# Patient Record
Sex: FEMALE | Race: WHITE | Employment: OTHER | URBAN - NONMETROPOLITAN AREA
[De-identification: names, ages, dates, MRNs, and addresses within clinical notes are randomized per-mention and may not be internally consistent; named-entity substitution may affect disease eponyms.]

---

## 2017-07-19 ENCOUNTER — OFFICE VISIT (OUTPATIENT)
Dept: SURGERY | Age: 67
End: 2017-07-19
Payer: MEDICARE

## 2017-07-19 VITALS
WEIGHT: 188.8 LBS | SYSTOLIC BLOOD PRESSURE: 118 MMHG | HEIGHT: 69 IN | RESPIRATION RATE: 16 BRPM | OXYGEN SATURATION: 97 % | HEART RATE: 68 BPM | TEMPERATURE: 99.1 F | BODY MASS INDEX: 27.96 KG/M2 | DIASTOLIC BLOOD PRESSURE: 64 MMHG

## 2017-07-19 DIAGNOSIS — K80.50 BILIARY COLIC: ICD-10-CM

## 2017-07-19 DIAGNOSIS — R10.9 ABDOMINAL PAIN, UNSPECIFIED LOCATION: Primary | ICD-10-CM

## 2017-07-19 PROCEDURE — 3014F SCREEN MAMMO DOC REV: CPT | Performed by: SURGERY

## 2017-07-19 PROCEDURE — 99203 OFFICE O/P NEW LOW 30 MIN: CPT | Performed by: SURGERY

## 2017-07-19 PROCEDURE — 1090F PRES/ABSN URINE INCON ASSESS: CPT | Performed by: SURGERY

## 2017-07-19 PROCEDURE — 3017F COLORECTAL CA SCREEN DOC REV: CPT | Performed by: SURGERY

## 2017-07-19 PROCEDURE — 1036F TOBACCO NON-USER: CPT | Performed by: SURGERY

## 2017-07-19 PROCEDURE — 1123F ACP DISCUSS/DSCN MKR DOCD: CPT | Performed by: SURGERY

## 2017-07-19 PROCEDURE — G8419 CALC BMI OUT NRM PARAM NOF/U: HCPCS | Performed by: SURGERY

## 2017-07-19 PROCEDURE — G8400 PT W/DXA NO RESULTS DOC: HCPCS | Performed by: SURGERY

## 2017-07-19 PROCEDURE — G8427 DOCREV CUR MEDS BY ELIG CLIN: HCPCS | Performed by: SURGERY

## 2017-07-19 PROCEDURE — 4040F PNEUMOC VAC/ADMIN/RCVD: CPT | Performed by: SURGERY

## 2017-07-19 RX ORDER — IBUPROFEN 800 MG/1
800 TABLET ORAL EVERY 8 HOURS PRN
COMMUNITY
End: 2017-08-18 | Stop reason: ALTCHOICE

## 2017-07-19 RX ORDER — ZOLEDRONIC ACID 5 MG/100ML
5 INJECTION, SOLUTION INTRAVENOUS ONCE
COMMUNITY
End: 2021-04-28 | Stop reason: ALTCHOICE

## 2017-07-19 ASSESSMENT — ENCOUNTER SYMPTOMS
SORE THROAT: 0
SINUS PRESSURE: 0
ABDOMINAL DISTENTION: 0
ABDOMINAL PAIN: 1
COUGH: 0
EYE DISCHARGE: 0
COLOR CHANGE: 0
TROUBLE SWALLOWING: 0
DIARRHEA: 0
EYE PAIN: 0
APNEA: 0
CHOKING: 0
EYE REDNESS: 0
VOMITING: 0
BACK PAIN: 1
CHEST TIGHTNESS: 0
BLOOD IN STOOL: 0
VOICE CHANGE: 0
PHOTOPHOBIA: 0
FACIAL SWELLING: 0
SHORTNESS OF BREATH: 0
EYE ITCHING: 0
ANAL BLEEDING: 0
NAUSEA: 1
WHEEZING: 0
RECTAL PAIN: 0
STRIDOR: 0
RHINORRHEA: 0
CONSTIPATION: 0

## 2017-07-20 ENCOUNTER — TELEPHONE (OUTPATIENT)
Dept: SURGERY | Age: 67
End: 2017-07-20

## 2017-07-24 ENCOUNTER — ANESTHESIA (OUTPATIENT)
Dept: OPERATING ROOM | Age: 67
DRG: 418 | End: 2017-07-24
Payer: MEDICARE

## 2017-07-24 ENCOUNTER — ANESTHESIA EVENT (OUTPATIENT)
Dept: OPERATING ROOM | Age: 67
DRG: 418 | End: 2017-07-24
Payer: MEDICARE

## 2017-07-24 ENCOUNTER — HOSPITAL ENCOUNTER (INPATIENT)
Age: 67
LOS: 6 days | Discharge: ANOTHER HOSPITAL | DRG: 418 | End: 2017-08-01
Attending: SURGERY | Admitting: SURGERY
Payer: MEDICARE

## 2017-07-24 VITALS
SYSTOLIC BLOOD PRESSURE: 132 MMHG | TEMPERATURE: 96.8 F | OXYGEN SATURATION: 81 % | DIASTOLIC BLOOD PRESSURE: 61 MMHG | RESPIRATION RATE: 2 BRPM

## 2017-07-24 DIAGNOSIS — I47.1 NARROW COMPLEX TACHYCARDIA (HCC): Primary | ICD-10-CM

## 2017-07-24 PROCEDURE — 6360000002 HC RX W HCPCS: Performed by: SURGERY

## 2017-07-24 PROCEDURE — 7100000000 HC PACU RECOVERY - FIRST 15 MIN: Performed by: SURGERY

## 2017-07-24 PROCEDURE — G0378 HOSPITAL OBSERVATION PER HR: HCPCS

## 2017-07-24 PROCEDURE — 6370000000 HC RX 637 (ALT 250 FOR IP): Performed by: SURGERY

## 2017-07-24 PROCEDURE — 88304 TISSUE EXAM BY PATHOLOGIST: CPT

## 2017-07-24 PROCEDURE — 3700000000 HC ANESTHESIA ATTENDED CARE: Performed by: SURGERY

## 2017-07-24 PROCEDURE — 0FT44ZZ RESECTION OF GALLBLADDER, PERCUTANEOUS ENDOSCOPIC APPROACH: ICD-10-PCS | Performed by: SURGERY

## 2017-07-24 PROCEDURE — 2500000003 HC RX 250 WO HCPCS: Performed by: NURSE ANESTHETIST, CERTIFIED REGISTERED

## 2017-07-24 PROCEDURE — 2580000003 HC RX 258: Performed by: SURGERY

## 2017-07-24 PROCEDURE — 3600000013 HC SURGERY LEVEL 3 ADDTL 15MIN: Performed by: SURGERY

## 2017-07-24 PROCEDURE — 6360000002 HC RX W HCPCS: Performed by: NURSE ANESTHETIST, CERTIFIED REGISTERED

## 2017-07-24 PROCEDURE — 6360000002 HC RX W HCPCS: Performed by: ANESTHESIOLOGY

## 2017-07-24 PROCEDURE — 2580000003 HC RX 258: Performed by: NURSE ANESTHETIST, CERTIFIED REGISTERED

## 2017-07-24 PROCEDURE — 0WQF4ZZ REPAIR ABDOMINAL WALL, PERCUTANEOUS ENDOSCOPIC APPROACH: ICD-10-PCS | Performed by: SURGERY

## 2017-07-24 PROCEDURE — 3700000001 HC ADD 15 MINUTES (ANESTHESIA): Performed by: SURGERY

## 2017-07-24 PROCEDURE — 2500000003 HC RX 250 WO HCPCS: Performed by: SURGERY

## 2017-07-24 PROCEDURE — 7100000001 HC PACU RECOVERY - ADDTL 15 MIN: Performed by: SURGERY

## 2017-07-24 PROCEDURE — 3600000003 HC SURGERY LEVEL 3 BASE: Performed by: SURGERY

## 2017-07-24 PROCEDURE — 49585 REPAIR UMBILICAL HERN,5+Y/O,REDUC: CPT | Performed by: SURGERY

## 2017-07-24 PROCEDURE — 47562 LAPAROSCOPIC CHOLECYSTECTOMY: CPT | Performed by: SURGERY

## 2017-07-24 RX ORDER — PROPOFOL 10 MG/ML
INJECTION, EMULSION INTRAVENOUS PRN
Status: DISCONTINUED | OUTPATIENT
Start: 2017-07-24 | End: 2017-07-24 | Stop reason: SDUPTHER

## 2017-07-24 RX ORDER — LABETALOL HYDROCHLORIDE 5 MG/ML
5 INJECTION, SOLUTION INTRAVENOUS EVERY 10 MIN PRN
Status: DISCONTINUED | OUTPATIENT
Start: 2017-07-24 | End: 2017-07-24 | Stop reason: HOSPADM

## 2017-07-24 RX ORDER — MEPERIDINE HYDROCHLORIDE 25 MG/ML
12.5 INJECTION INTRAMUSCULAR; INTRAVENOUS; SUBCUTANEOUS EVERY 5 MIN PRN
Status: DISCONTINUED | OUTPATIENT
Start: 2017-07-24 | End: 2017-07-24 | Stop reason: HOSPADM

## 2017-07-24 RX ORDER — ONDANSETRON 2 MG/ML
8 INJECTION INTRAMUSCULAR; INTRAVENOUS EVERY 4 HOURS PRN
Status: DISCONTINUED | OUTPATIENT
Start: 2017-07-24 | End: 2017-07-25

## 2017-07-24 RX ORDER — LIDOCAINE HYDROCHLORIDE 20 MG/ML
INJECTION, SOLUTION INFILTRATION; PERINEURAL PRN
Status: DISCONTINUED | OUTPATIENT
Start: 2017-07-24 | End: 2017-07-24 | Stop reason: SDUPTHER

## 2017-07-24 RX ORDER — OXYCODONE HYDROCHLORIDE AND ACETAMINOPHEN 5; 325 MG/1; MG/1
TABLET ORAL
Status: DISPENSED
Start: 2017-07-24 | End: 2017-07-25

## 2017-07-24 RX ORDER — ROCURONIUM BROMIDE 10 MG/ML
INJECTION, SOLUTION INTRAVENOUS PRN
Status: DISCONTINUED | OUTPATIENT
Start: 2017-07-24 | End: 2017-07-24 | Stop reason: SDUPTHER

## 2017-07-24 RX ORDER — SODIUM CHLORIDE, SODIUM LACTATE, POTASSIUM CHLORIDE, CALCIUM CHLORIDE 600; 310; 30; 20 MG/100ML; MG/100ML; MG/100ML; MG/100ML
INJECTION, SOLUTION INTRAVENOUS CONTINUOUS PRN
Status: DISCONTINUED | OUTPATIENT
Start: 2017-07-24 | End: 2017-07-24 | Stop reason: SDUPTHER

## 2017-07-24 RX ORDER — SODIUM CHLORIDE 9 MG/ML
INJECTION, SOLUTION INTRAVENOUS CONTINUOUS
Status: DISCONTINUED | OUTPATIENT
Start: 2017-07-24 | End: 2017-07-30

## 2017-07-24 RX ORDER — ONDANSETRON 2 MG/ML
4 INJECTION INTRAMUSCULAR; INTRAVENOUS EVERY 6 HOURS PRN
Status: DISCONTINUED | OUTPATIENT
Start: 2017-07-24 | End: 2017-07-25

## 2017-07-24 RX ORDER — FENTANYL CITRATE 50 UG/ML
50 INJECTION, SOLUTION INTRAMUSCULAR; INTRAVENOUS EVERY 5 MIN PRN
Status: DISCONTINUED | OUTPATIENT
Start: 2017-07-24 | End: 2017-07-24 | Stop reason: HOSPADM

## 2017-07-24 RX ORDER — MIDAZOLAM HYDROCHLORIDE 1 MG/ML
INJECTION INTRAMUSCULAR; INTRAVENOUS PRN
Status: DISCONTINUED | OUTPATIENT
Start: 2017-07-24 | End: 2017-07-24 | Stop reason: SDUPTHER

## 2017-07-24 RX ORDER — KETOROLAC TROMETHAMINE 30 MG/ML
INJECTION, SOLUTION INTRAMUSCULAR; INTRAVENOUS PRN
Status: DISCONTINUED | OUTPATIENT
Start: 2017-07-24 | End: 2017-07-24 | Stop reason: SDUPTHER

## 2017-07-24 RX ORDER — FENTANYL CITRATE 50 UG/ML
INJECTION, SOLUTION INTRAMUSCULAR; INTRAVENOUS PRN
Status: DISCONTINUED | OUTPATIENT
Start: 2017-07-24 | End: 2017-07-24 | Stop reason: SDUPTHER

## 2017-07-24 RX ORDER — ONDANSETRON 2 MG/ML
4 INJECTION INTRAMUSCULAR; INTRAVENOUS
Status: COMPLETED | OUTPATIENT
Start: 2017-07-24 | End: 2017-07-24

## 2017-07-24 RX ORDER — OXYCODONE HYDROCHLORIDE AND ACETAMINOPHEN 5; 325 MG/1; MG/1
2 TABLET ORAL EVERY 4 HOURS PRN
Status: DISCONTINUED | OUTPATIENT
Start: 2017-07-24 | End: 2017-08-01 | Stop reason: HOSPADM

## 2017-07-24 RX ORDER — ONDANSETRON 2 MG/ML
INJECTION INTRAMUSCULAR; INTRAVENOUS PRN
Status: DISCONTINUED | OUTPATIENT
Start: 2017-07-24 | End: 2017-07-24 | Stop reason: SDUPTHER

## 2017-07-24 RX ORDER — NEOSTIGMINE METHYLSULFATE 1 MG/ML
INJECTION, SOLUTION INTRAVENOUS PRN
Status: DISCONTINUED | OUTPATIENT
Start: 2017-07-24 | End: 2017-07-24 | Stop reason: SDUPTHER

## 2017-07-24 RX ORDER — OXYCODONE HYDROCHLORIDE AND ACETAMINOPHEN 5; 325 MG/1; MG/1
1 TABLET ORAL EVERY 4 HOURS PRN
Status: DISCONTINUED | OUTPATIENT
Start: 2017-07-24 | End: 2017-08-01 | Stop reason: HOSPADM

## 2017-07-24 RX ORDER — BUPIVACAINE HYDROCHLORIDE AND EPINEPHRINE 5; 5 MG/ML; UG/ML
INJECTION, SOLUTION EPIDURAL; INTRACAUDAL; PERINEURAL PRN
Status: DISCONTINUED | OUTPATIENT
Start: 2017-07-24 | End: 2017-07-24 | Stop reason: HOSPADM

## 2017-07-24 RX ORDER — GLYCOPYRROLATE 0.2 MG/ML
INJECTION INTRAMUSCULAR; INTRAVENOUS PRN
Status: DISCONTINUED | OUTPATIENT
Start: 2017-07-24 | End: 2017-07-24 | Stop reason: SDUPTHER

## 2017-07-24 RX ORDER — OXYCODONE HYDROCHLORIDE AND ACETAMINOPHEN 5; 325 MG/1; MG/1
1 TABLET ORAL EVERY 6 HOURS PRN
Qty: 24 TABLET | Refills: 0 | Status: ON HOLD | OUTPATIENT
Start: 2017-07-24 | End: 2017-09-26 | Stop reason: ALTCHOICE

## 2017-07-24 RX ADMIN — ONDANSETRON 4 MG: 2 INJECTION INTRAMUSCULAR; INTRAVENOUS at 17:24

## 2017-07-24 RX ADMIN — GLYCOPYRROLATE 0.6 MG: 0.2 INJECTION, SOLUTION INTRAMUSCULAR; INTRAVENOUS at 13:20

## 2017-07-24 RX ADMIN — LIDOCAINE HYDROCHLORIDE 80 MG: 20 INJECTION, SOLUTION INFILTRATION; PERINEURAL at 12:20

## 2017-07-24 RX ADMIN — FENTANYL CITRATE 50 MCG: 50 INJECTION INTRAMUSCULAR; INTRAVENOUS at 12:50

## 2017-07-24 RX ADMIN — SODIUM CHLORIDE: 9 INJECTION, SOLUTION INTRAVENOUS at 17:32

## 2017-07-24 RX ADMIN — SODIUM CHLORIDE: 9 INJECTION, SOLUTION INTRAVENOUS at 10:38

## 2017-07-24 RX ADMIN — OXYCODONE HYDROCHLORIDE AND ACETAMINOPHEN 1 TABLET: 5; 325 TABLET ORAL at 15:08

## 2017-07-24 RX ADMIN — KETOROLAC TROMETHAMINE 30 MG: 30 INJECTION, SOLUTION INTRAMUSCULAR at 13:12

## 2017-07-24 RX ADMIN — ONDANSETRON 4 MG: 2 INJECTION INTRAMUSCULAR; INTRAVENOUS at 13:12

## 2017-07-24 RX ADMIN — Medication 35 MG: at 12:21

## 2017-07-24 RX ADMIN — MIDAZOLAM HYDROCHLORIDE 2 MG: 1 INJECTION INTRAMUSCULAR; INTRAVENOUS at 12:12

## 2017-07-24 RX ADMIN — NEOSTIGMINE METHYLSULFATE 3 MG: 1 INJECTION, SOLUTION INTRAVENOUS at 13:20

## 2017-07-24 RX ADMIN — PROPOFOL 200 MG: 10 INJECTION, EMULSION INTRAVENOUS at 12:20

## 2017-07-24 RX ADMIN — FENTANYL CITRATE 100 MCG: 50 INJECTION INTRAMUSCULAR; INTRAVENOUS at 12:20

## 2017-07-24 RX ADMIN — OXYCODONE HYDROCHLORIDE AND ACETAMINOPHEN 1 TABLET: 5; 325 TABLET ORAL at 23:58

## 2017-07-24 RX ADMIN — HYDROMORPHONE HYDROCHLORIDE 0.5 MG: 1 INJECTION, SOLUTION INTRAMUSCULAR; INTRAVENOUS; SUBCUTANEOUS at 16:12

## 2017-07-24 RX ADMIN — SODIUM CHLORIDE, POTASSIUM CHLORIDE, SODIUM LACTATE AND CALCIUM CHLORIDE: 600; 310; 30; 20 INJECTION, SOLUTION INTRAVENOUS at 13:23

## 2017-07-24 RX ADMIN — FENTANYL CITRATE 100 MCG: 50 INJECTION INTRAMUSCULAR; INTRAVENOUS at 13:31

## 2017-07-24 ASSESSMENT — PULMONARY FUNCTION TESTS
PIF_VALUE: 27
PIF_VALUE: 15
PIF_VALUE: 27
PIF_VALUE: 15
PIF_VALUE: 2
PIF_VALUE: 27
PIF_VALUE: 0
PIF_VALUE: 27
PIF_VALUE: 27
PIF_VALUE: 8
PIF_VALUE: 15
PIF_VALUE: 27
PIF_VALUE: 1
PIF_VALUE: 15
PIF_VALUE: 27
PIF_VALUE: 15
PIF_VALUE: 1
PIF_VALUE: 20
PIF_VALUE: 15
PIF_VALUE: 27
PIF_VALUE: 16
PIF_VALUE: 16
PIF_VALUE: 14
PIF_VALUE: 27
PIF_VALUE: 15
PIF_VALUE: 0
PIF_VALUE: 15
PIF_VALUE: 15
PIF_VALUE: 0
PIF_VALUE: 0
PIF_VALUE: 15
PIF_VALUE: 15
PIF_VALUE: 13
PIF_VALUE: 2
PIF_VALUE: 27
PIF_VALUE: 27
PIF_VALUE: 0
PIF_VALUE: 27
PIF_VALUE: 15
PIF_VALUE: 27
PIF_VALUE: 19
PIF_VALUE: 15
PIF_VALUE: 15
PIF_VALUE: 0
PIF_VALUE: 15
PIF_VALUE: 0
PIF_VALUE: 1
PIF_VALUE: 27
PIF_VALUE: 14
PIF_VALUE: 1
PIF_VALUE: 14
PIF_VALUE: 14
PIF_VALUE: 15
PIF_VALUE: 15
PIF_VALUE: 0
PIF_VALUE: 15
PIF_VALUE: 27
PIF_VALUE: 2
PIF_VALUE: 27
PIF_VALUE: 0
PIF_VALUE: 15
PIF_VALUE: 8
PIF_VALUE: 15
PIF_VALUE: 15
PIF_VALUE: 14
PIF_VALUE: 0
PIF_VALUE: 15
PIF_VALUE: 27
PIF_VALUE: 0
PIF_VALUE: 27
PIF_VALUE: 27
PIF_VALUE: 16
PIF_VALUE: 15
PIF_VALUE: 15
PIF_VALUE: 16
PIF_VALUE: 27
PIF_VALUE: 13
PIF_VALUE: 15
PIF_VALUE: 15
PIF_VALUE: 14

## 2017-07-24 ASSESSMENT — PAIN DESCRIPTION - PAIN TYPE
TYPE: SURGICAL PAIN
TYPE: SURGICAL PAIN

## 2017-07-24 ASSESSMENT — PAIN SCALES - GENERAL
PAINLEVEL_OUTOF10: 7
PAINLEVEL_OUTOF10: 3
PAINLEVEL_OUTOF10: 8
PAINLEVEL_OUTOF10: 0
PAINLEVEL_OUTOF10: 3
PAINLEVEL_OUTOF10: 6
PAINLEVEL_OUTOF10: 8
PAINLEVEL_OUTOF10: 7
PAINLEVEL_OUTOF10: 0

## 2017-07-24 ASSESSMENT — PAIN DESCRIPTION - LOCATION: LOCATION: ABDOMEN

## 2017-07-24 ASSESSMENT — PAIN - FUNCTIONAL ASSESSMENT: PAIN_FUNCTIONAL_ASSESSMENT: 0-10

## 2017-07-25 ENCOUNTER — APPOINTMENT (OUTPATIENT)
Dept: GENERAL RADIOLOGY | Age: 67
DRG: 418 | End: 2017-07-25
Attending: SURGERY
Payer: MEDICARE

## 2017-07-25 ENCOUNTER — APPOINTMENT (OUTPATIENT)
Dept: CT IMAGING | Age: 67
DRG: 418 | End: 2017-07-25
Attending: SURGERY
Payer: MEDICARE

## 2017-07-25 PROBLEM — I47.19 NARROW COMPLEX TACHYCARDIA: Status: ACTIVE | Noted: 2017-07-25

## 2017-07-25 PROBLEM — I71.21 ASCENDING AORTIC ANEURYSM (HCC): Status: ACTIVE | Noted: 2017-07-25

## 2017-07-25 PROBLEM — Z90.49 S/P LAPAROSCOPIC CHOLECYSTECTOMY: Status: ACTIVE | Noted: 2017-07-25

## 2017-07-25 PROBLEM — I47.1 NARROW COMPLEX TACHYCARDIA: Status: ACTIVE | Noted: 2017-07-25

## 2017-07-25 LAB
ALBUMIN SERPL-MCNC: 3.4 G/DL (ref 3.5–5.1)
ALBUMIN SERPL-MCNC: 3.4 G/DL (ref 3.5–5.1)
ALLEN TEST: ABNORMAL
ALP BLD-CCNC: 55 U/L (ref 38–126)
ALP BLD-CCNC: 57 U/L (ref 38–126)
ALT SERPL-CCNC: 30 U/L (ref 11–66)
ALT SERPL-CCNC: 33 U/L (ref 11–66)
ANION GAP SERPL CALCULATED.3IONS-SCNC: 11 MEQ/L (ref 8–16)
ANION GAP SERPL CALCULATED.3IONS-SCNC: 16 MEQ/L (ref 8–16)
AST SERPL-CCNC: 44 U/L (ref 5–40)
AST SERPL-CCNC: 49 U/L (ref 5–40)
BASE EXCESS (CALCULATED): -2.2 MMOL/L (ref -2.5–2.5)
BILIRUB SERPL-MCNC: 1.3 MG/DL (ref 0.3–1.2)
BILIRUB SERPL-MCNC: 2.3 MG/DL (ref 0.3–1.2)
BILIRUBIN DIRECT: 0.5 MG/DL (ref 0–0.3)
BUN BLDV-MCNC: 13 MG/DL (ref 7–22)
BUN BLDV-MCNC: 17 MG/DL (ref 7–22)
CALCIUM IONIZED: 0.99 MMOL/L (ref 1.12–1.32)
CALCIUM SERPL-MCNC: 7.8 MG/DL (ref 8.5–10.5)
CALCIUM SERPL-MCNC: 7.9 MG/DL (ref 8.5–10.5)
CHLORIDE BLD-SCNC: 104 MEQ/L (ref 98–111)
CHLORIDE BLD-SCNC: 104 MEQ/L (ref 98–111)
CO2: 18 MEQ/L (ref 23–33)
CO2: 24 MEQ/L (ref 23–33)
COLLECTED BY:: ABNORMAL
CREAT SERPL-MCNC: 0.7 MG/DL (ref 0.4–1.2)
CREAT SERPL-MCNC: 0.7 MG/DL (ref 0.4–1.2)
D-DIMER QUANTITATIVE: 4888 NG/ML FEU (ref 0–500)
DEVICE: ABNORMAL
GFR SERPL CREATININE-BSD FRML MDRD: 83 ML/MIN/1.73M2
GFR SERPL CREATININE-BSD FRML MDRD: 83 ML/MIN/1.73M2
GLUCOSE BLD-MCNC: 105 MG/DL (ref 70–108)
GLUCOSE BLD-MCNC: 130 MG/DL (ref 70–108)
HCO3: 22 MMOL/L (ref 23–28)
HCT VFR BLD CALC: 38 % (ref 37–47)
HCT VFR BLD CALC: 38.6 % (ref 37–47)
HEMOGLOBIN: 12.9 GM/DL (ref 12–16)
HEMOGLOBIN: 13 GM/DL (ref 12–16)
IFIO2: 2
LV EF: 63 %
LVEF MODALITY: NORMAL
MAGNESIUM: 1.7 MG/DL (ref 1.6–2.4)
MAGNESIUM: 1.7 MG/DL (ref 1.6–2.4)
MCH RBC QN AUTO: 33 PG (ref 27–31)
MCH RBC QN AUTO: 33.3 PG (ref 27–31)
MCHC RBC AUTO-ENTMCNC: 33.7 GM/DL (ref 33–37)
MCHC RBC AUTO-ENTMCNC: 33.8 GM/DL (ref 33–37)
MCV RBC AUTO: 97.9 FL (ref 81–99)
MCV RBC AUTO: 98.4 FL (ref 81–99)
O2 SATURATION: 95 %
PCO2: 35 MMHG (ref 35–45)
PDW BLD-RTO: 13.2 % (ref 11.5–14.5)
PDW BLD-RTO: 13.4 % (ref 11.5–14.5)
PH BLOOD GAS: 7.41 (ref 7.35–7.45)
PHOSPHORUS: 2.3 MG/DL (ref 2.4–4.7)
PHOSPHORUS: 2.9 MG/DL (ref 2.4–4.7)
PLATELET # BLD: 125 THOU/MM3 (ref 130–400)
PLATELET # BLD: 135 THOU/MM3 (ref 130–400)
PMV BLD AUTO: 8.3 MCM (ref 7.4–10.4)
PMV BLD AUTO: 8.5 MCM (ref 7.4–10.4)
PO2: 72 MMHG (ref 71–104)
POTASSIUM SERPL-SCNC: 3.5 MEQ/L (ref 3.5–5.2)
POTASSIUM SERPL-SCNC: 4.2 MEQ/L (ref 3.5–5.2)
RBC # BLD: 3.86 MILL/MM3 (ref 4.2–5.4)
RBC # BLD: 3.95 MILL/MM3 (ref 4.2–5.4)
SODIUM BLD-SCNC: 138 MEQ/L (ref 135–145)
SODIUM BLD-SCNC: 139 MEQ/L (ref 135–145)
SOURCE, BLOOD GAS: ABNORMAL
TOTAL PROTEIN: 6 G/DL (ref 6.1–8)
TOTAL PROTEIN: 6.1 G/DL (ref 6.1–8)
TROPONIN T: < 0.01 NG/ML
TSH SERPL DL<=0.05 MIU/L-ACNC: 3.9 UIU/ML (ref 0.4–4.2)
WBC # BLD: 8.1 THOU/MM3 (ref 4.8–10.8)
WBC # BLD: 8.8 THOU/MM3 (ref 4.8–10.8)

## 2017-07-25 PROCEDURE — 99024 POSTOP FOLLOW-UP VISIT: CPT | Performed by: NURSE PRACTITIONER

## 2017-07-25 PROCEDURE — 2500000003 HC RX 250 WO HCPCS

## 2017-07-25 PROCEDURE — 83735 ASSAY OF MAGNESIUM: CPT

## 2017-07-25 PROCEDURE — 82803 BLOOD GASES ANY COMBINATION: CPT

## 2017-07-25 PROCEDURE — G0378 HOSPITAL OBSERVATION PER HR: HCPCS

## 2017-07-25 PROCEDURE — 93306 TTE W/DOPPLER COMPLETE: CPT

## 2017-07-25 PROCEDURE — 84484 ASSAY OF TROPONIN QUANT: CPT

## 2017-07-25 PROCEDURE — 6370000000 HC RX 637 (ALT 250 FOR IP): Performed by: SURGERY

## 2017-07-25 PROCEDURE — 2580000003 HC RX 258: Performed by: SURGERY

## 2017-07-25 PROCEDURE — 93005 ELECTROCARDIOGRAM TRACING: CPT

## 2017-07-25 PROCEDURE — 84100 ASSAY OF PHOSPHORUS: CPT

## 2017-07-25 PROCEDURE — 6360000004 HC RX CONTRAST MEDICATION: Performed by: INTERNAL MEDICINE

## 2017-07-25 PROCEDURE — 36600 WITHDRAWAL OF ARTERIAL BLOOD: CPT

## 2017-07-25 PROCEDURE — 71010 XR CHEST PORTABLE: CPT

## 2017-07-25 PROCEDURE — 6360000002 HC RX W HCPCS: Performed by: INTERNAL MEDICINE

## 2017-07-25 PROCEDURE — 71260 CT THORAX DX C+: CPT

## 2017-07-25 PROCEDURE — 36415 COLL VENOUS BLD VENIPUNCTURE: CPT

## 2017-07-25 PROCEDURE — 85379 FIBRIN DEGRADATION QUANT: CPT

## 2017-07-25 PROCEDURE — 99222 1ST HOSP IP/OBS MODERATE 55: CPT | Performed by: INTERNAL MEDICINE

## 2017-07-25 PROCEDURE — 6370000000 HC RX 637 (ALT 250 FOR IP): Performed by: INTERNAL MEDICINE

## 2017-07-25 PROCEDURE — 6360000002 HC RX W HCPCS: Performed by: SURGERY

## 2017-07-25 PROCEDURE — 6370000000 HC RX 637 (ALT 250 FOR IP): Performed by: NURSE PRACTITIONER

## 2017-07-25 PROCEDURE — 80048 BASIC METABOLIC PNL TOTAL CA: CPT

## 2017-07-25 PROCEDURE — 2580000003 HC RX 258: Performed by: INTERNAL MEDICINE

## 2017-07-25 PROCEDURE — 80076 HEPATIC FUNCTION PANEL: CPT

## 2017-07-25 PROCEDURE — 80053 COMPREHEN METABOLIC PANEL: CPT

## 2017-07-25 PROCEDURE — 84443 ASSAY THYROID STIM HORMONE: CPT

## 2017-07-25 PROCEDURE — 99223 1ST HOSP IP/OBS HIGH 75: CPT | Performed by: INTERNAL MEDICINE

## 2017-07-25 PROCEDURE — 85027 COMPLETE CBC AUTOMATED: CPT

## 2017-07-25 PROCEDURE — 82330 ASSAY OF CALCIUM: CPT

## 2017-07-25 RX ORDER — POTASSIUM CHLORIDE 20 MEQ/1
40 TABLET, EXTENDED RELEASE ORAL PRN
Status: DISCONTINUED | OUTPATIENT
Start: 2017-07-25 | End: 2017-08-01 | Stop reason: HOSPADM

## 2017-07-25 RX ORDER — POTASSIUM CHLORIDE 20 MEQ/1
40 TABLET, EXTENDED RELEASE ORAL ONCE
Status: COMPLETED | OUTPATIENT
Start: 2017-07-25 | End: 2017-07-25

## 2017-07-25 RX ORDER — DIAZEPAM 5 MG/1
5 TABLET ORAL EVERY 6 HOURS PRN
Status: DISCONTINUED | OUTPATIENT
Start: 2017-07-25 | End: 2017-08-01 | Stop reason: HOSPADM

## 2017-07-25 RX ORDER — MORPHINE SULFATE 2 MG/ML
INJECTION, SOLUTION INTRAMUSCULAR; INTRAVENOUS
Status: DISPENSED
Start: 2017-07-25 | End: 2017-07-25

## 2017-07-25 RX ORDER — DOCUSATE SODIUM 100 MG/1
100 CAPSULE, LIQUID FILLED ORAL DAILY
Status: DISCONTINUED | OUTPATIENT
Start: 2017-07-25 | End: 2017-08-01 | Stop reason: HOSPADM

## 2017-07-25 RX ORDER — POTASSIUM CHLORIDE 7.45 MG/ML
10 INJECTION INTRAVENOUS PRN
Status: DISCONTINUED | OUTPATIENT
Start: 2017-07-25 | End: 2017-08-01 | Stop reason: HOSPADM

## 2017-07-25 RX ORDER — POTASSIUM CHLORIDE 20MEQ/15ML
40 LIQUID (ML) ORAL PRN
Status: DISCONTINUED | OUTPATIENT
Start: 2017-07-25 | End: 2017-08-01 | Stop reason: HOSPADM

## 2017-07-25 RX ORDER — SIMETHICONE 80 MG
80 TABLET,CHEWABLE ORAL EVERY 6 HOURS PRN
Status: DISCONTINUED | OUTPATIENT
Start: 2017-07-25 | End: 2017-08-01 | Stop reason: HOSPADM

## 2017-07-25 RX ORDER — ONDANSETRON 2 MG/ML
4 INJECTION INTRAMUSCULAR; INTRAVENOUS EVERY 6 HOURS PRN
Status: DISCONTINUED | OUTPATIENT
Start: 2017-07-25 | End: 2017-08-01 | Stop reason: HOSPADM

## 2017-07-25 RX ORDER — ONDANSETRON 2 MG/ML
8 INJECTION INTRAMUSCULAR; INTRAVENOUS EVERY 6 HOURS PRN
Status: DISCONTINUED | OUTPATIENT
Start: 2017-07-25 | End: 2017-08-01 | Stop reason: HOSPADM

## 2017-07-25 RX ADMIN — OXYCODONE HYDROCHLORIDE AND ACETAMINOPHEN 2 TABLET: 5; 325 TABLET ORAL at 19:51

## 2017-07-25 RX ADMIN — SODIUM CHLORIDE: 9 INJECTION, SOLUTION INTRAVENOUS at 08:37

## 2017-07-25 RX ADMIN — DOCUSATE SODIUM 100 MG: 100 CAPSULE ORAL at 17:26

## 2017-07-25 RX ADMIN — POTASSIUM CHLORIDE 40 MEQ: 1500 TABLET, EXTENDED RELEASE ORAL at 08:33

## 2017-07-25 RX ADMIN — SODIUM CHLORIDE: 9 INJECTION, SOLUTION INTRAVENOUS at 18:39

## 2017-07-25 RX ADMIN — OXYCODONE HYDROCHLORIDE AND ACETAMINOPHEN 2 TABLET: 5; 325 TABLET ORAL at 23:53

## 2017-07-25 RX ADMIN — CALCIUM GLUCONATE 1.5 G: 94 INJECTION, SOLUTION INTRAVENOUS at 08:33

## 2017-07-25 RX ADMIN — MAGNESIUM SULFATE HEPTAHYDRATE 2 G: 500 INJECTION, SOLUTION INTRAMUSCULAR; INTRAVENOUS at 19:16

## 2017-07-25 RX ADMIN — SIMETHICONE 80 MG: 80 TABLET, CHEWABLE ORAL at 14:23

## 2017-07-25 RX ADMIN — METOPROLOL TARTRATE 25 MG: 25 TABLET ORAL at 17:00

## 2017-07-25 RX ADMIN — OXYCODONE HYDROCHLORIDE AND ACETAMINOPHEN 1 TABLET: 5; 325 TABLET ORAL at 13:03

## 2017-07-25 RX ADMIN — OXYCODONE HYDROCHLORIDE AND ACETAMINOPHEN 2 TABLET: 5; 325 TABLET ORAL at 04:55

## 2017-07-25 RX ADMIN — IOPAMIDOL 80 ML: 755 INJECTION, SOLUTION INTRAVENOUS at 20:58

## 2017-07-25 RX ADMIN — SODIUM CHLORIDE: 9 INJECTION, SOLUTION INTRAVENOUS at 00:02

## 2017-07-25 ASSESSMENT — PAIN DESCRIPTION - DESCRIPTORS
DESCRIPTORS: SHARP

## 2017-07-25 ASSESSMENT — PAIN DESCRIPTION - LOCATION
LOCATION: ABDOMEN

## 2017-07-25 ASSESSMENT — PAIN DESCRIPTION - PAIN TYPE
TYPE: SURGICAL PAIN

## 2017-07-25 ASSESSMENT — PAIN SCALES - GENERAL
PAINLEVEL_OUTOF10: 4
PAINLEVEL_OUTOF10: 8
PAINLEVEL_OUTOF10: 6
PAINLEVEL_OUTOF10: 5
PAINLEVEL_OUTOF10: 3
PAINLEVEL_OUTOF10: 6
PAINLEVEL_OUTOF10: 5
PAINLEVEL_OUTOF10: 3

## 2017-07-25 ASSESSMENT — PAIN DESCRIPTION - ORIENTATION
ORIENTATION: UPPER

## 2017-07-26 ENCOUNTER — APPOINTMENT (OUTPATIENT)
Dept: GENERAL RADIOLOGY | Age: 67
DRG: 418 | End: 2017-07-26
Attending: SURGERY
Payer: MEDICARE

## 2017-07-26 PROBLEM — R94.31 ABNORMAL EKG: Status: ACTIVE | Noted: 2017-07-26

## 2017-07-26 LAB
ALBUMIN SERPL-MCNC: 2.9 G/DL (ref 3.5–5.1)
ALP BLD-CCNC: 52 U/L (ref 38–126)
ALT SERPL-CCNC: 31 U/L (ref 11–66)
ANION GAP SERPL CALCULATED.3IONS-SCNC: 12 MEQ/L (ref 8–16)
AST SERPL-CCNC: 48 U/L (ref 5–40)
BASOPHILS # BLD: 0.2 %
BASOPHILS ABSOLUTE: 0 THOU/MM3 (ref 0–0.1)
BILIRUB SERPL-MCNC: 2.9 MG/DL (ref 0.3–1.2)
BUN BLDV-MCNC: 11 MG/DL (ref 7–22)
CALCIUM SERPL-MCNC: 7.7 MG/DL (ref 8.5–10.5)
CHLORIDE BLD-SCNC: 105 MEQ/L (ref 98–111)
CO2: 22 MEQ/L (ref 23–33)
CREAT SERPL-MCNC: 0.6 MG/DL (ref 0.4–1.2)
EKG ATRIAL RATE: 69 BPM
EKG ATRIAL RATE: 71 BPM
EKG ATRIAL RATE: 86 BPM
EKG P AXIS: 47 DEGREES
EKG P AXIS: 56 DEGREES
EKG P-R INTERVAL: 124 MS
EKG P-R INTERVAL: 124 MS
EKG Q-T INTERVAL: 242 MS
EKG Q-T INTERVAL: 390 MS
EKG Q-T INTERVAL: 392 MS
EKG QRS DURATION: 80 MS
EKG QRS DURATION: 82 MS
EKG QRS DURATION: 86 MS
EKG QTC CALCULATION (BAZETT): 410 MS
EKG QTC CALCULATION (BAZETT): 417 MS
EKG QTC CALCULATION (BAZETT): 425 MS
EKG R AXIS: 13 DEGREES
EKG R AXIS: 43 DEGREES
EKG R AXIS: 8 DEGREES
EKG T AXIS: -132 DEGREES
EKG T AXIS: 14 DEGREES
EKG T AXIS: 3 DEGREES
EKG VENTRICULAR RATE: 173 BPM
EKG VENTRICULAR RATE: 69 BPM
EKG VENTRICULAR RATE: 71 BPM
EOSINOPHIL # BLD: 0.6 %
EOSINOPHILS ABSOLUTE: 0.1 THOU/MM3 (ref 0–0.4)
GFR SERPL CREATININE-BSD FRML MDRD: > 90 ML/MIN/1.73M2
GLUCOSE BLD-MCNC: 125 MG/DL (ref 70–108)
HCT VFR BLD CALC: 37.6 % (ref 37–47)
HEMOGLOBIN: 12.6 GM/DL (ref 12–16)
LYMPHOCYTES # BLD: 8.3 %
LYMPHOCYTES ABSOLUTE: 0.7 THOU/MM3 (ref 1–4.8)
MACROCYTES: ABNORMAL
MAGNESIUM: 2.1 MG/DL (ref 1.6–2.4)
MCH RBC QN AUTO: 33.3 PG (ref 27–31)
MCHC RBC AUTO-ENTMCNC: 33.5 GM/DL (ref 33–37)
MCV RBC AUTO: 99.4 FL (ref 81–99)
MONOCYTES # BLD: 7.6 %
MONOCYTES ABSOLUTE: 0.7 THOU/MM3 (ref 0.4–1.3)
NUCLEATED RED BLOOD CELLS: 0 /100 WBC
PDW BLD-RTO: 13.4 % (ref 11.5–14.5)
PHOSPHORUS: 2.5 MG/DL (ref 2.4–4.7)
PLATELET # BLD: 110 THOU/MM3 (ref 130–400)
PMV BLD AUTO: 8.3 MCM (ref 7.4–10.4)
POTASSIUM SERPL-SCNC: 4.5 MEQ/L (ref 3.5–5.2)
RBC # BLD: 3.79 MILL/MM3 (ref 4.2–5.4)
RBC # BLD: NORMAL 10*6/UL
SEG NEUTROPHILS: 83.3 %
SEGMENTED NEUTROPHILS ABSOLUTE COUNT: 7.5 THOU/MM3 (ref 1.8–7.7)
SODIUM BLD-SCNC: 139 MEQ/L (ref 135–145)
TOTAL PROTEIN: 5.4 G/DL (ref 6.1–8)
WBC # BLD: 9 THOU/MM3 (ref 4.8–10.8)

## 2017-07-26 PROCEDURE — 85025 COMPLETE CBC W/AUTO DIFF WBC: CPT

## 2017-07-26 PROCEDURE — 99232 SBSQ HOSP IP/OBS MODERATE 35: CPT | Performed by: NURSE PRACTITIONER

## 2017-07-26 PROCEDURE — 6370000000 HC RX 637 (ALT 250 FOR IP): Performed by: NURSE PRACTITIONER

## 2017-07-26 PROCEDURE — 6360000002 HC RX W HCPCS: Performed by: SURGERY

## 2017-07-26 PROCEDURE — 6370000000 HC RX 637 (ALT 250 FOR IP): Performed by: INTERNAL MEDICINE

## 2017-07-26 PROCEDURE — 6370000000 HC RX 637 (ALT 250 FOR IP): Performed by: SURGERY

## 2017-07-26 PROCEDURE — 99024 POSTOP FOLLOW-UP VISIT: CPT | Performed by: SURGERY

## 2017-07-26 PROCEDURE — 2140000000 HC CCU INTERMEDIATE R&B

## 2017-07-26 PROCEDURE — 74000 XR ABDOMEN LIMITED (KUB): CPT

## 2017-07-26 PROCEDURE — 83735 ASSAY OF MAGNESIUM: CPT

## 2017-07-26 PROCEDURE — 99222 1ST HOSP IP/OBS MODERATE 55: CPT | Performed by: INTERNAL MEDICINE

## 2017-07-26 PROCEDURE — 84100 ASSAY OF PHOSPHORUS: CPT

## 2017-07-26 PROCEDURE — 80053 COMPREHEN METABOLIC PANEL: CPT

## 2017-07-26 PROCEDURE — 36415 COLL VENOUS BLD VENIPUNCTURE: CPT

## 2017-07-26 PROCEDURE — 2580000003 HC RX 258: Performed by: SURGERY

## 2017-07-26 PROCEDURE — 99024 POSTOP FOLLOW-UP VISIT: CPT | Performed by: NURSE PRACTITIONER

## 2017-07-26 RX ORDER — ASPIRIN 81 MG/1
81 TABLET ORAL DAILY
Status: DISCONTINUED | OUTPATIENT
Start: 2017-07-26 | End: 2017-07-27

## 2017-07-26 RX ORDER — CHLORDIAZEPOXIDE HYDROCHLORIDE AND CLIDINIUM BROMIDE 5; 2.5 MG/1; MG/1
1 CAPSULE ORAL 2 TIMES DAILY PRN
Status: DISCONTINUED | OUTPATIENT
Start: 2017-07-26 | End: 2017-08-01 | Stop reason: HOSPADM

## 2017-07-26 RX ADMIN — SODIUM CHLORIDE: 9 INJECTION, SOLUTION INTRAVENOUS at 12:54

## 2017-07-26 RX ADMIN — SODIUM CHLORIDE: 9 INJECTION, SOLUTION INTRAVENOUS at 21:08

## 2017-07-26 RX ADMIN — METOPROLOL TARTRATE 25 MG: 25 TABLET ORAL at 21:08

## 2017-07-26 RX ADMIN — SIMETHICONE 80 MG: 80 TABLET, CHEWABLE ORAL at 15:49

## 2017-07-26 RX ADMIN — OXYCODONE HYDROCHLORIDE AND ACETAMINOPHEN 1 TABLET: 5; 325 TABLET ORAL at 22:11

## 2017-07-26 RX ADMIN — CHLORDIAZEPOXIDE HYDROCHLORIDE AND CLIDINIUM BROMIDE 1 CAPSULE: 5; 2.5 CAPSULE ORAL at 17:02

## 2017-07-26 RX ADMIN — OXYCODONE HYDROCHLORIDE AND ACETAMINOPHEN 1 TABLET: 5; 325 TABLET ORAL at 08:38

## 2017-07-26 RX ADMIN — POTASSIUM & SODIUM PHOSPHATES POWDER PACK 280-160-250 MG 500 MG: 280-160-250 PACK at 01:38

## 2017-07-26 RX ADMIN — ASPIRIN 81 MG: 81 TABLET, COATED ORAL at 11:48

## 2017-07-26 RX ADMIN — OXYCODONE HYDROCHLORIDE AND ACETAMINOPHEN 1 TABLET: 5; 325 TABLET ORAL at 18:03

## 2017-07-26 RX ADMIN — METOPROLOL TARTRATE 25 MG: 25 TABLET ORAL at 08:38

## 2017-07-26 RX ADMIN — OXYCODONE HYDROCHLORIDE AND ACETAMINOPHEN 1 TABLET: 5; 325 TABLET ORAL at 12:57

## 2017-07-26 RX ADMIN — OXYCODONE HYDROCHLORIDE AND ACETAMINOPHEN 1 TABLET: 5; 325 TABLET ORAL at 04:27

## 2017-07-26 RX ADMIN — DOCUSATE SODIUM 100 MG: 100 CAPSULE ORAL at 08:38

## 2017-07-26 RX ADMIN — ONDANSETRON 4 MG: 2 INJECTION INTRAMUSCULAR; INTRAVENOUS at 11:06

## 2017-07-26 RX ADMIN — SODIUM CHLORIDE: 9 INJECTION, SOLUTION INTRAVENOUS at 04:35

## 2017-07-26 ASSESSMENT — PAIN DESCRIPTION - LOCATION
LOCATION: ABDOMEN
LOCATION: ABDOMEN

## 2017-07-26 ASSESSMENT — PAIN SCALES - GENERAL
PAINLEVEL_OUTOF10: 4
PAINLEVEL_OUTOF10: 4
PAINLEVEL_OUTOF10: 3
PAINLEVEL_OUTOF10: 4
PAINLEVEL_OUTOF10: 4
PAINLEVEL_OUTOF10: 2

## 2017-07-26 ASSESSMENT — PAIN DESCRIPTION - ORIENTATION: ORIENTATION: UPPER

## 2017-07-26 ASSESSMENT — PAIN DESCRIPTION - PAIN TYPE
TYPE: SURGICAL PAIN
TYPE: SURGICAL PAIN

## 2017-07-26 ASSESSMENT — PAIN DESCRIPTION - DESCRIPTORS: DESCRIPTORS: SHARP

## 2017-07-27 ENCOUNTER — APPOINTMENT (OUTPATIENT)
Dept: GENERAL RADIOLOGY | Age: 67
DRG: 418 | End: 2017-07-27
Attending: SURGERY
Payer: MEDICARE

## 2017-07-27 ENCOUNTER — APPOINTMENT (OUTPATIENT)
Dept: NUCLEAR MEDICINE | Age: 67
DRG: 418 | End: 2017-07-27
Attending: SURGERY
Payer: MEDICARE

## 2017-07-27 PROBLEM — K56.7 POSTOPERATIVE ILEUS (HCC): Status: ACTIVE | Noted: 2017-07-27

## 2017-07-27 PROBLEM — K91.89 POSTOPERATIVE ILEUS (HCC): Status: ACTIVE | Noted: 2017-07-27

## 2017-07-27 LAB
ALBUMIN SERPL-MCNC: 2.8 G/DL (ref 3.5–5.1)
ALP BLD-CCNC: 58 U/L (ref 38–126)
ALT SERPL-CCNC: 28 U/L (ref 11–66)
ANION GAP SERPL CALCULATED.3IONS-SCNC: 12 MEQ/L (ref 8–16)
AST SERPL-CCNC: 43 U/L (ref 5–40)
BASOPHILS # BLD: 0.4 %
BASOPHILS ABSOLUTE: 0 THOU/MM3 (ref 0–0.1)
BILIRUB SERPL-MCNC: 3.6 MG/DL (ref 0.3–1.2)
BUN BLDV-MCNC: 9 MG/DL (ref 7–22)
CALCIUM IONIZED: 1.03 MMOL/L (ref 1.12–1.32)
CALCIUM IONIZED: 1.05 MMOL/L (ref 1.12–1.32)
CALCIUM SERPL-MCNC: 7.5 MG/DL (ref 8.5–10.5)
CHLORIDE BLD-SCNC: 105 MEQ/L (ref 98–111)
CO2: 21 MEQ/L (ref 23–33)
CREAT SERPL-MCNC: 0.5 MG/DL (ref 0.4–1.2)
EOSINOPHIL # BLD: 1 %
EOSINOPHILS ABSOLUTE: 0.1 THOU/MM3 (ref 0–0.4)
GFR SERPL CREATININE-BSD FRML MDRD: > 90 ML/MIN/1.73M2
GLUCOSE BLD-MCNC: 121 MG/DL (ref 70–108)
HCT VFR BLD CALC: 40.2 % (ref 37–47)
HEMOGLOBIN: 13.6 GM/DL (ref 12–16)
LYMPHOCYTES # BLD: 9.9 %
LYMPHOCYTES ABSOLUTE: 0.8 THOU/MM3 (ref 1–4.8)
MAGNESIUM: 2.1 MG/DL (ref 1.6–2.4)
MCH RBC QN AUTO: 33.4 PG (ref 27–31)
MCHC RBC AUTO-ENTMCNC: 33.9 GM/DL (ref 33–37)
MCV RBC AUTO: 98.7 FL (ref 81–99)
MONOCYTES # BLD: 6.5 %
MONOCYTES ABSOLUTE: 0.6 THOU/MM3 (ref 0.4–1.3)
NUCLEATED RED BLOOD CELLS: 0 /100 WBC
PDW BLD-RTO: 13.6 % (ref 11.5–14.5)
PHOSPHORUS: 1.5 MG/DL (ref 2.4–4.7)
PHOSPHORUS: 1.9 MG/DL (ref 2.4–4.7)
PLATELET # BLD: 136 THOU/MM3 (ref 130–400)
PMV BLD AUTO: 8.6 MCM (ref 7.4–10.4)
POTASSIUM SERPL-SCNC: 4.1 MEQ/L (ref 3.5–5.2)
RBC # BLD: 4.08 MILL/MM3 (ref 4.2–5.4)
RBC # BLD: NORMAL 10*6/UL
SEG NEUTROPHILS: 82.2 %
SEGMENTED NEUTROPHILS ABSOLUTE COUNT: 7 THOU/MM3 (ref 1.8–7.7)
SODIUM BLD-SCNC: 138 MEQ/L (ref 135–145)
TOTAL PROTEIN: 5.9 G/DL (ref 6.1–8)
WBC # BLD: 8.5 THOU/MM3 (ref 4.8–10.8)

## 2017-07-27 PROCEDURE — 2500000003 HC RX 250 WO HCPCS: Performed by: INTERNAL MEDICINE

## 2017-07-27 PROCEDURE — A9537 TC99M MEBROFENIN: HCPCS | Performed by: NURSE PRACTITIONER

## 2017-07-27 PROCEDURE — 83735 ASSAY OF MAGNESIUM: CPT

## 2017-07-27 PROCEDURE — 99024 POSTOP FOLLOW-UP VISIT: CPT | Performed by: NURSE PRACTITIONER

## 2017-07-27 PROCEDURE — 2580000003 HC RX 258: Performed by: INTERNAL MEDICINE

## 2017-07-27 PROCEDURE — A6257 TRANSPARENT FILM <= 16 SQ IN: HCPCS

## 2017-07-27 PROCEDURE — 6360000004 HC RX CONTRAST MEDICATION: Performed by: SURGERY

## 2017-07-27 PROCEDURE — 84100 ASSAY OF PHOSPHORUS: CPT

## 2017-07-27 PROCEDURE — 99024 POSTOP FOLLOW-UP VISIT: CPT | Performed by: SURGERY

## 2017-07-27 PROCEDURE — 74000 XR ABDOMEN LIMITED (KUB): CPT

## 2017-07-27 PROCEDURE — 36415 COLL VENOUS BLD VENIPUNCTURE: CPT

## 2017-07-27 PROCEDURE — 6370000000 HC RX 637 (ALT 250 FOR IP): Performed by: NURSE PRACTITIONER

## 2017-07-27 PROCEDURE — 2580000003 HC RX 258: Performed by: SURGERY

## 2017-07-27 PROCEDURE — 85025 COMPLETE CBC W/AUTO DIFF WBC: CPT

## 2017-07-27 PROCEDURE — 99232 SBSQ HOSP IP/OBS MODERATE 35: CPT | Performed by: INTERNAL MEDICINE

## 2017-07-27 PROCEDURE — 82330 ASSAY OF CALCIUM: CPT

## 2017-07-27 PROCEDURE — 71010 XR CHEST SINGLE FOR NG TUBE PLACEMENT: CPT

## 2017-07-27 PROCEDURE — 3430000000 HC RX DIAGNOSTIC RADIOPHARMACEUTICAL: Performed by: NURSE PRACTITIONER

## 2017-07-27 PROCEDURE — 75984 XRAY CONTROL CATHETER CHANGE: CPT

## 2017-07-27 PROCEDURE — 99232 SBSQ HOSP IP/OBS MODERATE 35: CPT | Performed by: NURSE PRACTITIONER

## 2017-07-27 PROCEDURE — 80053 COMPREHEN METABOLIC PANEL: CPT

## 2017-07-27 PROCEDURE — 6360000002 HC RX W HCPCS: Performed by: INTERNAL MEDICINE

## 2017-07-27 PROCEDURE — 6370000000 HC RX 637 (ALT 250 FOR IP): Performed by: INTERNAL MEDICINE

## 2017-07-27 PROCEDURE — 6360000002 HC RX W HCPCS: Performed by: SURGERY

## 2017-07-27 PROCEDURE — C9113 INJ PANTOPRAZOLE SODIUM, VIA: HCPCS | Performed by: INTERNAL MEDICINE

## 2017-07-27 PROCEDURE — 6370000000 HC RX 637 (ALT 250 FOR IP): Performed by: SURGERY

## 2017-07-27 PROCEDURE — 2140000000 HC CCU INTERMEDIATE R&B

## 2017-07-27 PROCEDURE — 78226 HEPATOBILIARY SYSTEM IMAGING: CPT

## 2017-07-27 RX ORDER — METOPROLOL TARTRATE 50 MG/1
50 TABLET, FILM COATED ORAL 2 TIMES DAILY
Status: DISCONTINUED | OUTPATIENT
Start: 2017-07-27 | End: 2017-07-27

## 2017-07-27 RX ORDER — METOPROLOL TARTRATE 5 MG/5ML
5 INJECTION INTRAVENOUS EVERY 8 HOURS
Status: DISCONTINUED | OUTPATIENT
Start: 2017-07-27 | End: 2017-07-28

## 2017-07-27 RX ORDER — SIMVASTATIN 40 MG
40 TABLET ORAL NIGHTLY
Status: DISCONTINUED | OUTPATIENT
Start: 2017-07-27 | End: 2017-08-01 | Stop reason: HOSPADM

## 2017-07-27 RX ORDER — PANTOPRAZOLE SODIUM 40 MG/10ML
40 INJECTION, POWDER, LYOPHILIZED, FOR SOLUTION INTRAVENOUS DAILY
Status: DISCONTINUED | OUTPATIENT
Start: 2017-07-27 | End: 2017-08-01 | Stop reason: HOSPADM

## 2017-07-27 RX ORDER — ASPIRIN 81 MG/1
81 TABLET ORAL DAILY
Status: DISCONTINUED | OUTPATIENT
Start: 2017-07-29 | End: 2017-08-01 | Stop reason: HOSPADM

## 2017-07-27 RX ORDER — POLYETHYLENE GLYCOL 3350 17 G/17G
17 POWDER, FOR SOLUTION ORAL ONCE
Status: DISCONTINUED | OUTPATIENT
Start: 2017-07-27 | End: 2017-08-01 | Stop reason: HOSPADM

## 2017-07-27 RX ORDER — OYSTER SHELL CALCIUM WITH VITAMIN D 500; 200 MG/1; [IU]/1
1 TABLET, FILM COATED ORAL 2 TIMES DAILY
Status: DISCONTINUED | OUTPATIENT
Start: 2017-07-27 | End: 2017-08-01 | Stop reason: HOSPADM

## 2017-07-27 RX ADMIN — CALCIUM CARBONATE-VITAMIN D TAB 500 MG-200 UNIT 1 TABLET: 500-200 TAB at 13:36

## 2017-07-27 RX ADMIN — SIMETHICONE 80 MG: 80 TABLET, CHEWABLE ORAL at 09:53

## 2017-07-27 RX ADMIN — METOPROLOL TARTRATE 25 MG: 25 TABLET ORAL at 13:04

## 2017-07-27 RX ADMIN — SODIUM CHLORIDE: 9 INJECTION, SOLUTION INTRAVENOUS at 13:00

## 2017-07-27 RX ADMIN — PIPERACILLIN SODIUM,TAZOBACTAM SODIUM 3.38 G: 3; .375 INJECTION, POWDER, FOR SOLUTION INTRAVENOUS at 23:28

## 2017-07-27 RX ADMIN — SODIUM CHLORIDE: 9 INJECTION, SOLUTION INTRAVENOUS at 05:14

## 2017-07-27 RX ADMIN — PANTOPRAZOLE SODIUM 40 MG: 40 INJECTION, POWDER, FOR SOLUTION INTRAVENOUS at 22:03

## 2017-07-27 RX ADMIN — METOPROLOL TARTRATE 25 MG: 25 TABLET ORAL at 09:52

## 2017-07-27 RX ADMIN — SODIUM CHLORIDE: 9 INJECTION, SOLUTION INTRAVENOUS at 20:52

## 2017-07-27 RX ADMIN — CALCIUM GLUCONATE 1.5 G: 94 INJECTION, SOLUTION INTRAVENOUS at 09:53

## 2017-07-27 RX ADMIN — IOPAMIDOL 80 ML: 755 INJECTION, SOLUTION INTRAVENOUS at 08:30

## 2017-07-27 RX ADMIN — Medication 9.2 MILLICURIE: at 11:25

## 2017-07-27 RX ADMIN — ASPIRIN 81 MG: 81 TABLET, COATED ORAL at 09:52

## 2017-07-27 RX ADMIN — DOCUSATE SODIUM 100 MG: 100 CAPSULE ORAL at 13:03

## 2017-07-27 RX ADMIN — METHYLNALTREXONE BROMIDE 12 MG: 12 INJECTION, SOLUTION SUBCUTANEOUS at 23:28

## 2017-07-27 RX ADMIN — HYDROMORPHONE HYDROCHLORIDE 1 MG: 1 INJECTION, SOLUTION INTRAMUSCULAR; INTRAVENOUS; SUBCUTANEOUS at 22:03

## 2017-07-27 RX ADMIN — SODIUM PHOSPHATE, MONOBASIC, MONOHYDRATE AND SODIUM PHOSPHATE, DIBASIC, ANHYDROUS 27.84 MMOL: 276; 142 INJECTION, SOLUTION INTRAVENOUS at 13:37

## 2017-07-27 ASSESSMENT — PAIN DESCRIPTION - ORIENTATION
ORIENTATION: UPPER
ORIENTATION: UPPER

## 2017-07-27 ASSESSMENT — PAIN SCALES - GENERAL
PAINLEVEL_OUTOF10: 0
PAINLEVEL_OUTOF10: 4
PAINLEVEL_OUTOF10: 6

## 2017-07-27 ASSESSMENT — PAIN DESCRIPTION - LOCATION
LOCATION: BACK
LOCATION: BACK

## 2017-07-27 ASSESSMENT — PAIN DESCRIPTION - DESCRIPTORS
DESCRIPTORS: ACHING
DESCRIPTORS: ACHING

## 2017-07-27 ASSESSMENT — PAIN DESCRIPTION - PAIN TYPE
TYPE: CHRONIC PAIN
TYPE: CHRONIC PAIN

## 2017-07-28 ENCOUNTER — ANESTHESIA EVENT (OUTPATIENT)
Dept: ENDOSCOPY | Age: 67
DRG: 418 | End: 2017-07-28
Payer: MEDICARE

## 2017-07-28 ENCOUNTER — ANESTHESIA (OUTPATIENT)
Dept: ENDOSCOPY | Age: 67
DRG: 418 | End: 2017-07-28
Payer: MEDICARE

## 2017-07-28 ENCOUNTER — APPOINTMENT (OUTPATIENT)
Dept: GENERAL RADIOLOGY | Age: 67
DRG: 418 | End: 2017-07-28
Attending: SURGERY
Payer: MEDICARE

## 2017-07-28 VITALS — OXYGEN SATURATION: 95 % | DIASTOLIC BLOOD PRESSURE: 76 MMHG | SYSTOLIC BLOOD PRESSURE: 147 MMHG

## 2017-07-28 LAB
ALBUMIN SERPL-MCNC: 2.7 G/DL (ref 3.5–5.1)
ALP BLD-CCNC: 63 U/L (ref 38–126)
ALT SERPL-CCNC: 20 U/L (ref 11–66)
ANION GAP SERPL CALCULATED.3IONS-SCNC: 12 MEQ/L (ref 8–16)
AST SERPL-CCNC: 29 U/L (ref 5–40)
BASOPHILS # BLD: 0.3 %
BASOPHILS ABSOLUTE: 0 THOU/MM3 (ref 0–0.1)
BILIRUB SERPL-MCNC: 4.3 MG/DL (ref 0.3–1.2)
BUN BLDV-MCNC: 11 MG/DL (ref 7–22)
CALCIUM IONIZED: 1.16 MMOL/L (ref 1.12–1.32)
CALCIUM SERPL-MCNC: 8.5 MG/DL (ref 8.5–10.5)
CHLORIDE BLD-SCNC: 107 MEQ/L (ref 98–111)
CO2: 22 MEQ/L (ref 23–33)
CREAT SERPL-MCNC: 0.6 MG/DL (ref 0.4–1.2)
EOSINOPHIL # BLD: 3.7 %
EOSINOPHILS ABSOLUTE: 0.3 THOU/MM3 (ref 0–0.4)
GFR SERPL CREATININE-BSD FRML MDRD: > 90 ML/MIN/1.73M2
GLUCOSE BLD-MCNC: 126 MG/DL (ref 70–108)
HCT VFR BLD CALC: 36.8 % (ref 37–47)
HEMOGLOBIN: 12.8 GM/DL (ref 12–16)
LYMPHOCYTES # BLD: 10.5 %
LYMPHOCYTES ABSOLUTE: 0.8 THOU/MM3 (ref 1–4.8)
MAGNESIUM: 2.1 MG/DL (ref 1.6–2.4)
MCH RBC QN AUTO: 34.3 PG (ref 27–31)
MCHC RBC AUTO-ENTMCNC: 34.9 GM/DL (ref 33–37)
MCV RBC AUTO: 98.2 FL (ref 81–99)
MONOCYTES # BLD: 6 %
MONOCYTES ABSOLUTE: 0.5 THOU/MM3 (ref 0.4–1.3)
NUCLEATED RED BLOOD CELLS: 0 /100 WBC
PDW BLD-RTO: 14.3 % (ref 11.5–14.5)
PHOSPHORUS: 2 MG/DL (ref 2.4–4.7)
PLATELET # BLD: 156 THOU/MM3 (ref 130–400)
PMV BLD AUTO: 8.3 MCM (ref 7.4–10.4)
POTASSIUM SERPL-SCNC: 4.2 MEQ/L (ref 3.5–5.2)
RBC # BLD: 3.74 MILL/MM3 (ref 4.2–5.4)
RBC # BLD: NORMAL 10*6/UL
SEG NEUTROPHILS: 79.5 %
SEGMENTED NEUTROPHILS ABSOLUTE COUNT: 6 THOU/MM3 (ref 1.8–7.7)
SODIUM BLD-SCNC: 141 MEQ/L (ref 135–145)
TOTAL PROTEIN: 5.6 G/DL (ref 6.1–8)
WBC # BLD: 7.5 THOU/MM3 (ref 4.8–10.8)

## 2017-07-28 PROCEDURE — 82330 ASSAY OF CALCIUM: CPT

## 2017-07-28 PROCEDURE — 6360000002 HC RX W HCPCS: Performed by: SURGERY

## 2017-07-28 PROCEDURE — 6360000002 HC RX W HCPCS

## 2017-07-28 PROCEDURE — 84100 ASSAY OF PHOSPHORUS: CPT

## 2017-07-28 PROCEDURE — 2580000003 HC RX 258: Performed by: NURSE ANESTHETIST, CERTIFIED REGISTERED

## 2017-07-28 PROCEDURE — 99232 SBSQ HOSP IP/OBS MODERATE 35: CPT | Performed by: INTERNAL MEDICINE

## 2017-07-28 PROCEDURE — 2140000000 HC CCU INTERMEDIATE R&B

## 2017-07-28 PROCEDURE — 2500000003 HC RX 250 WO HCPCS: Performed by: INTERNAL MEDICINE

## 2017-07-28 PROCEDURE — 2500000003 HC RX 250 WO HCPCS

## 2017-07-28 PROCEDURE — 7100000001 HC PACU RECOVERY - ADDTL 15 MIN: Performed by: INTERNAL MEDICINE

## 2017-07-28 PROCEDURE — 3609018800 HC ERCP DX COLLECTION SPECIMEN BRUSHING/WASHING: Performed by: INTERNAL MEDICINE

## 2017-07-28 PROCEDURE — 2580000003 HC RX 258: Performed by: SURGERY

## 2017-07-28 PROCEDURE — 2720000010 HC SURG SUPPLY STERILE: Performed by: INTERNAL MEDICINE

## 2017-07-28 PROCEDURE — 6360000002 HC RX W HCPCS: Performed by: INTERNAL MEDICINE

## 2017-07-28 PROCEDURE — 6360000002 HC RX W HCPCS: Performed by: NURSE ANESTHETIST, CERTIFIED REGISTERED

## 2017-07-28 PROCEDURE — C9113 INJ PANTOPRAZOLE SODIUM, VIA: HCPCS | Performed by: INTERNAL MEDICINE

## 2017-07-28 PROCEDURE — 2580000003 HC RX 258: Performed by: INTERNAL MEDICINE

## 2017-07-28 PROCEDURE — 80053 COMPREHEN METABOLIC PANEL: CPT

## 2017-07-28 PROCEDURE — C2625 STENT, NON-COR, TEM W/DEL SY: HCPCS | Performed by: INTERNAL MEDICINE

## 2017-07-28 PROCEDURE — 99024 POSTOP FOLLOW-UP VISIT: CPT | Performed by: SURGERY

## 2017-07-28 PROCEDURE — 2500000003 HC RX 250 WO HCPCS: Performed by: NURSE ANESTHETIST, CERTIFIED REGISTERED

## 2017-07-28 PROCEDURE — 83735 ASSAY OF MAGNESIUM: CPT

## 2017-07-28 PROCEDURE — 2500000003 HC RX 250 WO HCPCS: Performed by: PHYSICIAN ASSISTANT

## 2017-07-28 PROCEDURE — 99024 POSTOP FOLLOW-UP VISIT: CPT | Performed by: NURSE PRACTITIONER

## 2017-07-28 PROCEDURE — 3700000000 HC ANESTHESIA ATTENDED CARE: Performed by: INTERNAL MEDICINE

## 2017-07-28 PROCEDURE — 3700000001 HC ADD 15 MINUTES (ANESTHESIA): Performed by: INTERNAL MEDICINE

## 2017-07-28 PROCEDURE — 85025 COMPLETE CBC W/AUTO DIFF WBC: CPT

## 2017-07-28 PROCEDURE — 36415 COLL VENOUS BLD VENIPUNCTURE: CPT

## 2017-07-28 PROCEDURE — 74328 X-RAY BILE DUCT ENDOSCOPY: CPT

## 2017-07-28 PROCEDURE — 3609015100 HC ERCP STENT PLACEMENT BILIARY/PANCREATIC DUCT: Performed by: INTERNAL MEDICINE

## 2017-07-28 PROCEDURE — 0F7D8DZ DILATION OF PANCREATIC DUCT WITH INTRALUMINAL DEVICE, VIA NATURAL OR ARTIFICIAL OPENING ENDOSCOPIC: ICD-10-PCS | Performed by: INTERNAL MEDICINE

## 2017-07-28 PROCEDURE — 7100000000 HC PACU RECOVERY - FIRST 15 MIN: Performed by: INTERNAL MEDICINE

## 2017-07-28 DEVICE — BILIARY STENT WITH RX DELIVERY SYSTEM
Type: IMPLANTABLE DEVICE | Status: FUNCTIONAL
Brand: RX BILIARY

## 2017-07-28 DEVICE — PANCREATIC STENT KIT
Type: IMPLANTABLE DEVICE | Status: FUNCTIONAL
Brand: ADVANIX™ PANCREATIC STENT KIT

## 2017-07-28 RX ORDER — GLYCOPYRROLATE 0.2 MG/ML
INJECTION INTRAMUSCULAR; INTRAVENOUS PRN
Status: DISCONTINUED | OUTPATIENT
Start: 2017-07-28 | End: 2017-07-28 | Stop reason: SDUPTHER

## 2017-07-28 RX ORDER — FENTANYL CITRATE 50 UG/ML
INJECTION, SOLUTION INTRAMUSCULAR; INTRAVENOUS PRN
Status: DISCONTINUED | OUTPATIENT
Start: 2017-07-28 | End: 2017-07-28 | Stop reason: SDUPTHER

## 2017-07-28 RX ORDER — PROPOFOL 10 MG/ML
INJECTION, EMULSION INTRAVENOUS CONTINUOUS PRN
Status: DISCONTINUED | OUTPATIENT
Start: 2017-07-28 | End: 2017-07-28 | Stop reason: SDUPTHER

## 2017-07-28 RX ORDER — SODIUM CHLORIDE 9 MG/ML
INJECTION, SOLUTION INTRAVENOUS CONTINUOUS PRN
Status: DISCONTINUED | OUTPATIENT
Start: 2017-07-28 | End: 2017-07-28 | Stop reason: SDUPTHER

## 2017-07-28 RX ORDER — PROPOFOL 10 MG/ML
INJECTION, EMULSION INTRAVENOUS PRN
Status: DISCONTINUED | OUTPATIENT
Start: 2017-07-28 | End: 2017-07-28 | Stop reason: SDUPTHER

## 2017-07-28 RX ORDER — LIDOCAINE HYDROCHLORIDE 20 MG/ML
INJECTION, SOLUTION INFILTRATION; PERINEURAL PRN
Status: DISCONTINUED | OUTPATIENT
Start: 2017-07-28 | End: 2017-07-28 | Stop reason: SDUPTHER

## 2017-07-28 RX ORDER — METOPROLOL TARTRATE 5 MG/5ML
5 INJECTION INTRAVENOUS EVERY 6 HOURS
Status: DISCONTINUED | OUTPATIENT
Start: 2017-07-28 | End: 2017-07-29

## 2017-07-28 RX ADMIN — PROPOFOL 50 MG: 10 INJECTION, EMULSION INTRAVENOUS at 07:48

## 2017-07-28 RX ADMIN — METOPROLOL TARTRATE 5 MG: 5 INJECTION INTRAVENOUS at 15:56

## 2017-07-28 RX ADMIN — METOPROLOL TARTRATE 5 MG: 5 INJECTION INTRAVENOUS at 21:47

## 2017-07-28 RX ADMIN — HYDROMORPHONE HYDROCHLORIDE 1 MG: 1 INJECTION, SOLUTION INTRAMUSCULAR; INTRAVENOUS; SUBCUTANEOUS at 18:50

## 2017-07-28 RX ADMIN — PIPERACILLIN SODIUM,TAZOBACTAM SODIUM 3.38 G: 3; .375 INJECTION, POWDER, FOR SOLUTION INTRAVENOUS at 06:20

## 2017-07-28 RX ADMIN — METOPROLOL TARTRATE 5 MG: 5 INJECTION INTRAVENOUS at 06:16

## 2017-07-28 RX ADMIN — HYDROMORPHONE HYDROCHLORIDE 1 MG: 1 INJECTION, SOLUTION INTRAMUSCULAR; INTRAVENOUS; SUBCUTANEOUS at 04:32

## 2017-07-28 RX ADMIN — SODIUM CHLORIDE: 9 INJECTION, SOLUTION INTRAVENOUS at 19:56

## 2017-07-28 RX ADMIN — CALCIUM GLUCONATE 1.5 G: 94 INJECTION, SOLUTION INTRAVENOUS at 02:26

## 2017-07-28 RX ADMIN — PIPERACILLIN SODIUM,TAZOBACTAM SODIUM 3.38 G: 3; .375 INJECTION, POWDER, FOR SOLUTION INTRAVENOUS at 15:59

## 2017-07-28 RX ADMIN — METOPROLOL TARTRATE 5 MG: 5 INJECTION INTRAVENOUS at 00:15

## 2017-07-28 RX ADMIN — LIDOCAINE HYDROCHLORIDE 80 MG: 20 INJECTION, SOLUTION INFILTRATION; PERINEURAL at 09:10

## 2017-07-28 RX ADMIN — SODIUM CHLORIDE: 9 INJECTION, SOLUTION INTRAVENOUS at 07:38

## 2017-07-28 RX ADMIN — GLYCOPYRROLATE 0.2 MG: 0.2 INJECTION, SOLUTION INTRAMUSCULAR; INTRAVENOUS at 08:28

## 2017-07-28 RX ADMIN — FENTANYL CITRATE 25 MCG: 50 INJECTION INTRAMUSCULAR; INTRAVENOUS at 07:50

## 2017-07-28 RX ADMIN — FENTANYL CITRATE 25 MCG: 50 INJECTION INTRAMUSCULAR; INTRAVENOUS at 07:40

## 2017-07-28 RX ADMIN — LIDOCAINE HYDROCHLORIDE 100 MG: 20 INJECTION, SOLUTION INFILTRATION; PERINEURAL at 07:48

## 2017-07-28 RX ADMIN — PROPOFOL 100 MCG/KG/MIN: 10 INJECTION, EMULSION INTRAVENOUS at 07:49

## 2017-07-28 RX ADMIN — PANTOPRAZOLE SODIUM 40 MG: 40 INJECTION, POWDER, FOR SOLUTION INTRAVENOUS at 06:07

## 2017-07-28 RX ADMIN — SODIUM CHLORIDE: 9 INJECTION, SOLUTION INTRAVENOUS at 11:35

## 2017-07-28 ASSESSMENT — PAIN SCALES - GENERAL
PAINLEVEL_OUTOF10: 6
PAINLEVEL_OUTOF10: 0
PAINLEVEL_OUTOF10: 1
PAINLEVEL_OUTOF10: 0
PAINLEVEL_OUTOF10: 4
PAINLEVEL_OUTOF10: 0
PAINLEVEL_OUTOF10: 4

## 2017-07-28 ASSESSMENT — PAIN DESCRIPTION - LOCATION
LOCATION: BACK
LOCATION: BACK;ABDOMEN

## 2017-07-28 ASSESSMENT — PAIN DESCRIPTION - ORIENTATION: ORIENTATION: UPPER

## 2017-07-28 ASSESSMENT — PAIN DESCRIPTION - PAIN TYPE
TYPE: CHRONIC PAIN;SURGICAL PAIN
TYPE: CHRONIC PAIN

## 2017-07-28 ASSESSMENT — PAIN DESCRIPTION - DESCRIPTORS
DESCRIPTORS: ACHING
DESCRIPTORS: ACHING

## 2017-07-29 LAB
ALBUMIN SERPL-MCNC: 2.9 G/DL (ref 3.5–5.1)
ALP BLD-CCNC: 85 U/L (ref 38–126)
ALT SERPL-CCNC: 19 U/L (ref 11–66)
ANION GAP SERPL CALCULATED.3IONS-SCNC: 13 MEQ/L (ref 8–16)
AST SERPL-CCNC: 28 U/L (ref 5–40)
BASOPHILS # BLD: 0.4 %
BASOPHILS ABSOLUTE: 0 THOU/MM3 (ref 0–0.1)
BILIRUB SERPL-MCNC: 5.2 MG/DL (ref 0.3–1.2)
BILIRUBIN DIRECT: 3.5 MG/DL (ref 0–0.3)
BUN BLDV-MCNC: 17 MG/DL (ref 7–22)
CALCIUM IONIZED: 1.06 MMOL/L (ref 1.12–1.32)
CALCIUM SERPL-MCNC: 8.1 MG/DL (ref 8.5–10.5)
CHLORIDE BLD-SCNC: 107 MEQ/L (ref 98–111)
CO2: 21 MEQ/L (ref 23–33)
CREAT SERPL-MCNC: 0.6 MG/DL (ref 0.4–1.2)
EOSINOPHIL # BLD: 4 %
EOSINOPHILS ABSOLUTE: 0.3 THOU/MM3 (ref 0–0.4)
GFR SERPL CREATININE-BSD FRML MDRD: > 90 ML/MIN/1.73M2
GLUCOSE BLD-MCNC: 124 MG/DL (ref 70–108)
HCT VFR BLD CALC: 40.9 % (ref 37–47)
HEMOGLOBIN: 14 GM/DL (ref 12–16)
LYMPHOCYTES # BLD: 9.6 %
LYMPHOCYTES ABSOLUTE: 0.7 THOU/MM3 (ref 1–4.8)
MAGNESIUM: 2.1 MG/DL (ref 1.6–2.4)
MCH RBC QN AUTO: 33.8 PG (ref 27–31)
MCHC RBC AUTO-ENTMCNC: 34.3 GM/DL (ref 33–37)
MCV RBC AUTO: 98.4 FL (ref 81–99)
MONOCYTES # BLD: 8.9 %
MONOCYTES ABSOLUTE: 0.6 THOU/MM3 (ref 0.4–1.3)
NUCLEATED RED BLOOD CELLS: 0 /100 WBC
PDW BLD-RTO: 14 % (ref 11.5–14.5)
PHOSPHORUS: 2.2 MG/DL (ref 2.4–4.7)
PLATELET # BLD: 209 THOU/MM3 (ref 130–400)
PMV BLD AUTO: 8.5 MCM (ref 7.4–10.4)
POTASSIUM SERPL-SCNC: 4.6 MEQ/L (ref 3.5–5.2)
RBC # BLD: 4.15 MILL/MM3 (ref 4.2–5.4)
RBC # BLD: NORMAL 10*6/UL
SEG NEUTROPHILS: 77.1 %
SEGMENTED NEUTROPHILS ABSOLUTE COUNT: 5.2 THOU/MM3 (ref 1.8–7.7)
SODIUM BLD-SCNC: 141 MEQ/L (ref 135–145)
TOTAL PROTEIN: 6.2 G/DL (ref 6.1–8)
WBC # BLD: 6.8 THOU/MM3 (ref 4.8–10.8)

## 2017-07-29 PROCEDURE — 6370000000 HC RX 637 (ALT 250 FOR IP): Performed by: INTERNAL MEDICINE

## 2017-07-29 PROCEDURE — 99024 POSTOP FOLLOW-UP VISIT: CPT | Performed by: SURGERY

## 2017-07-29 PROCEDURE — 36415 COLL VENOUS BLD VENIPUNCTURE: CPT

## 2017-07-29 PROCEDURE — 85025 COMPLETE CBC W/AUTO DIFF WBC: CPT

## 2017-07-29 PROCEDURE — 2500000003 HC RX 250 WO HCPCS: Performed by: PHYSICIAN ASSISTANT

## 2017-07-29 PROCEDURE — 6360000002 HC RX W HCPCS: Performed by: INTERNAL MEDICINE

## 2017-07-29 PROCEDURE — 80053 COMPREHEN METABOLIC PANEL: CPT

## 2017-07-29 PROCEDURE — 2580000003 HC RX 258: Performed by: INTERNAL MEDICINE

## 2017-07-29 PROCEDURE — 6370000000 HC RX 637 (ALT 250 FOR IP): Performed by: PHYSICIAN ASSISTANT

## 2017-07-29 PROCEDURE — 6370000000 HC RX 637 (ALT 250 FOR IP): Performed by: NURSE PRACTITIONER

## 2017-07-29 PROCEDURE — 2580000003 HC RX 258: Performed by: SURGERY

## 2017-07-29 PROCEDURE — 82330 ASSAY OF CALCIUM: CPT

## 2017-07-29 PROCEDURE — 2140000000 HC CCU INTERMEDIATE R&B

## 2017-07-29 PROCEDURE — C9113 INJ PANTOPRAZOLE SODIUM, VIA: HCPCS | Performed by: INTERNAL MEDICINE

## 2017-07-29 PROCEDURE — 6360000002 HC RX W HCPCS: Performed by: SURGERY

## 2017-07-29 PROCEDURE — 84100 ASSAY OF PHOSPHORUS: CPT

## 2017-07-29 PROCEDURE — 82248 BILIRUBIN DIRECT: CPT

## 2017-07-29 PROCEDURE — 83735 ASSAY OF MAGNESIUM: CPT

## 2017-07-29 RX ORDER — METOPROLOL TARTRATE 50 MG/1
50 TABLET, FILM COATED ORAL 2 TIMES DAILY
Status: DISCONTINUED | OUTPATIENT
Start: 2017-07-29 | End: 2017-08-01 | Stop reason: HOSPADM

## 2017-07-29 RX ORDER — ALVIMOPAN 12 MG/1
12 CAPSULE ORAL 2 TIMES DAILY
Status: COMPLETED | OUTPATIENT
Start: 2017-07-29 | End: 2017-07-30

## 2017-07-29 RX ADMIN — METOPROLOL TARTRATE 25 MG: 25 TABLET ORAL at 13:24

## 2017-07-29 RX ADMIN — PIPERACILLIN SODIUM,TAZOBACTAM SODIUM 3.38 G: 3; .375 INJECTION, POWDER, FOR SOLUTION INTRAVENOUS at 01:24

## 2017-07-29 RX ADMIN — HYDROMORPHONE HYDROCHLORIDE 1 MG: 1 INJECTION, SOLUTION INTRAMUSCULAR; INTRAVENOUS; SUBCUTANEOUS at 06:35

## 2017-07-29 RX ADMIN — SODIUM CHLORIDE: 9 INJECTION, SOLUTION INTRAVENOUS at 16:07

## 2017-07-29 RX ADMIN — CALCIUM CARBONATE-VITAMIN D TAB 500 MG-200 UNIT 1 TABLET: 500-200 TAB at 21:56

## 2017-07-29 RX ADMIN — SIMVASTATIN 40 MG: 40 TABLET, FILM COATED ORAL at 21:56

## 2017-07-29 RX ADMIN — HYDROMORPHONE HYDROCHLORIDE 1 MG: 1 INJECTION, SOLUTION INTRAMUSCULAR; INTRAVENOUS; SUBCUTANEOUS at 23:46

## 2017-07-29 RX ADMIN — CALCIUM GLUCONATE 1.5 G: 94 INJECTION, SOLUTION INTRAVENOUS at 08:57

## 2017-07-29 RX ADMIN — HYDROMORPHONE HYDROCHLORIDE 1 MG: 1 INJECTION, SOLUTION INTRAMUSCULAR; INTRAVENOUS; SUBCUTANEOUS at 18:37

## 2017-07-29 RX ADMIN — SODIUM CHLORIDE: 9 INJECTION, SOLUTION INTRAVENOUS at 03:56

## 2017-07-29 RX ADMIN — ASPIRIN 81 MG: 81 TABLET, COATED ORAL at 08:57

## 2017-07-29 RX ADMIN — ALVIMOPAN 12 MG: 12 CAPSULE ORAL at 21:56

## 2017-07-29 RX ADMIN — HYDROMORPHONE HYDROCHLORIDE 1 MG: 1 INJECTION, SOLUTION INTRAMUSCULAR; INTRAVENOUS; SUBCUTANEOUS at 13:15

## 2017-07-29 RX ADMIN — DOCUSATE SODIUM 100 MG: 100 CAPSULE ORAL at 08:57

## 2017-07-29 RX ADMIN — ALVIMOPAN 12 MG: 12 CAPSULE ORAL at 10:35

## 2017-07-29 RX ADMIN — CALCIUM CARBONATE-VITAMIN D TAB 500 MG-200 UNIT 1 TABLET: 500-200 TAB at 08:57

## 2017-07-29 RX ADMIN — PANTOPRAZOLE SODIUM 40 MG: 40 INJECTION, POWDER, FOR SOLUTION INTRAVENOUS at 06:47

## 2017-07-29 RX ADMIN — PIPERACILLIN SODIUM,TAZOBACTAM SODIUM 3.38 G: 3; .375 INJECTION, POWDER, FOR SOLUTION INTRAVENOUS at 16:47

## 2017-07-29 RX ADMIN — PIPERACILLIN SODIUM,TAZOBACTAM SODIUM 3.38 G: 3; .375 INJECTION, POWDER, FOR SOLUTION INTRAVENOUS at 10:29

## 2017-07-29 RX ADMIN — HYDROMORPHONE HYDROCHLORIDE 1 MG: 1 INJECTION, SOLUTION INTRAMUSCULAR; INTRAVENOUS; SUBCUTANEOUS at 01:25

## 2017-07-29 RX ADMIN — METOPROLOL TARTRATE 25 MG: 25 TABLET ORAL at 12:28

## 2017-07-29 RX ADMIN — METOPROLOL TARTRATE 50 MG: 50 TABLET, FILM COATED ORAL at 21:56

## 2017-07-29 RX ADMIN — METOPROLOL TARTRATE 5 MG: 5 INJECTION INTRAVENOUS at 06:34

## 2017-07-29 ASSESSMENT — PAIN DESCRIPTION - DESCRIPTORS
DESCRIPTORS: ACHING
DESCRIPTORS: ACHING

## 2017-07-29 ASSESSMENT — PAIN DESCRIPTION - LOCATION
LOCATION: BACK
LOCATION: BACK

## 2017-07-29 ASSESSMENT — PAIN SCALES - GENERAL
PAINLEVEL_OUTOF10: 6
PAINLEVEL_OUTOF10: 4
PAINLEVEL_OUTOF10: 6
PAINLEVEL_OUTOF10: 5
PAINLEVEL_OUTOF10: 4
PAINLEVEL_OUTOF10: 6
PAINLEVEL_OUTOF10: 7
PAINLEVEL_OUTOF10: 6

## 2017-07-29 ASSESSMENT — PAIN DESCRIPTION - ORIENTATION
ORIENTATION: UPPER
ORIENTATION: UPPER

## 2017-07-29 ASSESSMENT — PAIN DESCRIPTION - PAIN TYPE
TYPE: CHRONIC PAIN
TYPE: CHRONIC PAIN

## 2017-07-30 LAB
ALBUMIN SERPL-MCNC: 2.5 G/DL (ref 3.5–5.1)
ALP BLD-CCNC: 88 U/L (ref 38–126)
ALT SERPL-CCNC: 16 U/L (ref 11–66)
ANION GAP SERPL CALCULATED.3IONS-SCNC: 15 MEQ/L (ref 8–16)
AST SERPL-CCNC: 22 U/L (ref 5–40)
BASOPHILS # BLD: 0.3 %
BASOPHILS ABSOLUTE: 0 THOU/MM3 (ref 0–0.1)
BILIRUB SERPL-MCNC: 4.9 MG/DL (ref 0.3–1.2)
BUN BLDV-MCNC: 18 MG/DL (ref 7–22)
CALCIUM IONIZED: 1.02 MMOL/L (ref 1.12–1.32)
CALCIUM SERPL-MCNC: 7.8 MG/DL (ref 8.5–10.5)
CHLORIDE BLD-SCNC: 105 MEQ/L (ref 98–111)
CO2: 20 MEQ/L (ref 23–33)
CREAT SERPL-MCNC: 0.7 MG/DL (ref 0.4–1.2)
EOSINOPHIL # BLD: 1.6 %
EOSINOPHILS ABSOLUTE: 0.2 THOU/MM3 (ref 0–0.4)
GFR SERPL CREATININE-BSD FRML MDRD: 83 ML/MIN/1.73M2
GLUCOSE BLD-MCNC: 140 MG/DL (ref 70–108)
HCT VFR BLD CALC: 39.4 % (ref 37–47)
HEMOGLOBIN: 13.4 GM/DL (ref 12–16)
LYMPHOCYTES # BLD: 7.4 %
LYMPHOCYTES ABSOLUTE: 0.8 THOU/MM3 (ref 1–4.8)
MAGNESIUM: 1.8 MG/DL (ref 1.6–2.4)
MCH RBC QN AUTO: 33.4 PG (ref 27–31)
MCHC RBC AUTO-ENTMCNC: 34 GM/DL (ref 33–37)
MCV RBC AUTO: 98.1 FL (ref 81–99)
MONOCYTES # BLD: 5.6 %
MONOCYTES ABSOLUTE: 0.6 THOU/MM3 (ref 0.4–1.3)
NUCLEATED RED BLOOD CELLS: 0 /100 WBC
PDW BLD-RTO: 13.9 % (ref 11.5–14.5)
PHOSPHORUS: 2 MG/DL (ref 2.4–4.7)
PLATELET # BLD: 232 THOU/MM3 (ref 130–400)
PMV BLD AUTO: 8.4 MCM (ref 7.4–10.4)
POTASSIUM SERPL-SCNC: 3.9 MEQ/L (ref 3.5–5.2)
PRO-BNP: 722.2 PG/ML (ref 0–900)
RBC # BLD: 4.02 MILL/MM3 (ref 4.2–5.4)
RBC # BLD: NORMAL 10*6/UL
SEG NEUTROPHILS: 85.1 %
SEGMENTED NEUTROPHILS ABSOLUTE COUNT: 9 THOU/MM3 (ref 1.8–7.7)
SODIUM BLD-SCNC: 140 MEQ/L (ref 135–145)
TOTAL PROTEIN: 5.7 G/DL (ref 6.1–8)
WBC # BLD: 10.6 THOU/MM3 (ref 4.8–10.8)

## 2017-07-30 PROCEDURE — 2580000003 HC RX 258: Performed by: SURGERY

## 2017-07-30 PROCEDURE — 80053 COMPREHEN METABOLIC PANEL: CPT

## 2017-07-30 PROCEDURE — 6360000002 HC RX W HCPCS: Performed by: INTERNAL MEDICINE

## 2017-07-30 PROCEDURE — 83735 ASSAY OF MAGNESIUM: CPT

## 2017-07-30 PROCEDURE — 85025 COMPLETE CBC W/AUTO DIFF WBC: CPT

## 2017-07-30 PROCEDURE — 99232 SBSQ HOSP IP/OBS MODERATE 35: CPT | Performed by: PHYSICIAN ASSISTANT

## 2017-07-30 PROCEDURE — 84100 ASSAY OF PHOSPHORUS: CPT

## 2017-07-30 PROCEDURE — 36415 COLL VENOUS BLD VENIPUNCTURE: CPT

## 2017-07-30 PROCEDURE — 6360000002 HC RX W HCPCS: Performed by: SURGERY

## 2017-07-30 PROCEDURE — 99024 POSTOP FOLLOW-UP VISIT: CPT | Performed by: SURGERY

## 2017-07-30 PROCEDURE — 6370000000 HC RX 637 (ALT 250 FOR IP): Performed by: PHYSICIAN ASSISTANT

## 2017-07-30 PROCEDURE — 6370000000 HC RX 637 (ALT 250 FOR IP): Performed by: INTERNAL MEDICINE

## 2017-07-30 PROCEDURE — 83880 ASSAY OF NATRIURETIC PEPTIDE: CPT

## 2017-07-30 PROCEDURE — 2580000003 HC RX 258: Performed by: INTERNAL MEDICINE

## 2017-07-30 PROCEDURE — 82330 ASSAY OF CALCIUM: CPT

## 2017-07-30 PROCEDURE — C9113 INJ PANTOPRAZOLE SODIUM, VIA: HCPCS | Performed by: INTERNAL MEDICINE

## 2017-07-30 PROCEDURE — 6370000000 HC RX 637 (ALT 250 FOR IP): Performed by: NURSE PRACTITIONER

## 2017-07-30 PROCEDURE — 2140000000 HC CCU INTERMEDIATE R&B

## 2017-07-30 RX ORDER — FUROSEMIDE 10 MG/ML
20 INJECTION INTRAMUSCULAR; INTRAVENOUS ONCE
Status: COMPLETED | OUTPATIENT
Start: 2017-07-30 | End: 2017-07-30

## 2017-07-30 RX ADMIN — ALVIMOPAN 12 MG: 12 CAPSULE ORAL at 21:14

## 2017-07-30 RX ADMIN — ASPIRIN 81 MG: 81 TABLET, COATED ORAL at 08:07

## 2017-07-30 RX ADMIN — CALCIUM CARBONATE-VITAMIN D TAB 500 MG-200 UNIT 1 TABLET: 500-200 TAB at 08:07

## 2017-07-30 RX ADMIN — METOPROLOL TARTRATE 50 MG: 50 TABLET, FILM COATED ORAL at 21:14

## 2017-07-30 RX ADMIN — PIPERACILLIN SODIUM,TAZOBACTAM SODIUM 3.38 G: 3; .375 INJECTION, POWDER, FOR SOLUTION INTRAVENOUS at 17:47

## 2017-07-30 RX ADMIN — HYDROMORPHONE HYDROCHLORIDE 1 MG: 1 INJECTION, SOLUTION INTRAMUSCULAR; INTRAVENOUS; SUBCUTANEOUS at 23:28

## 2017-07-30 RX ADMIN — ALVIMOPAN 12 MG: 12 CAPSULE ORAL at 08:07

## 2017-07-30 RX ADMIN — HYDROMORPHONE HYDROCHLORIDE 1 MG: 1 INJECTION, SOLUTION INTRAMUSCULAR; INTRAVENOUS; SUBCUTANEOUS at 06:01

## 2017-07-30 RX ADMIN — PIPERACILLIN SODIUM,TAZOBACTAM SODIUM 3.38 G: 3; .375 INJECTION, POWDER, FOR SOLUTION INTRAVENOUS at 01:15

## 2017-07-30 RX ADMIN — CALCIUM CARBONATE-VITAMIN D TAB 500 MG-200 UNIT 1 TABLET: 500-200 TAB at 21:14

## 2017-07-30 RX ADMIN — SIMVASTATIN 40 MG: 40 TABLET, FILM COATED ORAL at 21:14

## 2017-07-30 RX ADMIN — PANTOPRAZOLE SODIUM 40 MG: 40 INJECTION, POWDER, FOR SOLUTION INTRAVENOUS at 06:11

## 2017-07-30 RX ADMIN — HYDROMORPHONE HYDROCHLORIDE 1 MG: 1 INJECTION, SOLUTION INTRAMUSCULAR; INTRAVENOUS; SUBCUTANEOUS at 17:52

## 2017-07-30 RX ADMIN — PIPERACILLIN SODIUM,TAZOBACTAM SODIUM 3.38 G: 3; .375 INJECTION, POWDER, FOR SOLUTION INTRAVENOUS at 12:25

## 2017-07-30 RX ADMIN — SODIUM CHLORIDE: 9 INJECTION, SOLUTION INTRAVENOUS at 08:10

## 2017-07-30 RX ADMIN — FUROSEMIDE 20 MG: 10 INJECTION, SOLUTION INTRAMUSCULAR; INTRAVENOUS at 14:11

## 2017-07-30 RX ADMIN — DOCUSATE SODIUM 100 MG: 100 CAPSULE ORAL at 08:07

## 2017-07-30 RX ADMIN — HYDROMORPHONE HYDROCHLORIDE 1 MG: 1 INJECTION, SOLUTION INTRAMUSCULAR; INTRAVENOUS; SUBCUTANEOUS at 12:43

## 2017-07-30 RX ADMIN — SODIUM CHLORIDE: 9 INJECTION, SOLUTION INTRAVENOUS at 00:34

## 2017-07-30 RX ADMIN — CALCIUM GLUCONATE 2 G: 94 INJECTION, SOLUTION INTRAVENOUS at 23:28

## 2017-07-30 RX ADMIN — METOPROLOL TARTRATE 50 MG: 50 TABLET, FILM COATED ORAL at 08:07

## 2017-07-30 ASSESSMENT — PAIN SCALES - GENERAL
PAINLEVEL_OUTOF10: 0
PAINLEVEL_OUTOF10: 7
PAINLEVEL_OUTOF10: 0
PAINLEVEL_OUTOF10: 6
PAINLEVEL_OUTOF10: 2
PAINLEVEL_OUTOF10: 6
PAINLEVEL_OUTOF10: 0
PAINLEVEL_OUTOF10: 7
PAINLEVEL_OUTOF10: 3
PAINLEVEL_OUTOF10: 6
PAINLEVEL_OUTOF10: 0
PAINLEVEL_OUTOF10: 6

## 2017-07-30 ASSESSMENT — PAIN DESCRIPTION - LOCATION
LOCATION: ABDOMEN
LOCATION: ABDOMEN

## 2017-07-30 ASSESSMENT — PAIN DESCRIPTION - PAIN TYPE
TYPE: SURGICAL PAIN
TYPE: ACUTE PAIN

## 2017-07-31 ENCOUNTER — APPOINTMENT (OUTPATIENT)
Dept: NUCLEAR MEDICINE | Age: 67
DRG: 418 | End: 2017-07-31
Attending: SURGERY
Payer: MEDICARE

## 2017-07-31 LAB
ALBUMIN SERPL-MCNC: 2.3 G/DL (ref 3.5–5.1)
ALP BLD-CCNC: 96 U/L (ref 38–126)
ALT SERPL-CCNC: 13 U/L (ref 11–66)
ANION GAP SERPL CALCULATED.3IONS-SCNC: 12 MEQ/L (ref 8–16)
AST SERPL-CCNC: 20 U/L (ref 5–40)
BASOPHILS # BLD: 0.3 %
BASOPHILS ABSOLUTE: 0 THOU/MM3 (ref 0–0.1)
BILIRUB SERPL-MCNC: 4.4 MG/DL (ref 0.3–1.2)
BUN BLDV-MCNC: 15 MG/DL (ref 7–22)
CALCIUM IONIZED: 1.08 MMOL/L (ref 1.12–1.32)
CALCIUM IONIZED: 1.1 MMOL/L (ref 1.12–1.32)
CALCIUM SERPL-MCNC: 8 MG/DL (ref 8.5–10.5)
CHLORIDE BLD-SCNC: 104 MEQ/L (ref 98–111)
CO2: 22 MEQ/L (ref 23–33)
CREAT SERPL-MCNC: 0.7 MG/DL (ref 0.4–1.2)
EOSINOPHIL # BLD: 5 %
EOSINOPHILS ABSOLUTE: 0.5 THOU/MM3 (ref 0–0.4)
GFR SERPL CREATININE-BSD FRML MDRD: 83 ML/MIN/1.73M2
GLUCOSE BLD-MCNC: 130 MG/DL (ref 70–108)
HCT VFR BLD CALC: 34.9 % (ref 37–47)
HEMOGLOBIN: 12 GM/DL (ref 12–16)
LYMPHOCYTES # BLD: 6.2 %
LYMPHOCYTES ABSOLUTE: 0.6 THOU/MM3 (ref 1–4.8)
MAGNESIUM: 1.7 MG/DL (ref 1.6–2.4)
MCH RBC QN AUTO: 33.2 PG (ref 27–31)
MCHC RBC AUTO-ENTMCNC: 34.2 GM/DL (ref 33–37)
MCV RBC AUTO: 97.1 FL (ref 81–99)
MONOCYTES # BLD: 8 %
MONOCYTES ABSOLUTE: 0.8 THOU/MM3 (ref 0.4–1.3)
NUCLEATED RED BLOOD CELLS: 0 /100 WBC
PDW BLD-RTO: 13.8 % (ref 11.5–14.5)
PHOSPHORUS: 1.4 MG/DL (ref 2.4–4.7)
PLATELET # BLD: 212 THOU/MM3 (ref 130–400)
PMV BLD AUTO: 8.8 MCM (ref 7.4–10.4)
POTASSIUM SERPL-SCNC: 3.4 MEQ/L (ref 3.5–5.2)
RBC # BLD: 3.6 MILL/MM3 (ref 4.2–5.4)
RBC # BLD: NORMAL 10*6/UL
SEG NEUTROPHILS: 80.5 %
SEGMENTED NEUTROPHILS ABSOLUTE COUNT: 7.7 THOU/MM3 (ref 1.8–7.7)
SODIUM BLD-SCNC: 138 MEQ/L (ref 135–145)
TOTAL PROTEIN: 5.2 G/DL (ref 6.1–8)
WBC # BLD: 9.6 THOU/MM3 (ref 4.8–10.8)

## 2017-07-31 PROCEDURE — 6360000002 HC RX W HCPCS: Performed by: SURGERY

## 2017-07-31 PROCEDURE — 6370000000 HC RX 637 (ALT 250 FOR IP): Performed by: INTERNAL MEDICINE

## 2017-07-31 PROCEDURE — 6370000000 HC RX 637 (ALT 250 FOR IP): Performed by: PHYSICIAN ASSISTANT

## 2017-07-31 PROCEDURE — 2580000003 HC RX 258: Performed by: SURGERY

## 2017-07-31 PROCEDURE — 1200000000 HC SEMI PRIVATE

## 2017-07-31 PROCEDURE — 2580000003 HC RX 258: Performed by: INTERNAL MEDICINE

## 2017-07-31 PROCEDURE — 83735 ASSAY OF MAGNESIUM: CPT

## 2017-07-31 PROCEDURE — 3430000000 HC RX DIAGNOSTIC RADIOPHARMACEUTICAL: Performed by: SURGERY

## 2017-07-31 PROCEDURE — 82330 ASSAY OF CALCIUM: CPT

## 2017-07-31 PROCEDURE — 2500000003 HC RX 250 WO HCPCS: Performed by: SURGERY

## 2017-07-31 PROCEDURE — 99024 POSTOP FOLLOW-UP VISIT: CPT | Performed by: SURGERY

## 2017-07-31 PROCEDURE — 6370000000 HC RX 637 (ALT 250 FOR IP): Performed by: NURSE PRACTITIONER

## 2017-07-31 PROCEDURE — 80053 COMPREHEN METABOLIC PANEL: CPT

## 2017-07-31 PROCEDURE — 84100 ASSAY OF PHOSPHORUS: CPT

## 2017-07-31 PROCEDURE — 36415 COLL VENOUS BLD VENIPUNCTURE: CPT

## 2017-07-31 PROCEDURE — 78226 HEPATOBILIARY SYSTEM IMAGING: CPT

## 2017-07-31 PROCEDURE — 6360000002 HC RX W HCPCS: Performed by: INTERNAL MEDICINE

## 2017-07-31 PROCEDURE — 85025 COMPLETE CBC W/AUTO DIFF WBC: CPT

## 2017-07-31 PROCEDURE — 6370000000 HC RX 637 (ALT 250 FOR IP): Performed by: SURGERY

## 2017-07-31 PROCEDURE — C9113 INJ PANTOPRAZOLE SODIUM, VIA: HCPCS | Performed by: INTERNAL MEDICINE

## 2017-07-31 PROCEDURE — 99024 POSTOP FOLLOW-UP VISIT: CPT | Performed by: NURSE PRACTITIONER

## 2017-07-31 PROCEDURE — A9537 TC99M MEBROFENIN: HCPCS | Performed by: SURGERY

## 2017-07-31 RX ORDER — POTASSIUM CHLORIDE 20 MEQ/1
40 TABLET, EXTENDED RELEASE ORAL ONCE
Status: COMPLETED | OUTPATIENT
Start: 2017-07-31 | End: 2017-07-31

## 2017-07-31 RX ADMIN — Medication 9.3 MILLICURIE: at 18:20

## 2017-07-31 RX ADMIN — CALCIUM CARBONATE-VITAMIN D TAB 500 MG-200 UNIT 1 TABLET: 500-200 TAB at 20:13

## 2017-07-31 RX ADMIN — PIPERACILLIN SODIUM,TAZOBACTAM SODIUM 3.38 G: 3; .375 INJECTION, POWDER, FOR SOLUTION INTRAVENOUS at 20:13

## 2017-07-31 RX ADMIN — METOPROLOL TARTRATE 50 MG: 50 TABLET, FILM COATED ORAL at 20:13

## 2017-07-31 RX ADMIN — CALCIUM CARBONATE-VITAMIN D TAB 500 MG-200 UNIT 1 TABLET: 500-200 TAB at 08:52

## 2017-07-31 RX ADMIN — HYDROMORPHONE HYDROCHLORIDE 1 MG: 1 INJECTION, SOLUTION INTRAMUSCULAR; INTRAVENOUS; SUBCUTANEOUS at 20:14

## 2017-07-31 RX ADMIN — ASPIRIN 81 MG: 81 TABLET, COATED ORAL at 08:52

## 2017-07-31 RX ADMIN — METOPROLOL TARTRATE 50 MG: 50 TABLET, FILM COATED ORAL at 08:52

## 2017-07-31 RX ADMIN — CALCIUM GLUCONATE 2 G: 94 INJECTION, SOLUTION INTRAVENOUS at 13:40

## 2017-07-31 RX ADMIN — PIPERACILLIN SODIUM,TAZOBACTAM SODIUM 3.38 G: 3; .375 INJECTION, POWDER, FOR SOLUTION INTRAVENOUS at 01:55

## 2017-07-31 RX ADMIN — HYDROMORPHONE HYDROCHLORIDE 1 MG: 1 INJECTION, SOLUTION INTRAMUSCULAR; INTRAVENOUS; SUBCUTANEOUS at 11:39

## 2017-07-31 RX ADMIN — PIPERACILLIN SODIUM,TAZOBACTAM SODIUM 3.38 G: 3; .375 INJECTION, POWDER, FOR SOLUTION INTRAVENOUS at 13:40

## 2017-07-31 RX ADMIN — SIMVASTATIN 40 MG: 40 TABLET, FILM COATED ORAL at 20:13

## 2017-07-31 RX ADMIN — SODIUM PHOSPHATE, MONOBASIC, MONOHYDRATE AND SODIUM PHOSPHATE, DIBASIC, ANHYDROUS 26 MMOL: 276; 142 INJECTION, SOLUTION INTRAVENOUS at 08:52

## 2017-07-31 RX ADMIN — HYDROMORPHONE HYDROCHLORIDE 1 MG: 1 INJECTION, SOLUTION INTRAMUSCULAR; INTRAVENOUS; SUBCUTANEOUS at 03:52

## 2017-07-31 RX ADMIN — DOCUSATE SODIUM 100 MG: 100 CAPSULE ORAL at 08:52

## 2017-07-31 RX ADMIN — PANTOPRAZOLE SODIUM 40 MG: 40 INJECTION, POWDER, FOR SOLUTION INTRAVENOUS at 05:53

## 2017-07-31 RX ADMIN — POTASSIUM CHLORIDE 40 MEQ: 1500 TABLET, EXTENDED RELEASE ORAL at 08:52

## 2017-07-31 ASSESSMENT — PAIN DESCRIPTION - LOCATION
LOCATION: ABDOMEN
LOCATION: ABDOMEN

## 2017-07-31 ASSESSMENT — PAIN SCALES - GENERAL
PAINLEVEL_OUTOF10: 3
PAINLEVEL_OUTOF10: 6
PAINLEVEL_OUTOF10: 6
PAINLEVEL_OUTOF10: 5

## 2017-07-31 ASSESSMENT — PAIN DESCRIPTION - PAIN TYPE
TYPE: ACUTE PAIN
TYPE: ACUTE PAIN

## 2017-07-31 ASSESSMENT — PAIN DESCRIPTION - DESCRIPTORS: DESCRIPTORS: ACHING

## 2017-08-01 VITALS
BODY MASS INDEX: 33.77 KG/M2 | OXYGEN SATURATION: 90 % | HEART RATE: 101 BPM | WEIGHT: 228 LBS | HEIGHT: 69 IN | TEMPERATURE: 99.1 F | SYSTOLIC BLOOD PRESSURE: 119 MMHG | DIASTOLIC BLOOD PRESSURE: 70 MMHG | RESPIRATION RATE: 20 BRPM

## 2017-08-01 PROCEDURE — 2580000003 HC RX 258: Performed by: SURGERY

## 2017-08-01 PROCEDURE — 6360000002 HC RX W HCPCS: Performed by: SURGERY

## 2017-08-01 RX ADMIN — CALCIUM GLUCONATE 2 G: 94 INJECTION, SOLUTION INTRAVENOUS at 00:13

## 2017-08-01 RX ADMIN — HYDROMORPHONE HYDROCHLORIDE 1 MG: 1 INJECTION, SOLUTION INTRAMUSCULAR; INTRAVENOUS; SUBCUTANEOUS at 00:14

## 2017-08-01 ASSESSMENT — PAIN SCALES - GENERAL: PAINLEVEL_OUTOF10: 9

## 2017-08-18 ENCOUNTER — OFFICE VISIT (OUTPATIENT)
Dept: CARDIOLOGY CLINIC | Age: 67
End: 2017-08-18
Payer: MEDICARE

## 2017-08-18 VITALS
BODY MASS INDEX: 29.24 KG/M2 | WEIGHT: 198 LBS | HEART RATE: 91 BPM | DIASTOLIC BLOOD PRESSURE: 64 MMHG | SYSTOLIC BLOOD PRESSURE: 114 MMHG

## 2017-08-18 DIAGNOSIS — Z98.890 POST-OPERATIVE STATE: ICD-10-CM

## 2017-08-18 DIAGNOSIS — I47.1 NARROW COMPLEX TACHYCARDIA (HCC): Primary | ICD-10-CM

## 2017-08-18 PROCEDURE — 1090F PRES/ABSN URINE INCON ASSESS: CPT | Performed by: INTERNAL MEDICINE

## 2017-08-18 PROCEDURE — 1036F TOBACCO NON-USER: CPT | Performed by: INTERNAL MEDICINE

## 2017-08-18 PROCEDURE — G8427 DOCREV CUR MEDS BY ELIG CLIN: HCPCS | Performed by: INTERNAL MEDICINE

## 2017-08-18 PROCEDURE — 93000 ELECTROCARDIOGRAM COMPLETE: CPT | Performed by: INTERNAL MEDICINE

## 2017-08-18 PROCEDURE — 3014F SCREEN MAMMO DOC REV: CPT | Performed by: INTERNAL MEDICINE

## 2017-08-18 PROCEDURE — 3017F COLORECTAL CA SCREEN DOC REV: CPT | Performed by: INTERNAL MEDICINE

## 2017-08-18 PROCEDURE — 1123F ACP DISCUSS/DSCN MKR DOCD: CPT | Performed by: INTERNAL MEDICINE

## 2017-08-18 PROCEDURE — G8419 CALC BMI OUT NRM PARAM NOF/U: HCPCS | Performed by: INTERNAL MEDICINE

## 2017-08-18 PROCEDURE — 4040F PNEUMOC VAC/ADMIN/RCVD: CPT | Performed by: INTERNAL MEDICINE

## 2017-08-18 PROCEDURE — 99204 OFFICE O/P NEW MOD 45 MIN: CPT | Performed by: INTERNAL MEDICINE

## 2017-08-18 PROCEDURE — 1111F DSCHRG MED/CURRENT MED MERGE: CPT | Performed by: INTERNAL MEDICINE

## 2017-08-18 PROCEDURE — G8400 PT W/DXA NO RESULTS DOC: HCPCS | Performed by: INTERNAL MEDICINE

## 2017-08-18 RX ORDER — ACETAMINOPHEN 500 MG
1000 TABLET ORAL EVERY 6 HOURS PRN
COMMUNITY
End: 2017-11-29

## 2017-08-18 RX ORDER — DOCUSATE SODIUM 100 MG/1
100 CAPSULE, LIQUID FILLED ORAL 2 TIMES DAILY
Status: ON HOLD | COMMUNITY
End: 2017-09-26 | Stop reason: ALTCHOICE

## 2017-08-18 ASSESSMENT — ENCOUNTER SYMPTOMS
EYE DISCHARGE: 0
CONSTIPATION: 0
ABDOMINAL PAIN: 0
EYE PAIN: 0
DIARRHEA: 0
NAUSEA: 0
EYE REDNESS: 0
BACK PAIN: 0
SORE THROAT: 0
EYE ITCHING: 0
BLOOD IN STOOL: 0
COUGH: 0
SHORTNESS OF BREATH: 0
APNEA: 0
CHEST TIGHTNESS: 0
SINUS PRESSURE: 0
ABDOMINAL DISTENTION: 0

## 2017-09-26 ENCOUNTER — ANESTHESIA (OUTPATIENT)
Dept: ENDOSCOPY | Age: 67
End: 2017-09-26
Payer: MEDICARE

## 2017-09-26 ENCOUNTER — ANESTHESIA EVENT (OUTPATIENT)
Dept: ENDOSCOPY | Age: 67
End: 2017-09-26
Payer: MEDICARE

## 2017-09-26 ENCOUNTER — HOSPITAL ENCOUNTER (OUTPATIENT)
Age: 67
Setting detail: OUTPATIENT SURGERY
Discharge: HOME | End: 2017-09-26
Attending: INTERNAL MEDICINE | Admitting: INTERNAL MEDICINE
Payer: MEDICARE

## 2017-09-26 ENCOUNTER — APPOINTMENT (OUTPATIENT)
Dept: GENERAL RADIOLOGY | Age: 67
End: 2017-09-26
Attending: INTERNAL MEDICINE
Payer: MEDICARE

## 2017-09-26 VITALS
SYSTOLIC BLOOD PRESSURE: 119 MMHG | OXYGEN SATURATION: 100 % | DIASTOLIC BLOOD PRESSURE: 54 MMHG | RESPIRATION RATE: 19 BRPM

## 2017-09-26 VITALS
TEMPERATURE: 98.4 F | HEART RATE: 70 BPM | RESPIRATION RATE: 16 BRPM | WEIGHT: 171.6 LBS | OXYGEN SATURATION: 98 % | SYSTOLIC BLOOD PRESSURE: 113 MMHG | DIASTOLIC BLOOD PRESSURE: 48 MMHG | BODY MASS INDEX: 25.42 KG/M2 | HEIGHT: 69 IN

## 2017-09-26 DIAGNOSIS — I49.8 FLUTTERING HEART: ICD-10-CM

## 2017-09-26 DIAGNOSIS — I71.21 ASCENDING AORTIC ANEURYSM: Primary | ICD-10-CM

## 2017-09-26 DIAGNOSIS — Z90.49 S/P LAPAROSCOPIC CHOLECYSTECTOMY: ICD-10-CM

## 2017-09-26 DIAGNOSIS — R94.31 ABNORMAL EKG: ICD-10-CM

## 2017-09-26 PROCEDURE — 3700000000 HC ANESTHESIA ATTENDED CARE: Performed by: INTERNAL MEDICINE

## 2017-09-26 PROCEDURE — 3700000001 HC ADD 15 MINUTES (ANESTHESIA): Performed by: INTERNAL MEDICINE

## 2017-09-26 PROCEDURE — 74328 X-RAY BILE DUCT ENDOSCOPY: CPT

## 2017-09-26 PROCEDURE — 7100000000 HC PACU RECOVERY - FIRST 15 MIN: Performed by: INTERNAL MEDICINE

## 2017-09-26 PROCEDURE — 7100000001 HC PACU RECOVERY - ADDTL 15 MIN: Performed by: INTERNAL MEDICINE

## 2017-09-26 PROCEDURE — 6360000002 HC RX W HCPCS: Performed by: NURSE ANESTHETIST, CERTIFIED REGISTERED

## 2017-09-26 PROCEDURE — 2580000003 HC RX 258: Performed by: INTERNAL MEDICINE

## 2017-09-26 PROCEDURE — 6360000002 HC RX W HCPCS: Performed by: INTERNAL MEDICINE

## 2017-09-26 PROCEDURE — 2500000003 HC RX 250 WO HCPCS: Performed by: INTERNAL MEDICINE

## 2017-09-26 PROCEDURE — 3609014200 HC ERCP W/BIOPSY SINGLE/MULTIPLE: Performed by: INTERNAL MEDICINE

## 2017-09-26 PROCEDURE — 2500000003 HC RX 250 WO HCPCS: Performed by: NURSE ANESTHETIST, CERTIFIED REGISTERED

## 2017-09-26 PROCEDURE — 2720000010 HC SURG SUPPLY STERILE: Performed by: INTERNAL MEDICINE

## 2017-09-26 RX ORDER — PROPOFOL 10 MG/ML
INJECTION, EMULSION INTRAVENOUS CONTINUOUS PRN
Status: DISCONTINUED | OUTPATIENT
Start: 2017-09-26 | End: 2017-09-26 | Stop reason: SDUPTHER

## 2017-09-26 RX ORDER — LIDOCAINE HYDROCHLORIDE 20 MG/ML
INJECTION, SOLUTION INFILTRATION; PERINEURAL PRN
Status: DISCONTINUED | OUTPATIENT
Start: 2017-09-26 | End: 2017-09-26 | Stop reason: SDUPTHER

## 2017-09-26 RX ORDER — SODIUM CHLORIDE 450 MG/100ML
INJECTION, SOLUTION INTRAVENOUS CONTINUOUS
Status: DISCONTINUED | OUTPATIENT
Start: 2017-09-26 | End: 2017-09-26 | Stop reason: HOSPADM

## 2017-09-26 RX ORDER — PROPOFOL 10 MG/ML
INJECTION, EMULSION INTRAVENOUS PRN
Status: DISCONTINUED | OUTPATIENT
Start: 2017-09-26 | End: 2017-09-26 | Stop reason: SDUPTHER

## 2017-09-26 RX ORDER — FENTANYL CITRATE 50 UG/ML
INJECTION, SOLUTION INTRAMUSCULAR; INTRAVENOUS PRN
Status: DISCONTINUED | OUTPATIENT
Start: 2017-09-26 | End: 2017-09-26 | Stop reason: SDUPTHER

## 2017-09-26 RX ORDER — CIPROFLOXACIN 2 MG/ML
400 INJECTION, SOLUTION INTRAVENOUS ONCE
Status: COMPLETED | OUTPATIENT
Start: 2017-09-26 | End: 2017-09-26

## 2017-09-26 RX ORDER — ESMOLOL HYDROCHLORIDE 10 MG/ML
INJECTION INTRAVENOUS PRN
Status: DISCONTINUED | OUTPATIENT
Start: 2017-09-26 | End: 2017-09-26 | Stop reason: SDUPTHER

## 2017-09-26 RX ADMIN — PROPOFOL 20 MG: 10 INJECTION, EMULSION INTRAVENOUS at 07:51

## 2017-09-26 RX ADMIN — ESMOLOL HYDROCHLORIDE 20 MG: 10 INJECTION, SOLUTION INTRAVENOUS at 08:06

## 2017-09-26 RX ADMIN — PROPOFOL 20 MG: 10 INJECTION, EMULSION INTRAVENOUS at 07:40

## 2017-09-26 RX ADMIN — PROPOFOL 120 MCG/KG/MIN: 10 INJECTION, EMULSION INTRAVENOUS at 07:56

## 2017-09-26 RX ADMIN — PROPOFOL 20 MG: 10 INJECTION, EMULSION INTRAVENOUS at 07:47

## 2017-09-26 RX ADMIN — PROPOFOL 20 MG: 10 INJECTION, EMULSION INTRAVENOUS at 07:42

## 2017-09-26 RX ADMIN — PROPOFOL 20 MG: 10 INJECTION, EMULSION INTRAVENOUS at 07:50

## 2017-09-26 RX ADMIN — FENTANYL CITRATE 100 MCG: 50 INJECTION INTRAMUSCULAR; INTRAVENOUS at 07:38

## 2017-09-26 RX ADMIN — SODIUM CHLORIDE: 4.5 INJECTION, SOLUTION INTRAVENOUS at 07:15

## 2017-09-26 RX ADMIN — METRONIDAZOLE 500 MG: 500 INJECTION, SOLUTION INTRAVENOUS at 08:23

## 2017-09-26 RX ADMIN — PROPOFOL 40 MG: 10 INJECTION, EMULSION INTRAVENOUS at 07:38

## 2017-09-26 RX ADMIN — PROPOFOL 20 MG: 10 INJECTION, EMULSION INTRAVENOUS at 07:53

## 2017-09-26 RX ADMIN — PROPOFOL 20 MG: 10 INJECTION, EMULSION INTRAVENOUS at 07:55

## 2017-09-26 RX ADMIN — LIDOCAINE HYDROCHLORIDE 40 MG: 20 INJECTION, SOLUTION INFILTRATION; PERINEURAL at 07:39

## 2017-09-26 RX ADMIN — PROPOFOL 20 MG: 10 INJECTION, EMULSION INTRAVENOUS at 07:44

## 2017-09-26 RX ADMIN — PROPOFOL 20 MG: 10 INJECTION, EMULSION INTRAVENOUS at 07:48

## 2017-09-26 RX ADMIN — CIPROFLOXACIN 400 MG: 2 INJECTION, SOLUTION INTRAVENOUS at 07:15

## 2017-09-26 ASSESSMENT — PAIN SCALES - GENERAL
PAINLEVEL_OUTOF10: 0
PAINLEVEL_OUTOF10: 0

## 2017-09-26 ASSESSMENT — PAIN - FUNCTIONAL ASSESSMENT: PAIN_FUNCTIONAL_ASSESSMENT: 0-10

## 2017-10-06 ENCOUNTER — HOSPITAL ENCOUNTER (OUTPATIENT)
Dept: CT IMAGING | Age: 67
Discharge: HOME | End: 2017-10-06
Payer: MEDICARE

## 2017-10-06 ENCOUNTER — HOSPITAL ENCOUNTER (OUTPATIENT)
Dept: NON INVASIVE DIAGNOSTICS | Age: 67
Discharge: HOME | End: 2017-10-06
Payer: MEDICARE

## 2017-10-06 DIAGNOSIS — I71.21 ASCENDING AORTIC ANEURYSM: ICD-10-CM

## 2017-10-06 DIAGNOSIS — I47.1 NARROW COMPLEX TACHYCARDIA (HCC): ICD-10-CM

## 2017-10-06 DIAGNOSIS — I49.8 FLUTTERING HEART: ICD-10-CM

## 2017-10-06 DIAGNOSIS — R94.31 ABNORMAL EKG: ICD-10-CM

## 2017-10-06 DIAGNOSIS — Z90.49 S/P LAPAROSCOPIC CHOLECYSTECTOMY: ICD-10-CM

## 2017-10-06 LAB — POC CREATININE WHOLE BLOOD: 1 MG/DL (ref 0.5–1.2)

## 2017-10-06 PROCEDURE — 6360000004 HC RX CONTRAST MEDICATION: Performed by: INTERNAL MEDICINE

## 2017-10-06 PROCEDURE — 82565 ASSAY OF CREATININE: CPT

## 2017-10-06 PROCEDURE — 71275 CT ANGIOGRAPHY CHEST: CPT

## 2017-10-06 PROCEDURE — 93270 REMOTE 30 DAY ECG REV/REPORT: CPT

## 2017-10-06 RX ADMIN — IOPAMIDOL 110 ML: 755 INJECTION, SOLUTION INTRAVENOUS at 08:49

## 2017-10-06 NOTE — MR AVS SNAPSHOT
Today's Medication Changes      Notice           Cannot display patient medications because the patient has not yet been checked in. Your Current Medications Are              acetaminophen (TYLENOL) 500 MG tablet Take 1,000 mg by mouth every 6 hours as needed for Pain    metoprolol tartrate (LOPRESSOR) 25 MG tablet Take 25 mg by mouth 2 times daily    Calcium Citrate-Vitamin D 500-500 MG-UNIT CHEW Take 1 tablet by mouth 2 times daily     zoledronic acid (RECLAST) 5 MG/100ML SOLN Infuse 5 mg intravenously once Receives yearly infusion    simvastatin (ZOCOR) 40 MG tablet Take 40 mg by mouth nightly    chlordiazePOXIDE-clidinium (LIBRAX) 5-2.5 MG per capsule Take 1 capsule by mouth daily as needed      Allergies              Amitiza [Lubiprostone] Diarrhea    Bentyl [Dicyclomine Hcl]     Severe spasms    Lipitor [Atorvastatin]     Muscle aches    Soma [Carisoprodol] Nausea And Vomiting         Additional Information        Basic Information     Date Of Birth Sex Race Ethnicity Preferred Language    1950 Female White Non-/Non  English      Problem List as of 10/6/2017  Date Reviewed: 7/28/2017                Postoperative ileus ([facility] [address]    Abnormal EKG - ST depression; cardio recommends outpatient cardiac cath    Narrow complex tachycardia (HCC)    IBS (irritable bowel syndrome)    Hyperlipidemia    S/P laparoscopic cholecystectomy    Ascending aortic aneurysm, 4 cm per CT-chest from 2/71    Biliary colic    Osteoporosis      Preventive Care        Date Due    Hepatitis C screening is recommended for all adults regardless of risk factors born between [facility] at least once (lifetime) who have never been tested.  1950    Tetanus Combination Vaccine (1 - Tdap) 3/31/1969    Cholesterol Screening 3/31/1990    Diabetes Screening 3/31/1990    Colonoscopy 3/31/2000    Zoster Vaccine 3/31/2010    Pneumococcal Vaccines (two) for all adults aged 72 and over (1 of 2 - PCV13) 3/31/2015

## 2017-10-10 ENCOUNTER — TELEPHONE (OUTPATIENT)
Dept: CARDIOLOGY CLINIC | Age: 67
End: 2017-10-10

## 2017-10-11 NOTE — TELEPHONE ENCOUNTER
Pt called regarding her eliquis and that it would be needing a prior auth, she had it transferred from [facility] to [facility] in [place]. Called [facility] in [place] and was informed that we do and she will fax the prior auth to our office. Samples ready for pt in the meantime since she had to buy some from the pharmacy for $150. Samples pended.

## 2017-10-23 ENCOUNTER — TELEPHONE (OUTPATIENT)
Dept: CARDIOLOGY CLINIC | Age: 67
End: 2017-10-23

## 2017-10-23 NOTE — TELEPHONE ENCOUNTER
Prior auth for eliquis approved over the phone from 9-23-17 through 10-23-18 case #[id number]. Called and informed nestor at [employer] and had her process it. Called and informed pt.

## 2017-11-06 ENCOUNTER — TELEPHONE (OUTPATIENT)
Dept: CARDIOLOGY CLINIC | Age: 67
End: 2017-11-06

## 2017-11-06 NOTE — TELEPHONE ENCOUNTER
Pt called is concerned about B/P reading running high today and had a couple episode where she felt very cold and shaky. Pt states after she went to exercise this am she     Felt very cold and shaky checked her b/p and got several high readings 181/80,  156/105,200/150 just checked it hr after episode and its 148/91. Pt has appt 11/29/17    Dr. Esqueda Joy any changes or recommendations?

## 2017-11-08 NOTE — TELEPHONE ENCOUNTER
Please have her follow up with PCP to ensure she does not have a viral illness or something else going on to elevate her BP; PCP can titrate medications, and if they want me to do it, she needs to be seen. Thanks.

## 2017-11-13 NOTE — PROCEDURES
[address] [place] [address]                                   EVENT MONITOR    PATIENT NAME: Moise Martin                      :        1950  MED REC NO:   [id number]                           ROOM:  ACCOUNT NO:   [de-identified]                           ADMIT DATE: 10/06/2017  PROVIDER:     Doris Banegas    DATE OF SERVICE:  2017    TEST TYPE:  CARDIAC EVENT MONITOR. INDICATION:  Palpitation/question AV block. MONITORING:  10/06/2017 to 2017. REPORT:  The patient's baseline rhythm at the onset of monitoring was sinus  rhythm with intermittent PACs. The heart rate was approximately 66 beats  per minute. Thereafter, she reported on 10/06/2017 at 11:39 a.m., a  symptom of shortness of breath and she was sinus tachycardia with a heart  rate of 107. On 10/07/2017, she also reported shortness of breath, which  was approximately going 138 beats per minute, the monitor recorded sinus  tachycardia at 10:54 a.m. On 10/07/2017 at 19:31, the event monitor was  auto-triggered with what appears to be new-onset atrial  fibrillation/flutter, ranging from 90 to 130 beats per minute. On 10/08/2017, she also triggered for shortness of breath at 12:42 p.m. and  the rhythm was sinus tachycardia with a heart rate of 110. Approximately 1  hour later, it was auto-triggered with sinus tachycardia with paroxysmal  supraventricular tachycardia, ranging going to approximately 150 beats per  minute. Between 10/12/2017 and 10/27/2017, she activated the event monitor  for shortness of breath 4 times. Each time, the rhythm was sinus. There  was an incessant sinus tachycardia 236 beats per minute. There was also an  atrial run on 10/16 that went up to 95 beats per minute. This run was  approximately 2 beats. On 10/18/2017 at 11:26 a.m., she had sinus rhythm  with PACs going at 90 beats per minute.   Her final activation was

## 2017-11-29 ENCOUNTER — OFFICE VISIT (OUTPATIENT)
Dept: CARDIOLOGY CLINIC | Age: 67
End: 2017-11-29
Payer: MEDICARE

## 2017-11-29 VITALS
SYSTOLIC BLOOD PRESSURE: 122 MMHG | HEART RATE: 64 BPM | BODY MASS INDEX: 24.77 KG/M2 | WEIGHT: 173 LBS | DIASTOLIC BLOOD PRESSURE: 64 MMHG | HEIGHT: 70 IN

## 2017-11-29 DIAGNOSIS — I72.9 ANEURYSM (HCC): Primary | ICD-10-CM

## 2017-11-29 PROCEDURE — 1090F PRES/ABSN URINE INCON ASSESS: CPT | Performed by: INTERNAL MEDICINE

## 2017-11-29 PROCEDURE — G8484 FLU IMMUNIZE NO ADMIN: HCPCS | Performed by: INTERNAL MEDICINE

## 2017-11-29 PROCEDURE — G8427 DOCREV CUR MEDS BY ELIG CLIN: HCPCS | Performed by: INTERNAL MEDICINE

## 2017-11-29 PROCEDURE — 99213 OFFICE O/P EST LOW 20 MIN: CPT | Performed by: INTERNAL MEDICINE

## 2017-11-29 PROCEDURE — G8420 CALC BMI NORM PARAMETERS: HCPCS | Performed by: INTERNAL MEDICINE

## 2017-11-29 PROCEDURE — G8400 PT W/DXA NO RESULTS DOC: HCPCS | Performed by: INTERNAL MEDICINE

## 2017-11-29 PROCEDURE — 4040F PNEUMOC VAC/ADMIN/RCVD: CPT | Performed by: INTERNAL MEDICINE

## 2017-11-29 PROCEDURE — 1123F ACP DISCUSS/DSCN MKR DOCD: CPT | Performed by: INTERNAL MEDICINE

## 2017-11-29 PROCEDURE — 1036F TOBACCO NON-USER: CPT | Performed by: INTERNAL MEDICINE

## 2017-11-29 PROCEDURE — 3017F COLORECTAL CA SCREEN DOC REV: CPT | Performed by: INTERNAL MEDICINE

## 2017-11-29 PROCEDURE — 3014F SCREEN MAMMO DOC REV: CPT | Performed by: INTERNAL MEDICINE

## 2017-11-29 ASSESSMENT — ENCOUNTER SYMPTOMS
EYE PAIN: 0
COUGH: 0
SHORTNESS OF BREATH: 0
CHEST TIGHTNESS: 0
EYE REDNESS: 0
CONSTIPATION: 0
ABDOMINAL PAIN: 0
DIARRHEA: 0
APNEA: 0
BLOOD IN STOOL: 0
BACK PAIN: 0
ABDOMINAL DISTENTION: 0
NAUSEA: 0
EYE ITCHING: 0
SORE THROAT: 0
SINUS PRESSURE: 0
EYE DISCHARGE: 0

## 2017-11-29 NOTE — PROGRESS NOTES
11/29/17 122/64   09/26/17 (!) 113/48   09/26/17 (!) 119/54       Physical Exam   Constitutional: She is oriented to person, place, and time. She appears well-developed and well-nourished. HENT:   Head: Normocephalic and atraumatic. Eyes: EOM are normal. Pupils are equal, round, and reactive to light. Neck: Normal range of motion. Neck supple. No JVD present. Cardiovascular: Normal rate, regular rhythm, normal heart sounds and intact distal pulses. No murmur heard. Pulmonary/Chest: Effort normal and breath sounds normal. No respiratory distress. She has no wheezes. She has no rales. Abdominal: Soft. Bowel sounds are normal. She exhibits no distension. There is no tenderness. Musculoskeletal: Normal range of motion. She exhibits edema. Neurological: She is alert and oriented to person, place, and time. No cranial nerve deficit. Coordination normal.   Skin: Skin is warm and dry. Psychiatric: She has a normal mood and affect. Nursing note and vitals reviewed.       No results found for: CKTOTAL, CKMB, CKMBINDEX    Lab Results   Component Value Date    WBC 9.6 07/31/2017    RBC 3.60 07/31/2017    HGB 12.0 07/31/2017    HCT 34.9 07/31/2017    MCV 97.1 07/31/2017    MCH 33.2 07/31/2017    MCHC 34.2 07/31/2017    RDW 13.8 07/31/2017     07/31/2017    MPV 8.8 07/31/2017       Lab Results   Component Value Date     07/31/2017    K 3.4 07/31/2017     07/31/2017    CO2 22 07/31/2017    BUN 15 07/31/2017    LABALBU 2.3 07/31/2017    CREATININE 0.7 07/31/2017    CALCIUM 8.0 07/31/2017    LABGLOM 83 07/31/2017    GLUCOSE 130 07/31/2017       Lab Results   Component Value Date    ALKPHOS 96 07/31/2017    ALT 13 07/31/2017    AST 20 07/31/2017    PROT 5.2 07/31/2017    BILITOT 4.4 07/31/2017    BILIDIR 3.5 07/29/2017    LABALBU 2.3 07/31/2017       Lab Results   Component Value Date    MG 1.7 07/31/2017       Lab Results   Component Value Date    INR 1.00 08/12/2015         No results found normal thickness and cuspal   separation. No aortic stenosis is present. No aortic regurgitation is noted. The mitral valve structure was normal with normal leaflet separation. DOPPLER: The transmitral velocity was within the normal range with no   evidence for mitral stenosis. Trace mitral regurgitation is present. Trivial tricuspid regurgitation visualized. Pulmonary systolic pressure At the upper normal limit. and is estimated at   30 mmHg. No evidence of any pericardial effusion. Signature      ----------------------------------------------------------------   Electronically signed by Willie Gloria MD (Interpreting   physician) on 07/26/2017 at 12:49 AM   ----------------------------------------------------------------      Findings      Mitral Valve   The mitral valve structure was normal with normal leaflet separation. DOPPLER: The transmitral velocity was within the normal range with no   evidence for mitral stenosis. Trace mitral regurgitation is present. Aortic Valve   The aortic valve was trileaflet with normal thickness and cuspal   separation. No aortic stenosis is present. No aortic regurgitation is noted. Tricuspid Valve   Trivial tricuspid regurgitation visualized. Pulmonary systolic pressure At the upper normal limit. and is estimated at   30 mmHg. Left Atrium   Left atrial size was normal.      Left Ventricle   Normal left ventricle size and systolic function. Ejection fraction was   estimated at 60 to 65 %. There were no regional left ventricular wall   motion abnormalities and wall thickness was within normal limits. E/A flow reversal noted. Suggestive of diastolic dysfunction. Right Atrium   Right atrial size was normal.      Right Ventricle   The right ventricular size was normal with normal systolic function and   wall thickness. Pericardial Effusion   No evidence of any pericardial effusion.      M-Mode/2D Measurements & [email or web address] [id number]  [id number]        Assessment/Plan   Aflutter - controlled, no symptoms, on BB, taking ASA, Eliquis, no bleeding issues. Continue current care. TAA - stable at 4 cm, repeat CTA in 6 months for surveillance. No associated valvulopathy. Lymphedema - home treatments are improving the swelling, continuing to f/u with clinic, compressive stockings and vein sclerosing are being pursued. RF/Lifestyle discussed.       Disposition:  1 year         Electronically signed by Clair Qiu MD   11/29/2017 at 10:17 AM

## 2017-11-29 NOTE — PROGRESS NOTES
Patient here for a 3 month follow up     Patient denies cp, dizziness, swelling and palpitations     Patient complains of sob on exertion

## 2017-12-01 ENCOUNTER — HOSPITAL ENCOUNTER (OUTPATIENT)
Age: 67
Discharge: HOME | End: 2017-12-01
Payer: MEDICARE

## 2017-12-01 LAB
CALCIUM SERPL-MCNC: 9.6 MG/DL (ref 8.5–10.5)
CREAT SERPL-MCNC: 0.9 MG/DL (ref 0.4–1.2)
GFR SERPL CREATININE-BSD FRML MDRD: 62 ML/MIN/1.73M2
VITAMIN D 25-HYDROXY: 46 NG/ML (ref 30–100)

## 2017-12-01 PROCEDURE — 82310 ASSAY OF CALCIUM: CPT

## 2017-12-01 PROCEDURE — 82565 ASSAY OF CREATININE: CPT

## 2017-12-01 PROCEDURE — 36415 COLL VENOUS BLD VENIPUNCTURE: CPT

## 2017-12-01 PROCEDURE — 82306 VITAMIN D 25 HYDROXY: CPT

## 2017-12-06 ENCOUNTER — HOSPITAL ENCOUNTER (OUTPATIENT)
Dept: MAMMOGRAPHY | Age: 67
Discharge: HOME | End: 2017-12-06
Payer: MEDICARE

## 2017-12-06 DIAGNOSIS — Z12.39 SCREENING FOR BREAST CANCER: ICD-10-CM

## 2017-12-06 PROCEDURE — G0202 SCR MAMMO BI INCL CAD: HCPCS

## 2017-12-07 ENCOUNTER — HOSPITAL ENCOUNTER (OUTPATIENT)
Dept: GENERAL RADIOLOGY | Age: 67
Discharge: HOME | End: 2017-12-07
Payer: MEDICARE

## 2017-12-07 VITALS — HEART RATE: 60 BPM | TEMPERATURE: 97.4 F | SYSTOLIC BLOOD PRESSURE: 110 MMHG | DIASTOLIC BLOOD PRESSURE: 63 MMHG

## 2017-12-07 PROCEDURE — 6360000002 HC RX W HCPCS: Performed by: FAMILY MEDICINE

## 2017-12-07 PROCEDURE — 96365 THER/PROPH/DIAG IV INF INIT: CPT

## 2017-12-07 RX ORDER — ZOLEDRONIC ACID 5 MG/100ML
INJECTION, SOLUTION INTRAVENOUS
Status: DISPENSED
Start: 2017-12-07 | End: 2017-12-07

## 2017-12-07 RX ORDER — ZOLEDRONIC ACID 5 MG/100ML
5 INJECTION, SOLUTION INTRAVENOUS ONCE
Status: COMPLETED | OUTPATIENT
Start: 2017-12-07 | End: 2017-12-07

## 2017-12-07 RX ADMIN — ZOLEDRONIC ACID 5 MG: 5 INJECTION, SOLUTION INTRAVENOUS at 09:50

## 2017-12-07 NOTE — PROGRESS NOTES
Patient tolerated infusion well. Denies pain or discomfort. Discontinued IV per order. Patient discharged in stable condition, no questions or concerns.

## 2018-01-16 ENCOUNTER — TELEPHONE (OUTPATIENT)
Dept: CARDIOLOGY CLINIC | Age: 68
End: 2018-01-16

## 2018-01-16 NOTE — TELEPHONE ENCOUNTER
Pt called concerned due to having anuerysm if Dr. Sravanthi Jj is ok for pt to participate in [address] [place] [address].    Pt states its a low impact exercise class using stretch bands and 1-3 lb wts. Dr. Sravanthi Jj advise?

## 2018-04-09 ENCOUNTER — HOSPITAL ENCOUNTER (OUTPATIENT)
Dept: CT IMAGING | Age: 68
Discharge: HOME | End: 2018-04-09
Payer: MEDICARE

## 2018-04-09 DIAGNOSIS — I72.9 ANEURYSM (HCC): ICD-10-CM

## 2018-04-09 LAB — POC CREATININE WHOLE BLOOD: 1 MG/DL (ref 0.5–1.2)

## 2018-04-09 PROCEDURE — 6360000004 HC RX CONTRAST MEDICATION: Performed by: INTERNAL MEDICINE

## 2018-04-09 PROCEDURE — 82565 ASSAY OF CREATININE: CPT

## 2018-04-09 PROCEDURE — 71275 CT ANGIOGRAPHY CHEST: CPT

## 2018-04-09 RX ADMIN — IOPAMIDOL 95 ML: 755 INJECTION, SOLUTION INTRAVENOUS at 11:02

## 2018-04-10 ENCOUNTER — TELEPHONE (OUTPATIENT)
Dept: CARDIOLOGY CLINIC | Age: 68
End: 2018-04-10

## 2018-10-10 ENCOUNTER — TELEPHONE (OUTPATIENT)
Dept: CARDIOLOGY CLINIC | Age: 68
End: 2018-10-10

## 2018-11-28 ENCOUNTER — OFFICE VISIT (OUTPATIENT)
Dept: CARDIOLOGY CLINIC | Age: 68
End: 2018-11-28
Payer: MEDICARE

## 2018-11-28 VITALS
HEART RATE: 68 BPM | SYSTOLIC BLOOD PRESSURE: 136 MMHG | WEIGHT: 194 LBS | BODY MASS INDEX: 28.73 KG/M2 | DIASTOLIC BLOOD PRESSURE: 66 MMHG | HEIGHT: 69 IN

## 2018-11-28 DIAGNOSIS — I71.21 ASCENDING AORTIC ANEURYSM: Primary | ICD-10-CM

## 2018-11-28 DIAGNOSIS — R07.9 CHEST PAIN, UNSPECIFIED TYPE: ICD-10-CM

## 2018-11-28 PROCEDURE — 3017F COLORECTAL CA SCREEN DOC REV: CPT | Performed by: INTERNAL MEDICINE

## 2018-11-28 PROCEDURE — G8484 FLU IMMUNIZE NO ADMIN: HCPCS | Performed by: INTERNAL MEDICINE

## 2018-11-28 PROCEDURE — G8419 CALC BMI OUT NRM PARAM NOF/U: HCPCS | Performed by: INTERNAL MEDICINE

## 2018-11-28 PROCEDURE — 99213 OFFICE O/P EST LOW 20 MIN: CPT | Performed by: INTERNAL MEDICINE

## 2018-11-28 PROCEDURE — G8400 PT W/DXA NO RESULTS DOC: HCPCS | Performed by: INTERNAL MEDICINE

## 2018-11-28 PROCEDURE — 4040F PNEUMOC VAC/ADMIN/RCVD: CPT | Performed by: INTERNAL MEDICINE

## 2018-11-28 PROCEDURE — 1036F TOBACCO NON-USER: CPT | Performed by: INTERNAL MEDICINE

## 2018-11-28 PROCEDURE — G8427 DOCREV CUR MEDS BY ELIG CLIN: HCPCS | Performed by: INTERNAL MEDICINE

## 2018-11-28 PROCEDURE — 1123F ACP DISCUSS/DSCN MKR DOCD: CPT | Performed by: INTERNAL MEDICINE

## 2018-11-28 PROCEDURE — 1101F PT FALLS ASSESS-DOCD LE1/YR: CPT | Performed by: INTERNAL MEDICINE

## 2018-11-28 PROCEDURE — 1090F PRES/ABSN URINE INCON ASSESS: CPT | Performed by: INTERNAL MEDICINE

## 2018-11-28 RX ORDER — LISINOPRIL 5 MG/1
5 TABLET ORAL DAILY
Qty: 90 TABLET | Refills: 1 | Status: SHIPPED | OUTPATIENT
Start: 2018-11-28 | End: 2019-01-15 | Stop reason: ALTCHOICE

## 2018-11-28 RX ORDER — NITROGLYCERIN 0.4 MG/1
0.4 TABLET SUBLINGUAL EVERY 5 MIN PRN
Qty: 25 TABLET | Refills: 3 | Status: SHIPPED | OUTPATIENT
Start: 2018-11-28

## 2018-12-04 ENCOUNTER — HOSPITAL ENCOUNTER (OUTPATIENT)
Age: 68
Discharge: HOME | End: 2018-12-04
Payer: MEDICARE

## 2018-12-04 DIAGNOSIS — R07.9 CHEST PAIN, UNSPECIFIED TYPE: ICD-10-CM

## 2018-12-04 DIAGNOSIS — I71.21 ASCENDING AORTIC ANEURYSM: ICD-10-CM

## 2018-12-04 LAB
ANION GAP SERPL CALCULATED.3IONS-SCNC: 13 MEQ/L (ref 8–16)
BUN BLDV-MCNC: 23 MG/DL (ref 7–22)
CALCIUM SERPL-MCNC: 9.5 MG/DL (ref 8.5–10.5)
CHLORIDE BLD-SCNC: 104 MEQ/L (ref 98–111)
CO2: 27 MEQ/L (ref 23–33)
CREAT SERPL-MCNC: 0.9 MG/DL (ref 0.4–1.2)
GFR SERPL CREATININE-BSD FRML MDRD: 62 ML/MIN/1.73M2
GLUCOSE BLD-MCNC: 83 MG/DL (ref 70–108)
POTASSIUM SERPL-SCNC: 4.4 MEQ/L (ref 3.5–5.2)
SODIUM BLD-SCNC: 144 MEQ/L (ref 135–145)

## 2018-12-04 PROCEDURE — 80048 BASIC METABOLIC PNL TOTAL CA: CPT

## 2018-12-04 PROCEDURE — 36415 COLL VENOUS BLD VENIPUNCTURE: CPT

## 2018-12-06 ENCOUNTER — HOSPITAL ENCOUNTER (OUTPATIENT)
Dept: NON INVASIVE DIAGNOSTICS | Age: 68
Discharge: HOME | End: 2018-12-06
Payer: MEDICARE

## 2018-12-06 VITALS — WEIGHT: 187 LBS | BODY MASS INDEX: 27.62 KG/M2

## 2018-12-06 DIAGNOSIS — I71.21 ASCENDING AORTIC ANEURYSM: ICD-10-CM

## 2018-12-06 DIAGNOSIS — R07.9 CHEST PAIN, UNSPECIFIED TYPE: ICD-10-CM

## 2018-12-06 PROCEDURE — 2709999900 HC NON-CHARGEABLE SUPPLY

## 2018-12-06 PROCEDURE — 78452 HT MUSCLE IMAGE SPECT MULT: CPT

## 2018-12-06 PROCEDURE — 6360000002 HC RX W HCPCS

## 2018-12-06 PROCEDURE — A9500 TC99M SESTAMIBI: HCPCS | Performed by: INTERNAL MEDICINE

## 2018-12-06 PROCEDURE — 93017 CV STRESS TEST TRACING ONLY: CPT | Performed by: INTERNAL MEDICINE

## 2018-12-06 PROCEDURE — 3430000000 HC RX DIAGNOSTIC RADIOPHARMACEUTICAL: Performed by: INTERNAL MEDICINE

## 2018-12-06 RX ADMIN — Medication 9.9 MILLICURIE: at 10:55

## 2018-12-06 RX ADMIN — Medication 34.6 MILLICURIE: at 11:55

## 2018-12-10 ENCOUNTER — TELEPHONE (OUTPATIENT)
Dept: CARDIOLOGY CLINIC | Age: 68
End: 2018-12-10

## 2018-12-20 ENCOUNTER — TELEPHONE (OUTPATIENT)
Dept: CARDIOLOGY CLINIC | Age: 68
End: 2018-12-20

## 2018-12-20 DIAGNOSIS — I71.21 ASCENDING AORTIC ANEURYSM: Primary | ICD-10-CM

## 2019-01-15 ENCOUNTER — TELEPHONE (OUTPATIENT)
Dept: CARDIOLOGY CLINIC | Age: 69
End: 2019-01-15

## 2019-01-16 ENCOUNTER — HOSPITAL ENCOUNTER (OUTPATIENT)
Dept: MAMMOGRAPHY | Age: 69
Discharge: HOME | End: 2019-01-16
Payer: MEDICARE

## 2019-01-16 DIAGNOSIS — Z12.39 SCREENING BREAST EXAMINATION: ICD-10-CM

## 2019-01-16 PROCEDURE — 77063 BREAST TOMOSYNTHESIS BI: CPT

## 2019-03-07 ENCOUNTER — HOSPITAL ENCOUNTER (OUTPATIENT)
Dept: CT IMAGING | Age: 69
Discharge: HOME | End: 2019-03-07
Payer: MEDICARE

## 2019-03-07 DIAGNOSIS — I71.21 ASCENDING AORTIC ANEURYSM: ICD-10-CM

## 2019-03-07 LAB — POC CREATININE WHOLE BLOOD: 1 MG/DL (ref 0.5–1.2)

## 2019-03-07 PROCEDURE — 6360000004 HC RX CONTRAST MEDICATION: Performed by: INTERNAL MEDICINE

## 2019-03-07 PROCEDURE — 71275 CT ANGIOGRAPHY CHEST: CPT

## 2019-03-07 PROCEDURE — 82565 ASSAY OF CREATININE: CPT

## 2019-03-07 RX ADMIN — IOPAMIDOL 85 ML: 755 INJECTION, SOLUTION INTRAVENOUS at 12:40

## 2019-03-13 ENCOUNTER — OFFICE VISIT (OUTPATIENT)
Dept: CARDIOLOGY CLINIC | Age: 69
End: 2019-03-13
Payer: MEDICARE

## 2019-03-13 VITALS
HEART RATE: 54 BPM | BODY MASS INDEX: 28.03 KG/M2 | WEIGHT: 195.8 LBS | DIASTOLIC BLOOD PRESSURE: 70 MMHG | SYSTOLIC BLOOD PRESSURE: 130 MMHG | HEIGHT: 70 IN

## 2019-03-13 DIAGNOSIS — E78.5 HYPERLIPIDEMIA, UNSPECIFIED HYPERLIPIDEMIA TYPE: ICD-10-CM

## 2019-03-13 DIAGNOSIS — I48.91 ATRIAL FIBRILLATION, UNSPECIFIED TYPE (HCC): ICD-10-CM

## 2019-03-13 DIAGNOSIS — I71.21 ASCENDING AORTIC ANEURYSM: Primary | ICD-10-CM

## 2019-03-13 PROCEDURE — 1090F PRES/ABSN URINE INCON ASSESS: CPT | Performed by: INTERNAL MEDICINE

## 2019-03-13 PROCEDURE — G8419 CALC BMI OUT NRM PARAM NOF/U: HCPCS | Performed by: INTERNAL MEDICINE

## 2019-03-13 PROCEDURE — G8400 PT W/DXA NO RESULTS DOC: HCPCS | Performed by: INTERNAL MEDICINE

## 2019-03-13 PROCEDURE — 3017F COLORECTAL CA SCREEN DOC REV: CPT | Performed by: INTERNAL MEDICINE

## 2019-03-13 PROCEDURE — 1123F ACP DISCUSS/DSCN MKR DOCD: CPT | Performed by: INTERNAL MEDICINE

## 2019-03-13 PROCEDURE — 1036F TOBACCO NON-USER: CPT | Performed by: INTERNAL MEDICINE

## 2019-03-13 PROCEDURE — 93000 ELECTROCARDIOGRAM COMPLETE: CPT | Performed by: INTERNAL MEDICINE

## 2019-03-13 PROCEDURE — G8427 DOCREV CUR MEDS BY ELIG CLIN: HCPCS | Performed by: INTERNAL MEDICINE

## 2019-03-13 PROCEDURE — G8484 FLU IMMUNIZE NO ADMIN: HCPCS | Performed by: INTERNAL MEDICINE

## 2019-03-13 PROCEDURE — 1101F PT FALLS ASSESS-DOCD LE1/YR: CPT | Performed by: INTERNAL MEDICINE

## 2019-03-13 PROCEDURE — 99213 OFFICE O/P EST LOW 20 MIN: CPT | Performed by: INTERNAL MEDICINE

## 2019-03-13 PROCEDURE — 4040F PNEUMOC VAC/ADMIN/RCVD: CPT | Performed by: INTERNAL MEDICINE

## 2019-06-06 ENCOUNTER — TELEPHONE (OUTPATIENT)
Dept: CARDIOLOGY CLINIC | Age: 69
End: 2019-06-06

## 2019-06-06 NOTE — TELEPHONE ENCOUNTER
Pre op Risk Assessment    Procedure COLONOSCOPY  Physician [facility].   Date of surgery/procedure 7-5-19    Last OV 3-13-19  Last Stress 12-6-18  Last Echo 7-25-17  Last Cath NONE IN CHART  Last Stent NONE IN CHART  Is patient on blood thinners ELIQUIS  Hold Meds/how many days 2 DAYS

## 2019-07-08 ENCOUNTER — TELEPHONE (OUTPATIENT)
Dept: CARDIOLOGY CLINIC | Age: 69
End: 2019-07-08

## 2019-07-22 ENCOUNTER — HOSPITAL ENCOUNTER (OUTPATIENT)
Dept: GENERAL RADIOLOGY | Age: 69
Discharge: HOME | End: 2019-07-22
Payer: MEDICARE

## 2019-07-22 ENCOUNTER — HOSPITAL ENCOUNTER (OUTPATIENT)
Age: 69
Discharge: HOME | End: 2019-07-22
Payer: MEDICARE

## 2019-07-22 DIAGNOSIS — M72.2 PLANTAR FASCIITIS OF RIGHT FOOT: ICD-10-CM

## 2019-07-22 PROCEDURE — 73630 X-RAY EXAM OF FOOT: CPT

## 2020-02-10 ENCOUNTER — HOSPITAL ENCOUNTER (OUTPATIENT)
Dept: WOMENS IMAGING | Age: 70
Discharge: HOME | End: 2020-02-10
Payer: MEDICARE

## 2020-02-10 PROCEDURE — 77063 BREAST TOMOSYNTHESIS BI: CPT

## 2020-02-10 PROCEDURE — 77080 DXA BONE DENSITY AXIAL: CPT

## 2020-03-09 ENCOUNTER — HOSPITAL ENCOUNTER (OUTPATIENT)
Dept: CT IMAGING | Age: 70
Discharge: HOME | End: 2020-03-09
Payer: MEDICARE

## 2020-03-09 LAB — POC CREATININE WHOLE BLOOD: 1.1 MG/DL (ref 0.5–1.2)

## 2020-03-09 PROCEDURE — 82565 ASSAY OF CREATININE: CPT

## 2020-03-09 PROCEDURE — 71275 CT ANGIOGRAPHY CHEST: CPT

## 2020-03-09 PROCEDURE — 6360000004 HC RX CONTRAST MEDICATION: Performed by: INTERNAL MEDICINE

## 2020-03-09 RX ADMIN — IOPAMIDOL 85 ML: 755 INJECTION, SOLUTION INTRAVENOUS at 09:11

## 2020-03-15 ENCOUNTER — TELEPHONE (OUTPATIENT)
Dept: CARDIOLOGY CLINIC | Age: 70
End: 2020-03-15

## 2020-03-16 NOTE — TELEPHONE ENCOUNTER
Pt notified       Pt is asking about AAA, not noted in the last CTA   Lm with radiology to see if the radiologist can look at that again

## 2020-03-16 NOTE — TELEPHONE ENCOUNTER
Spoke with CT and they will have the radiologist look at the scan again and do an addedum   Please keep open and let pt know as she is asking if AAA is stable

## 2020-06-23 ENCOUNTER — OFFICE VISIT (OUTPATIENT)
Dept: CARDIOLOGY CLINIC | Age: 70
End: 2020-06-23
Payer: MEDICARE

## 2020-06-23 VITALS
BODY MASS INDEX: 28.86 KG/M2 | HEART RATE: 96 BPM | HEIGHT: 70 IN | WEIGHT: 201.6 LBS | SYSTOLIC BLOOD PRESSURE: 128 MMHG | DIASTOLIC BLOOD PRESSURE: 82 MMHG

## 2020-06-23 PROCEDURE — G8399 PT W/DXA RESULTS DOCUMENT: HCPCS | Performed by: NURSE PRACTITIONER

## 2020-06-23 PROCEDURE — G8427 DOCREV CUR MEDS BY ELIG CLIN: HCPCS | Performed by: NURSE PRACTITIONER

## 2020-06-23 PROCEDURE — 93000 ELECTROCARDIOGRAM COMPLETE: CPT | Performed by: NURSE PRACTITIONER

## 2020-06-23 PROCEDURE — 1123F ACP DISCUSS/DSCN MKR DOCD: CPT | Performed by: NURSE PRACTITIONER

## 2020-06-23 PROCEDURE — G8417 CALC BMI ABV UP PARAM F/U: HCPCS | Performed by: NURSE PRACTITIONER

## 2020-06-23 PROCEDURE — 1090F PRES/ABSN URINE INCON ASSESS: CPT | Performed by: NURSE PRACTITIONER

## 2020-06-23 PROCEDURE — 1036F TOBACCO NON-USER: CPT | Performed by: NURSE PRACTITIONER

## 2020-06-23 PROCEDURE — 3017F COLORECTAL CA SCREEN DOC REV: CPT | Performed by: NURSE PRACTITIONER

## 2020-06-23 PROCEDURE — 99213 OFFICE O/P EST LOW 20 MIN: CPT | Performed by: NURSE PRACTITIONER

## 2020-06-23 PROCEDURE — 4040F PNEUMOC VAC/ADMIN/RCVD: CPT | Performed by: NURSE PRACTITIONER

## 2020-06-23 NOTE — PROGRESS NOTES
deficits  Musculoskeletal:  No obvious deformities    EKG:     Stress Test: 12/6/18  Summary   There were no significant perfusion abnormalities. No evidence of ischemia is noted on this study. No evidence of infarction is noted on this study. Left ventricular systolic function was normal.      Recommendation   Clinical correlation is recommended. Aggressive risk factor management. Signatures      ----------------------------------------------------------------   Electronically signed by Wayne Powell MD     Echo: 7/25/17  Summary   Normal left ventricle size and systolic function. Ejection fraction was   estimated at 60 to 65 %. There were no regional left ventricular wall   motion abnormalities and wall thickness was within normal limits. E/A flow reversal noted. Suggestive of diastolic dysfunction. Left atrial size was normal.   The aortic valve was trileaflet with normal thickness and cuspal   separation. No aortic stenosis is present. No aortic regurgitation is noted. The mitral valve structure was normal with normal leaflet separation. DOPPLER: The transmitral velocity was within the normal range with no   evidence for mitral stenosis. Trace mitral regurgitation is present. Trivial tricuspid regurgitation visualized. Pulmonary systolic pressure At the upper normal limit. and is estimated at   30 mmHg. No evidence of any pericardial effusion. Signature      ----------------------------------------------------------------   Electronically signed by Aren Pike MD      Diagnosis Orders   1. Hyperlipidemia, unspecified hyperlipidemia type  EKG 12 lead   2. PAF (paroxysmal atrial fibrillation) ([facility])  EKG 12 lead   3. Ascending aortic aneurysm ([address]         Orders Placed This Encounter   Procedures    EKG 12 lead     Order Specific Question:   Reason for Exam?     Answer: Other   PAFB/AFL on eliquis, metoprolol  TAA stable, serial CTA  On asa, zocor, no SL NTG use.

## 2020-06-24 ENCOUNTER — TELEPHONE (OUTPATIENT)
Dept: CARDIOLOGY CLINIC | Age: 70
End: 2020-06-24

## 2020-12-21 ENCOUNTER — TELEPHONE (OUTPATIENT)
Dept: CARDIOLOGY CLINIC | Age: 70
End: 2020-12-21

## 2020-12-21 NOTE — TELEPHONE ENCOUNTER
Request for pre op clearance: Dr Asa Cisneros from [address] [place] [address] Rd by: [place]  Date of surgery 2/3/2021  Procedure right total knee  Office phone # [phone] [facility] [phone]  Fax #  [phone]    Is the patient on anti-coag medication?    If yes, how many days does the surgeon want anti-coag medication held:     Date of last visit with cardiologist: 6/23/2020 with Lillian Wagoner

## 2021-01-14 ENCOUNTER — OFFICE VISIT (OUTPATIENT)
Dept: CARDIOLOGY CLINIC | Age: 71
End: 2021-01-14
Payer: MEDICARE

## 2021-01-14 VITALS
HEART RATE: 100 BPM | DIASTOLIC BLOOD PRESSURE: 82 MMHG | BODY MASS INDEX: 29.06 KG/M2 | WEIGHT: 203 LBS | HEIGHT: 70 IN | SYSTOLIC BLOOD PRESSURE: 130 MMHG

## 2021-01-14 DIAGNOSIS — Z01.810 PREOP CARDIOVASCULAR EXAM: Primary | ICD-10-CM

## 2021-01-14 PROCEDURE — G8427 DOCREV CUR MEDS BY ELIG CLIN: HCPCS | Performed by: INTERNAL MEDICINE

## 2021-01-14 PROCEDURE — 4040F PNEUMOC VAC/ADMIN/RCVD: CPT | Performed by: INTERNAL MEDICINE

## 2021-01-14 PROCEDURE — G8484 FLU IMMUNIZE NO ADMIN: HCPCS | Performed by: INTERNAL MEDICINE

## 2021-01-14 PROCEDURE — 1090F PRES/ABSN URINE INCON ASSESS: CPT | Performed by: INTERNAL MEDICINE

## 2021-01-14 PROCEDURE — 99213 OFFICE O/P EST LOW 20 MIN: CPT | Performed by: INTERNAL MEDICINE

## 2021-01-14 PROCEDURE — 1123F ACP DISCUSS/DSCN MKR DOCD: CPT | Performed by: INTERNAL MEDICINE

## 2021-01-14 PROCEDURE — G8417 CALC BMI ABV UP PARAM F/U: HCPCS | Performed by: INTERNAL MEDICINE

## 2021-01-14 PROCEDURE — 1036F TOBACCO NON-USER: CPT | Performed by: INTERNAL MEDICINE

## 2021-01-14 PROCEDURE — G8399 PT W/DXA RESULTS DOCUMENT: HCPCS | Performed by: INTERNAL MEDICINE

## 2021-01-14 PROCEDURE — 3017F COLORECTAL CA SCREEN DOC REV: CPT | Performed by: INTERNAL MEDICINE

## 2021-01-14 NOTE — PROGRESS NOTES
[id number] [facility] [address]  [place] [address]  Dept: [phone]  Dept Fax: [phone]  Loc: [phone]    Visit Date: 1/14/2021    Ms. Josie Ryder is a 79 y.o. female  who presented for:  Chief Complaint   Patient presents with    Cardiac Clearance     right knee surgery with Dr. Xochitl Lopez scheduled for 2-3-21       HPI:   HPI   75 yo F follows up for TAA 4 cm (stable) and Afib/flutter on Eliquis. No chest pain, angina, LONGORIA, orthopnea, PND, sob at rest, palpitations, LE edema, or syncope. No CVA, CHF, MI, DM II, or CKD. Can do FOS without any issues. No significant ECG issues. 2018 Lexiscan without ischemia. Current Outpatient Medications:     Psyllium (METAMUCIL FIBER PO), Take 1 Scoop by mouth daily, Disp: , Rfl:     apixaban (ELIQUIS) 5 MG TABS tablet, Take 1 tablet by mouth 2 times daily, Disp: 180 tablet, Rfl: 2    metoprolol tartrate (LOPRESSOR) 25 MG tablet, TAKE ONE (1) TABLET TWICE A DAY., Disp: 180 tablet, Rfl: 2    aspirin 81 MG tablet, Take 81 mg by mouth daily, Disp: , Rfl:     nitroGLYCERIN (NITROSTAT) 0.4 MG SL tablet, Place 1 tablet under the tongue every 5 minutes as needed for Chest pain, Disp: 25 tablet, Rfl: 3    Calcium Citrate-Vitamin D 500-500 MG-UNIT CHEW, Take 1 tablet by mouth 2 times daily , Disp: , Rfl:     zoledronic acid (RECLAST) 5 MG/100ML SOLN, Infuse 5 mg intravenously once Receives yearly infusion, Disp: , Rfl:     simvastatin (ZOCOR) 40 MG tablet, Take 40 mg by mouth nightly, Disp: , Rfl:     chlordiazePOXIDE-clidinium (LIBRAX) 5-2.5 MG per capsule, Take 1 capsule by mouth daily as needed, Disp: , Rfl:     Past Medical History  Zach Lugo  has a past medical history of Hyperlipidemia, IBS (irritable bowel syndrome), and Senile osteoporosis.     Social History Kemal Cardona  reports that she quit smoking about 43 years ago. She has never used smokeless tobacco. She reports current alcohol use. She reports that she does not use drugs. Family History  Kemal Cardona family history includes Cancer (age of onset: 11) in her brother; Heart Disease in her mother; Stroke in her father and mother. There is no family history of bicuspid aortic valve, aneurysms, heart transplant, pacemakers, defibrillators, or sudden cardiac death. Past Surgical History   Past Surgical History:   Procedure Laterality Date    BACK SURGERY  2015    CATARACT REMOVAL Right 01/2020    CATARACT REMOVAL Left 02/2020    CHOLECYSTECTOMY, LAPAROSCOPIC N/A 7/24/2017    CHOLECYSTECTOMY LAPAROSCOPIC performed by Charla Montelongo MD at [place] [address]  08, 13, 2018    ERCP  7/28/2017    ERCP STENT INSERTION performed by Elenita Valdez MD at [facility]    ERCP Left 7/28/2017    ERCP ENDOSCOPIC RETROGRADE CHOLANGIOPANCREATOGRAPHY performed by Elenita Valdez MD at [facility]    ERCP N/A 9/26/2017    ERCP  performed by Elenita Valdez MD at [address] [place] [address]  2019    banding    HYSTERECTOMY  1984    KNEE ARTHROSCOPY Left 2014    TONSILLECTOMY      as a child    VEIN SURGERY Bilateral 2016    legs       Review of Systems   Constitutional: Negative for chills and fever  HENT: Negative for congestion, sinus pressure, sneezing and sore throat. Eyes: Negative for pain, discharge, redness and itching. Respiratory: Negative for apnea, cough  Gastrointestinal: Negative for blood in stool, constipation, diarrhea   Endocrine: Negative for cold intolerance, heat intolerance, polydipsia. Genitourinary: Negative for dysuria, enuresis, flank pain and hematuria. Musculoskeletal: Negative for arthralgias, joint swelling and neck pain. Neurological: Negative for numbness and headaches. Psychiatric/Behavioral: Negative for agitation, confusion, decreased concentration and dysphoric mood. Objective:     BP (!) 146/82   Pulse 100   Ht 5' 9.5\" (1.765 m)   Wt 203 lb (92.1 kg)   BMI 29.55 kg/m²     Wt Readings from Last 3 Encounters:   01/14/21 203 lb (92.1 kg)   06/23/20 201 lb 9.6 oz (91.4 kg)   03/13/19 195 lb 12.8 oz (88.8 kg)     BP Readings from Last 3 Encounters:   01/14/21 (!) 146/82   06/23/20 128/82   03/13/19 130/70       Nursing note and vitals reviewed. Physical Exam   Constitutional: Oriented to person, place, and time. Appears well-developed and well-nourished. HENT:   Head: Normocephalic and atraumatic. Eyes: EOM are normal. Pupils are equal, round, and reactive to light. Neck: Normal range of motion. Neck supple. No JVD present. Cardiovascular: Normal rate, regular rhythm, normal heart sounds and intact distal pulses. No murmur heard. Pulmonary/Chest: Effort normal and breath sounds normal. No respiratory distress. No wheezes. No rales. Abdominal: Soft. Bowel sounds are normal. No distension. There is no tenderness. Musculoskeletal: Normal range of motion. No edema. Neurological: Alert and oriented to person, place, and time. No cranial nerve deficit. Coordination normal.   Skin: Skin is warm and dry. Psychiatric: Normal mood and affect.        No results found for: CKTOTAL, CKMB, CKMBINDEX    Lab Results   Component Value Date    WBC 9.6 07/31/2017    RBC 3.60 07/31/2017    HGB 12.0 07/31/2017    HCT 34.9 07/31/2017    MCV 97.1 07/31/2017    MCH 33.2 07/31/2017    MCHC 34.2 07/31/2017    RDW 13.8 07/31/2017     07/31/2017    MPV 8.8 07/31/2017       Lab Results   Component Value Date     12/04/2018    K 4.4 12/04/2018     12/04/2018    CO2 27 12/04/2018    BUN 23 12/04/2018    LABALBU 2.3 07/31/2017    CREATININE 0.9 12/04/2018    CALCIUM 9.5 12/04/2018    LABGLOM 62 12/04/2018    GLUCOSE 83 12/04/2018       Lab Results Component Value Date    ALKPHOS 96 07/31/2017    ALT 13 07/31/2017    AST 20 07/31/2017    PROT 5.2 07/31/2017    BILITOT 4.4 07/31/2017    BILIDIR 3.5 07/29/2017    LABALBU 2.3 07/31/2017       Lab Results   Component Value Date    MG 1.7 07/31/2017       Lab Results   Component Value Date    INR 1.00 08/12/2015         No results found for: LABA1C    No results found for: TRIG, HDL, LDLCALC, LDLDIRECT, LABVLDL    Lab Results   Component Value Date    TSH 3.900 07/25/2017         Testing Reviewed:      I have individually reviewed the cardiac test below:    ECHO:   Results for orders placed during the hospital encounter of 07/24/17   ECHO Complete 2D W Doppler W Color    Narrative Transthoracic Echocardiography Report (TTE)     Demographics      Patient Name    Jenna Muro Gender               Female      MR #            [id number]     Race                                                     Ethnicity      Account #       [de-identified]     Room Number          7409      Accession       [id number]     Date of Study        07/25/2017   Number      Date of Birth   1950    Referring Physician  Matty Bowen MD      Age             79 year(s)    Elana Hunter MD                                 Physician     Procedure    Type of Study      TTE procedure:ECHOCARDIOGRAM COMPLETE 2D W DOPPLER W COLOR. Procedure Date  Date: 07/25/2017 Start: 04:25 PM    Study Location: Bedside  Technical Quality: Adequate visualization    Indications:Paroxsysmal Supraventricular Tachycardia (PSVT).     Additional Medical History:Hyperlipidemia    Patient Status: Routine    Height: 68.9 inches Weight: 191.8 pounds BSA: 2.03 m^2 BMI: 28.41 kg/m^2    BP: 137/69 mmHg     Conclusions      Summary Normal left ventricle size and systolic function. Ejection fraction was   estimated at 60 to 65 %. There were no regional left ventricular wall   motion abnormalities and wall thickness was within normal limits. E/A flow reversal noted. Suggestive of diastolic dysfunction. Left atrial size was normal.   The aortic valve was trileaflet with normal thickness and cuspal   separation. No aortic stenosis is present. No aortic regurgitation is noted. The mitral valve structure was normal with normal leaflet separation. DOPPLER: The transmitral velocity was within the normal range with no   evidence for mitral stenosis. Trace mitral regurgitation is present. Trivial tricuspid regurgitation visualized. Pulmonary systolic pressure At the upper normal limit. and is estimated at   30 mmHg. No evidence of any pericardial effusion. Signature      ----------------------------------------------------------------   Electronically signed by Yoli Ken MD (Interpreting   physician) on 07/26/2017 at 12:49 AM   ----------------------------------------------------------------      Findings      Mitral Valve   The mitral valve structure was normal with normal leaflet separation. DOPPLER: The transmitral velocity was within the normal range with no   evidence for mitral stenosis. Trace mitral regurgitation is present. Aortic Valve   The aortic valve was trileaflet with normal thickness and cuspal   separation. No aortic stenosis is present. No aortic regurgitation is noted. Tricuspid Valve   Trivial tricuspid regurgitation visualized. Pulmonary systolic pressure At the upper normal limit. and is estimated at   30 mmHg. Left Atrium   Left atrial size was normal.      Left Ventricle   Normal left ventricle size and systolic function. Ejection fraction was   estimated at 60 to 65 %.  There were no regional left ventricular wall Velocity: 6.63 cm/s                          PV Peak Gradient: 1.88 mmHg   MV A' Septal   Velocity: 10.6 cm/s AV DVI (VTI): 0.56AV DVI   MV E' Lateral       (Vmax):0.62   Velocity: 8.19 cm/s   MV A' Lateral   Velocity: 8.19 cm/s   E/E' septal: 11.84   E/E' lateral: 9.58   MR Velocity: 381   cm/s     [email or web address] [id number]        Assessment/Plan   Pre-operative CV exam  Possible upcoming Orthopedic surgery (Intermediate risk surgery)  Low to intermediate risk patient    No active cardiac complaints. No cardiac issues on exams. Able to do > 4 METS. Patient accepts the risk of the planned procedure and understands the possibility of dwayne-operative cardiac event, including but not limited to MI, VT/VF, cardiac arrest, and death, and wishes to proceed with the procedure. Hold Eliquis 48 hours prior to the surgery and restart afterwards. No further cardiac testing prior to upcoming surgery.     Disposition:  1 year         Electronically signed by Harpreet Morales MD   1/14/2021 at 4:05 PM EST

## 2021-02-10 ENCOUNTER — TELEPHONE (OUTPATIENT)
Dept: CARDIOLOGY CLINIC | Age: 71
End: 2021-02-10

## 2021-02-10 NOTE — TELEPHONE ENCOUNTER
Pt called in states Dr Andi Juan did knee sx, he has pt be off eliquis from 4 days prior to sx, and wants her to only take a 1/2 tab bid from 2-3-2021 to 2-17. And no baby asa until the 12th. Pt is concerned about this. Recommendations. You cleared pt for this but only gave him the ok to be off of eliquis for 2 days.

## 2021-02-11 NOTE — TELEPHONE ENCOUNTER
Still recommend 2 days  If she wants to follow Michael Harris that is fine, but I was trying to minimize time off [address] [place] [address] Road

## 2021-02-18 ENCOUNTER — HOSPITAL ENCOUNTER (OUTPATIENT)
Dept: INTERVENTIONAL RADIOLOGY/VASCULAR | Age: 71
Discharge: HOME | End: 2021-02-18
Payer: MEDICARE

## 2021-02-18 DIAGNOSIS — M17.11 PRIMARY OSTEOARTHRITIS OF RIGHT KNEE: ICD-10-CM

## 2021-02-18 DIAGNOSIS — M79.89 SWELLING OF LIMB: ICD-10-CM

## 2021-02-18 DIAGNOSIS — M79.661 PAIN OF RIGHT LOWER LEG: ICD-10-CM

## 2021-02-18 PROCEDURE — 93971 EXTREMITY STUDY: CPT

## 2021-02-25 ENCOUNTER — TELEPHONE (OUTPATIENT)
Dept: CARDIOLOGY CLINIC | Age: 71
End: 2021-02-25

## 2021-02-25 DIAGNOSIS — I71.21 ASCENDING AORTIC ANEURYSM: Primary | ICD-10-CM

## 2021-02-25 NOTE — TELEPHONE ENCOUNTER
Pt called asking if she should have a CTA since her's was a yr ago. Aneurysm was 4.2 on 3-2020.     Please advise

## 2021-03-01 ENCOUNTER — HOSPITAL ENCOUNTER (OUTPATIENT)
Age: 71
Discharge: HOME | End: 2021-03-01
Payer: MEDICARE

## 2021-03-01 ENCOUNTER — HOSPITAL ENCOUNTER (OUTPATIENT)
Dept: GENERAL RADIOLOGY | Age: 71
Discharge: HOME | End: 2021-03-01
Payer: MEDICARE

## 2021-03-01 DIAGNOSIS — M54.50 ACUTE LOW BACK PAIN WITHOUT SCIATICA, UNSPECIFIED BACK PAIN LATERALITY: ICD-10-CM

## 2021-03-01 PROCEDURE — 72100 X-RAY EXAM L-S SPINE 2/3 VWS: CPT

## 2021-03-16 ENCOUNTER — HOSPITAL ENCOUNTER (OUTPATIENT)
Age: 71
Discharge: HOME | End: 2021-03-16
Payer: MEDICARE

## 2021-03-16 PROCEDURE — 82565 ASSAY OF CREATININE: CPT

## 2021-03-16 PROCEDURE — 82306 VITAMIN D 25 HYDROXY: CPT

## 2021-03-16 PROCEDURE — 84520 ASSAY OF UREA NITROGEN: CPT

## 2021-03-16 PROCEDURE — 82310 ASSAY OF CALCIUM: CPT

## 2021-03-16 PROCEDURE — 36415 COLL VENOUS BLD VENIPUNCTURE: CPT

## 2021-03-16 PROCEDURE — 84100 ASSAY OF PHOSPHORUS: CPT

## 2021-03-17 LAB
BUN BLDV-MCNC: 19 MG/DL (ref 7–22)
CALCIUM SERPL-MCNC: 9.6 MG/DL (ref 8.5–10.5)
CREAT SERPL-MCNC: 1.4 MG/DL (ref 0.4–1.2)
GFR SERPL CREATININE-BSD FRML MDRD: 37 ML/MIN/1.73M2
PHOSPHORUS: 3.3 MG/DL (ref 2.4–4.7)
VITAMIN D 25-HYDROXY: 73 NG/ML (ref 30–100)

## 2021-03-18 ENCOUNTER — HOSPITAL ENCOUNTER (OUTPATIENT)
Dept: CT IMAGING | Age: 71
Discharge: HOME | End: 2021-03-18
Payer: MEDICARE

## 2021-03-18 DIAGNOSIS — I71.21 ASCENDING AORTIC ANEURYSM: ICD-10-CM

## 2021-03-18 PROCEDURE — 71275 CT ANGIOGRAPHY CHEST: CPT

## 2021-03-18 PROCEDURE — 6360000004 HC RX CONTRAST MEDICATION: Performed by: INTERNAL MEDICINE

## 2021-03-18 RX ADMIN — IOPAMIDOL 85 ML: 755 INJECTION, SOLUTION INTRAVENOUS at 08:40

## 2021-03-30 ENCOUNTER — HOSPITAL ENCOUNTER (OUTPATIENT)
Age: 71
Discharge: HOME | End: 2021-03-30
Payer: MEDICARE

## 2021-03-30 LAB — C-REACTIVE PROTEIN: < 0.3 MG/DL (ref 0–1)

## 2021-03-30 PROCEDURE — 85651 RBC SED RATE NONAUTOMATED: CPT

## 2021-03-30 PROCEDURE — 86140 C-REACTIVE PROTEIN: CPT

## 2021-03-30 PROCEDURE — 85025 COMPLETE CBC W/AUTO DIFF WBC: CPT

## 2021-03-30 PROCEDURE — 36415 COLL VENOUS BLD VENIPUNCTURE: CPT

## 2021-03-31 LAB
BASOPHILS # BLD: 1.4 %
BASOPHILS ABSOLUTE: 0.1 THOU/MM3 (ref 0–0.1)
EOSINOPHIL # BLD: 4 %
EOSINOPHILS ABSOLUTE: 0.2 THOU/MM3 (ref 0–0.4)
ERYTHROCYTE [DISTWIDTH] IN BLOOD BY AUTOMATED COUNT: 13.6 % (ref 11.5–14.5)
ERYTHROCYTE [DISTWIDTH] IN BLOOD BY AUTOMATED COUNT: 51.9 FL (ref 35–45)
HCT VFR BLD CALC: 44 % (ref 37–47)
HEMOGLOBIN: 13.7 GM/DL (ref 12–16)
IMMATURE GRANS (ABS): 0.02 THOU/MM3 (ref 0–0.07)
IMMATURE GRANULOCYTES: 0.5 %
LYMPHOCYTES # BLD: 34.5 %
LYMPHOCYTES ABSOLUTE: 1.5 THOU/MM3 (ref 1–4.8)
MCH RBC QN AUTO: 32.1 PG (ref 26–33)
MCHC RBC AUTO-ENTMCNC: 31.1 GM/DL (ref 32.2–35.5)
MCV RBC AUTO: 103 FL (ref 81–99)
MONOCYTES # BLD: 5.2 %
MONOCYTES ABSOLUTE: 0.2 THOU/MM3 (ref 0.4–1.3)
NUCLEATED RED BLOOD CELLS: 0 /100 WBC
PLATELET # BLD: 219 THOU/MM3 (ref 130–400)
PMV BLD AUTO: 10.7 FL (ref 9.4–12.4)
RBC # BLD: 4.27 MILL/MM3 (ref 4.2–5.4)
SEDIMENTATION RATE, ERYTHROCYTE: 13 MM/HR (ref 0–20)
SEG NEUTROPHILS: 54.4 %
SEGMENTED NEUTROPHILS ABSOLUTE COUNT: 2.3 THOU/MM3 (ref 1.8–7.7)
WBC # BLD: 4.3 THOU/MM3 (ref 4.8–10.8)

## 2021-04-05 ENCOUNTER — HOSPITAL ENCOUNTER (OUTPATIENT)
Dept: NUCLEAR MEDICINE | Age: 71
Discharge: HOME | End: 2021-04-05
Payer: MEDICARE

## 2021-04-05 DIAGNOSIS — M46.26 INFECTION OF LUMBAR SPINE (HCC): ICD-10-CM

## 2021-04-05 PROCEDURE — 78315 BONE IMAGING 3 PHASE: CPT

## 2021-04-05 PROCEDURE — A9503 TC99M MEDRONATE: HCPCS | Performed by: PHYSICIAN ASSISTANT

## 2021-04-05 PROCEDURE — 3430000000 HC RX DIAGNOSTIC RADIOPHARMACEUTICAL: Performed by: PHYSICIAN ASSISTANT

## 2021-04-05 RX ORDER — TC 99M MEDRONATE 20 MG/10ML
26.2 INJECTION, POWDER, LYOPHILIZED, FOR SOLUTION INTRAVENOUS
Status: COMPLETED | OUTPATIENT
Start: 2021-04-05 | End: 2021-04-05

## 2021-04-05 RX ADMIN — TC 99M MEDRONATE 26.2 MILLICURIE: 20 INJECTION, POWDER, LYOPHILIZED, FOR SOLUTION INTRAVENOUS at 07:15

## 2021-04-07 ENCOUNTER — TELEPHONE (OUTPATIENT)
Dept: WOUND CARE | Age: 71
End: 2021-04-07

## 2021-04-09 ENCOUNTER — TELEPHONE (OUTPATIENT)
Dept: WOUND CARE | Age: 71
End: 2021-04-09

## 2021-04-09 ENCOUNTER — HOSPITAL ENCOUNTER (OUTPATIENT)
Dept: MRI IMAGING | Age: 71
Discharge: HOME | End: 2021-04-09

## 2021-04-09 DIAGNOSIS — Z00.6 EXAMINATION FOR NORMAL COMPARISON FOR CLINICAL RESEARCH: ICD-10-CM

## 2021-04-09 NOTE — TELEPHONE ENCOUNTER
Second VM left at [facility] to clarify whether or not patient has had aspiration done or if she is scheduled for procedure. Will schedule patient once we receive clarification from his office.

## 2021-04-12 ENCOUNTER — HOSPITAL ENCOUNTER (OUTPATIENT)
Dept: NUCLEAR MEDICINE | Age: 71
Discharge: HOME | End: 2021-04-12
Payer: MEDICARE

## 2021-04-12 ENCOUNTER — APPOINTMENT (OUTPATIENT)
Dept: NUCLEAR MEDICINE | Age: 71
End: 2021-04-12
Payer: MEDICARE

## 2021-04-12 DIAGNOSIS — M46.26 INFECTION OF LUMBAR SPINE (HCC): ICD-10-CM

## 2021-04-12 PROCEDURE — 3430000000 HC RX DIAGNOSTIC RADIOPHARMACEUTICAL: Performed by: PHYSICIAN ASSISTANT

## 2021-04-12 PROCEDURE — 78802 RP LOCLZJ TUM WHBDY 1 D IMG: CPT

## 2021-04-12 PROCEDURE — A9570 INDIUM IN-111 AUTO WBC: HCPCS | Performed by: PHYSICIAN ASSISTANT

## 2021-04-12 RX ADMIN — Medication 359 MICRO CURIE: at 11:05

## 2021-04-13 ENCOUNTER — HOSPITAL ENCOUNTER (OUTPATIENT)
Dept: NUCLEAR MEDICINE | Age: 71
Discharge: HOME | End: 2021-04-13
Payer: MEDICARE

## 2021-04-16 RX ORDER — SODIUM CHLORIDE 450 MG/100ML
INJECTION, SOLUTION INTRAVENOUS CONTINUOUS
Status: CANCELLED | OUTPATIENT
Start: 2021-04-16

## 2021-04-28 ENCOUNTER — HOSPITAL ENCOUNTER (OUTPATIENT)
Dept: MAMMOGRAPHY | Age: 71
Discharge: HOME | End: 2021-04-28
Payer: MEDICARE

## 2021-04-28 ENCOUNTER — HOSPITAL ENCOUNTER (OUTPATIENT)
Dept: WOUND CARE | Age: 71
Discharge: HOME | End: 2021-04-28
Payer: MEDICARE

## 2021-04-28 VITALS
OXYGEN SATURATION: 98 % | RESPIRATION RATE: 16 BRPM | TEMPERATURE: 96.7 F | HEART RATE: 77 BPM | SYSTOLIC BLOOD PRESSURE: 132 MMHG | DIASTOLIC BLOOD PRESSURE: 72 MMHG

## 2021-04-28 DIAGNOSIS — Z12.31 VISIT FOR SCREENING MAMMOGRAM: ICD-10-CM

## 2021-04-28 LAB
BASOPHILS # BLD: 1 %
BASOPHILS ABSOLUTE: 0 THOU/MM3 (ref 0–0.1)
EOSINOPHIL # BLD: 5.9 %
EOSINOPHILS ABSOLUTE: 0.2 THOU/MM3 (ref 0–0.4)
ERYTHROCYTE [DISTWIDTH] IN BLOOD BY AUTOMATED COUNT: 14 % (ref 11.5–14.5)
ERYTHROCYTE [DISTWIDTH] IN BLOOD BY AUTOMATED COUNT: 52.4 FL (ref 35–45)
HCT VFR BLD CALC: 42.7 % (ref 37–47)
HEMOGLOBIN: 13.4 GM/DL (ref 12–16)
IMMATURE GRANS (ABS): 0.01 THOU/MM3 (ref 0–0.07)
IMMATURE GRANULOCYTES: 0.2 %
LYMPHOCYTES # BLD: 32.7 %
LYMPHOCYTES ABSOLUTE: 1.3 THOU/MM3 (ref 1–4.8)
MCH RBC QN AUTO: 32 PG (ref 26–33)
MCHC RBC AUTO-ENTMCNC: 31.4 GM/DL (ref 32.2–35.5)
MCV RBC AUTO: 101.9 FL (ref 81–99)
MONOCYTES # BLD: 9.2 %
MONOCYTES ABSOLUTE: 0.4 THOU/MM3 (ref 0.4–1.3)
NUCLEATED RED BLOOD CELLS: 0 /100 WBC
PLATELET # BLD: 174 THOU/MM3 (ref 130–400)
PMV BLD AUTO: 10.4 FL (ref 9.4–12.4)
RBC # BLD: 4.19 MILL/MM3 (ref 4.2–5.4)
SEG NEUTROPHILS: 51 %
SEGMENTED NEUTROPHILS ABSOLUTE COUNT: 2 THOU/MM3 (ref 1.8–7.7)
WBC # BLD: 4 THOU/MM3 (ref 4.8–10.8)

## 2021-04-28 PROCEDURE — 86140 C-REACTIVE PROTEIN: CPT

## 2021-04-28 PROCEDURE — 85025 COMPLETE CBC W/AUTO DIFF WBC: CPT

## 2021-04-28 PROCEDURE — 99211 OFF/OP EST MAY X REQ PHY/QHP: CPT

## 2021-04-28 PROCEDURE — 85651 RBC SED RATE NONAUTOMATED: CPT

## 2021-04-28 PROCEDURE — 36415 COLL VENOUS BLD VENIPUNCTURE: CPT

## 2021-04-28 PROCEDURE — 77067 SCR MAMMO BI INCL CAD: CPT

## 2021-04-28 RX ORDER — DENOSUMAB 60 MG/ML
60 INJECTION SUBCUTANEOUS ONCE
COMMUNITY

## 2021-04-28 NOTE — PLAN OF CARE
Problem: Skin Integrity:  Goal: Demonstration of wound healing without infection will improve  Description: Demonstration of wound healing without infection will improve  4/28/2021 1454 by Poly Hickey RN  Outcome: Met This Shift  Note: Patient seen for possible concern for back infection. Patient getting labs drawn today or tomorrow, and has an appointment with Dr. Dann Gómez next week. Labs already drawn ok. No need for follow up unless patient or Dr. Dann Gómez has any further concerns. See AVS for discharge instructions. 4/28/2021 1153 by Poly Hickey RN  Outcome: Ongoing  4/28/2021 1107 by Poly Hickey RN  Outcome: Met This Shift  Note: Seen today for possible back infection, blood work per dr Celeste Landa. No need to follow up in wound clinic at this time. Will monitor labs    Care plan reviewed with patient. Patient verbalize understanding of the plan of care and contribute to goal setting.
Self/Family member

## 2021-04-28 NOTE — CONSULTS
[facility]         Consult and Procedure Note      Randy Edwards  MEDICAL RECORD NUMBER:  [id number]  AGE: 70 y.o. GENDER: female  : 1950  EPISODE DATE:  2021    Subjective:     Chief Complaint   Patient presents with    Wound Check         HISTORY OF PRESENT ILLNESS   She is a 28-year-old female patient referred to the wound clinic due to abnormal MRI she has history of degenerative back disease had history of fractures with kyphoplasty. She was seen by Wesley Bliss. There was concern for discitis however her markers of inflammation were all normal we feel that this is all related to degenerative disease and decided to follow her she denies any fever or chills she had no any hardware in her spine no bowel or stool incontinence she has osteoporosis/osteopenia she does not smoke or drink alcohol.     PAST MEDICAL HISTORY                 Diagnosis Date    Hyperlipidemia     IBS (irritable bowel syndrome)     Senile osteoporosis      PAST SURGICAL HISTORY     PAST SURGICAL HISTORY    Past Surgical History:   Procedure Laterality Date    BACK SURGERY      CATARACT REMOVAL Right 2020    CATARACT REMOVAL Left 2020    CHOLECYSTECTOMY, LAPAROSCOPIC N/A 2017    CHOLECYSTECTOMY LAPAROSCOPIC performed by Colin Burnett MD at [address] [place] [address]  2018    ERCP  2017    ERCP STENT INSERTION performed by Katie Meza MD at [facility]    ERCP Left 2017    ERCP ENDOSCOPIC RETROGRADE CHOLANGIOPANCREATOGRAPHY performed by Katie Meza MD at [facility]    ERCP N/A 2017    ERCP  performed by Katie Meza MD at [address] [facility]  2019    banding    HYSTERECTOMY  1984    KNEE ARTHROSCOPY Left 2014    TONSILLECTOMY      as a child    VEIN SURGERY Bilateral 2016    legs     FAMILY HISTORY         Family History   Problem Relation Age of Onset    Stroke Mother     Heart Disease Mother     Stroke Father    [facility] Cancer Brother 5        leukemia       SOCIAL HISTORY       Social History     Tobacco Use    Smoking status: Former Smoker     Quit date: 1978     Years since quittin.3    Smokeless tobacco: Never Used   Substance Use Topics    Alcohol use: Yes     Comment: rarely    Drug use: No       ALLERGIES       Allergies   Allergen Reactions    Amitiza [Lubiprostone] Diarrhea    Bentyl [Dicyclomine Hcl]      Severe spasms    Lipitor [Atorvastatin]      Muscle aches    Soma [Carisoprodol] Nausea And Vomiting         MEDICATIONS       Current Outpatient Medications on File Prior to Encounter   Medication Sig Dispense Refill    denosumab (PROLIA) 60 MG/ML SOSY SC injection Inject 60 mg into the skin once 2x year      apixaban (ELIQUIS) 5 MG TABS tablet Take 1 tablet by mouth 2 times daily 180 tablet 2    metoprolol tartrate (LOPRESSOR) 25 MG tablet TAKE ONE (1) TABLET TWICE A DAY. 180 tablet 2    Psyllium (METAMUCIL FIBER PO) Take 1 Scoop by mouth daily      aspirin 81 MG tablet Take 81 mg by mouth daily      Calcium Citrate-Vitamin D 500-500 MG-UNIT CHEW Take 1 tablet by mouth 2 times daily       simvastatin (ZOCOR) 40 MG tablet Take 40 mg by mouth nightly      nitroGLYCERIN (NITROSTAT) 0.4 MG SL tablet Place 1 tablet under the tongue every 5 minutes as needed for Chest pain 25 tablet 3    chlordiazePOXIDE-clidinium (LIBRAX) 5-2.5 MG per capsule Take 1 capsule by mouth daily as needed       No current facility-administered medications on file prior to encounter. REVIEW OF SYSTEMS:     Constitutional: no fever, no night sweats, no fatigue. Head: no head ache , no head injury, no migranes. Eye: no blurring of vision, no double vision.   Ears: no hearing difficulty, no tinnitus  Mouth/throat: no ulceration, dental caries , dysphagia  Lungs: no cough, no shortness of breath, no wheeze  CVS: no palpitation, no chest pain, no shortness of breath  GI: no abdominal pain, no nausea , no vomiting, no constipation  NII: no dysuria, frequency and urgency, no hematuria, no kidney stones  Musculoskeletal: Chronic back pain  Endocrine: no polyuria, polydipsia, no cold or heat intolerance  Hematology: no anemia, no easy brusing or bleeding, no hx of clotting disorder  Dermatology: no skin rash, no eczema, no pruritis,  Psychiatry: no depression, no anxiety,no panic attacks, no suicide ideation        PHYSICAL EXAM:     /72   Pulse 77   Temp 96.7 °F (35.9 °C) (Infrared)   Resp 16   SpO2 98%   Wt Readings from Last 3 Encounters:   01/14/21 203 lb (92.1 kg)   06/23/20 201 lb 9.6 oz (91.4 kg)   03/13/19 195 lb 12.8 oz (88.8 kg)     General:  Awake, alert, not in distress. HEENT: pink conjunctiva, unicteric sclera, moist oral mucosa. Chest: Bilateral air entry  Cardiovascular:  RRR ,S1S2, no murmur or gallop. Abdomen:  Soft, non tender to palpation. Extremities: She has kyphoscoliosis  Skin:  Warm and dry. CNS: Oriented. Assessment:   Degenerative back disease with history of osteoporosis with fractures  Markers of inflammation are normal.  We will continue to monitor patient. At this time I do not feel is related to infection  Patient Active Problem List   Diagnosis Code    Osteoporosis I18.1    Biliary colic K36.37    Narrow complex tachycardia (HCC) I47.1    IBS (irritable bowel syndrome) K58.9    Hyperlipidemia E78.5    S/P laparoscopic cholecystectomy Z90.49    Ascending aortic aneurysm, 4 cm per CT-chest from 7/25 I71.2    Abnormal EKG - ST depression; cardio recommends outpatient cardiac cath R94.31    Postoperative ileus ([address] K91.89, K56.7       PLAN     Patient examined and evaluated    Please see attached Discharge Instructions    Written patient dismissal instructions given to patient and signed by patient or POA.              Electronically signed by Nica Andersen MD on 4/28/2021 at 9:26 AM

## 2021-04-29 LAB
C-REACTIVE PROTEIN: < 0.3 MG/DL (ref 0–1)
SEDIMENTATION RATE, ERYTHROCYTE: 40 MM/HR (ref 0–20)

## 2021-12-15 RX ORDER — APIXABAN 5 MG/1
TABLET, FILM COATED ORAL
Qty: 180 TABLET | Refills: 0 | Status: SHIPPED | OUTPATIENT
Start: 2021-12-15 | End: 2022-03-03 | Stop reason: SDUPTHER

## 2022-01-31 ENCOUNTER — TELEPHONE (OUTPATIENT)
Dept: CARDIOLOGY CLINIC | Age: 72
End: 2022-01-31

## 2022-01-31 NOTE — TELEPHONE ENCOUNTER
Reece Brennan is calling to schedule a yearly appt with Dr Duran Jones, was last seen in , pt would like to be seen in March. Please call Reece Brennan to get an appt scheduled.

## 2022-03-02 PROBLEM — I48.0 PAF (PAROXYSMAL ATRIAL FIBRILLATION) (HCC): Status: ACTIVE | Noted: 2022-03-02

## 2022-03-02 NOTE — PROGRESS NOTES
Calcium Citrate-Vitamin D 500-500 MG-UNIT CHEW Take 1 tablet by mouth 2 times daily       chlordiazePOXIDE-clidinium (LIBRAX) 5-2.5 MG per capsule Take 1 capsule by mouth daily as needed       No current facility-administered medications for this visit. Social History     Socioeconomic History    Marital status:      Spouse name: None    Number of children: None    Years of education: None    Highest education level: None   Occupational History    None   Tobacco Use    Smoking status: Former Smoker     Quit date: 1978     Years since quittin.1    Smokeless tobacco: Never Used   Vaping Use    Vaping Use: None   Substance and Sexual Activity    Alcohol use: Yes     Comment: rarely    Drug use: No    Sexual activity: None   Other Topics Concern    None   Social History Narrative    None     Social Determinants of Health     Financial Resource Strain:     Difficulty of Paying Living Expenses: Not on file   Food Insecurity:     Worried About Running Out of Food in the Last Year: Not on file    [place] of Food in the Last Year: Not on file   Transportation Needs:     Lack of Transportation (Medical): Not on file    Lack of Transportation (Non-Medical):  Not on file   Physical Activity:     Days of Exercise per Week: Not on file    Minutes of Exercise per Session: Not on file   Stress:     Feeling of Stress : Not on file   Social Connections:     Frequency of Communication with Friends and Family: Not on file    Frequency of Social Gatherings with Friends and Family: Not on file    Attends [employer] Services: Not on file    Active Member of Clubs or Organizations: Not on file    Attends Club or Organization Meetings: Not on file    Marital Status: Not on file   Intimate Partner Violence:     Fear of Current or Ex-Partner: Not on file    Emotionally Abused: Not on file    Physically Abused: Not on file    Sexually Abused: Not on file   Housing Stability:     Unable to Pay for Housing in the Last Year: Not on file    Number of Places Lived in the Last Year: Not on file    Unstable Housing in the Last Year: Not on file       Family History   Problem Relation Age of Onset    Stroke Mother     Heart Disease Mother     Stroke Father     Cancer Brother 5        leukemia    Breast Cancer Daughter        Blood pressure 113/69, pulse 100, height 5' 8.5\" (1.74 m), weight 208 lb 9.6 oz (94.6 kg). General:   Well developed, well nourished  Lungs:   Clear to auscultation  Heart:    Normal S1 S2, No murmur, rubs, or gallops  Abdomen:   Soft, non tender, no organomegalies, positive bowel sounds  Extremities:   No edema, no cyanosis, good peripheral pulses, lymphedema  Neurological:   Awake, alert, oriented. No obvious focal deficits  Musculoskeletal:  No obvious deformities    EKG:          Diagnosis Orders   1. Ascending aortic aneurysm, 4 cm per CT-chest from 7/25  EKG 12 lead    CTA CHEST W WO CONTRAST   2. PAF (paroxysmal atrial fibrillation) (HCC)  EKG 12 lead   3. Familial hypercholesterolemia         Orders Placed This Encounter   Procedures    CTA CHEST W WO CONTRAST     Standing Status:   Future     Standing Expiration Date:   3/3/2023     Order Specific Question:   STAT Creatinine as needed:     Answer: Yes    EKG 12 lead     Order Specific Question:   Reason for Exam?     Answer: Other       Assessment/Plan:   PAF-no issues with this recently. Tolerating lopressor, eliquis well. TAA- repeat yearly CTA. Has been stable last few years. BP is good. HLD-LDL was 130 last year. Change zocor to crestor 20 mg daily. Did not tolerate lipitor d/t muscle aches. If crestor also not tolerated, may be a candidate for PCSK9 inhibitor. CTA to check status of TAA. Disposition:   F/u with Dr Preeti Villegas in 1 year.    Continue Dr Yaakov Hendrickson current treatment plan  Follow up with Dr Preeti Villegas as scheduled or sooner if needed

## 2022-03-03 ENCOUNTER — OFFICE VISIT (OUTPATIENT)
Dept: CARDIOLOGY CLINIC | Age: 72
End: 2022-03-03
Payer: MEDICARE

## 2022-03-03 VITALS
WEIGHT: 208.6 LBS | SYSTOLIC BLOOD PRESSURE: 113 MMHG | HEART RATE: 100 BPM | DIASTOLIC BLOOD PRESSURE: 69 MMHG | BODY MASS INDEX: 30.9 KG/M2 | HEIGHT: 69 IN

## 2022-03-03 DIAGNOSIS — I71.21 ASCENDING AORTIC ANEURYSM: Primary | ICD-10-CM

## 2022-03-03 DIAGNOSIS — I48.0 PAF (PAROXYSMAL ATRIAL FIBRILLATION) (HCC): ICD-10-CM

## 2022-03-03 DIAGNOSIS — E78.01 FAMILIAL HYPERCHOLESTEROLEMIA: ICD-10-CM

## 2022-03-03 PROCEDURE — 99214 OFFICE O/P EST MOD 30 MIN: CPT | Performed by: STUDENT IN AN ORGANIZED HEALTH CARE EDUCATION/TRAINING PROGRAM

## 2022-03-03 PROCEDURE — G8399 PT W/DXA RESULTS DOCUMENT: HCPCS | Performed by: STUDENT IN AN ORGANIZED HEALTH CARE EDUCATION/TRAINING PROGRAM

## 2022-03-03 PROCEDURE — G8484 FLU IMMUNIZE NO ADMIN: HCPCS | Performed by: STUDENT IN AN ORGANIZED HEALTH CARE EDUCATION/TRAINING PROGRAM

## 2022-03-03 PROCEDURE — 1036F TOBACCO NON-USER: CPT | Performed by: STUDENT IN AN ORGANIZED HEALTH CARE EDUCATION/TRAINING PROGRAM

## 2022-03-03 PROCEDURE — 1123F ACP DISCUSS/DSCN MKR DOCD: CPT | Performed by: STUDENT IN AN ORGANIZED HEALTH CARE EDUCATION/TRAINING PROGRAM

## 2022-03-03 PROCEDURE — 93000 ELECTROCARDIOGRAM COMPLETE: CPT | Performed by: STUDENT IN AN ORGANIZED HEALTH CARE EDUCATION/TRAINING PROGRAM

## 2022-03-03 PROCEDURE — 4040F PNEUMOC VAC/ADMIN/RCVD: CPT | Performed by: STUDENT IN AN ORGANIZED HEALTH CARE EDUCATION/TRAINING PROGRAM

## 2022-03-03 PROCEDURE — 3017F COLORECTAL CA SCREEN DOC REV: CPT | Performed by: STUDENT IN AN ORGANIZED HEALTH CARE EDUCATION/TRAINING PROGRAM

## 2022-03-03 PROCEDURE — 1090F PRES/ABSN URINE INCON ASSESS: CPT | Performed by: STUDENT IN AN ORGANIZED HEALTH CARE EDUCATION/TRAINING PROGRAM

## 2022-03-03 PROCEDURE — G8427 DOCREV CUR MEDS BY ELIG CLIN: HCPCS | Performed by: STUDENT IN AN ORGANIZED HEALTH CARE EDUCATION/TRAINING PROGRAM

## 2022-03-03 PROCEDURE — G8417 CALC BMI ABV UP PARAM F/U: HCPCS | Performed by: STUDENT IN AN ORGANIZED HEALTH CARE EDUCATION/TRAINING PROGRAM

## 2022-03-03 RX ORDER — ROSUVASTATIN CALCIUM 20 MG/1
20 TABLET, COATED ORAL DAILY
Qty: 90 TABLET | Refills: 3 | Status: SHIPPED | OUTPATIENT
Start: 2022-03-03

## 2022-03-07 ENCOUNTER — TELEPHONE (OUTPATIENT)
Dept: CARDIOLOGY CLINIC | Age: 72
End: 2022-03-07

## 2022-04-05 ENCOUNTER — HOSPITAL ENCOUNTER (OUTPATIENT)
Dept: CT IMAGING | Age: 72
Discharge: HOME | End: 2022-04-05
Payer: MEDICARE

## 2022-04-05 DIAGNOSIS — I71.21 ASCENDING AORTIC ANEURYSM: ICD-10-CM

## 2022-04-05 LAB — POC CREATININE WHOLE BLOOD: 1.1 MG/DL (ref 0.5–1.2)

## 2022-04-05 PROCEDURE — 82565 ASSAY OF CREATININE: CPT

## 2022-04-05 PROCEDURE — 6360000004 HC RX CONTRAST MEDICATION: Performed by: STUDENT IN AN ORGANIZED HEALTH CARE EDUCATION/TRAINING PROGRAM

## 2022-04-05 PROCEDURE — 71275 CT ANGIOGRAPHY CHEST: CPT

## 2022-04-05 RX ADMIN — IOPAMIDOL 85 ML: 755 INJECTION, SOLUTION INTRAVENOUS at 09:44

## 2022-06-07 ENCOUNTER — TELEPHONE (OUTPATIENT)
Dept: CARDIOLOGY CLINIC | Age: 72
End: 2022-06-07

## 2022-06-07 NOTE — TELEPHONE ENCOUNTER
Patient left voicemail on nurse line. She is wanting to donate blood. Ok to hold Eliquis X 2 days?  Please advise

## 2022-06-08 ENCOUNTER — HOSPITAL ENCOUNTER (OUTPATIENT)
Dept: MAMMOGRAPHY | Age: 72
Discharge: HOME | End: 2022-06-08
Payer: MEDICARE

## 2022-06-08 DIAGNOSIS — Z12.39 BREAST SCREENING: ICD-10-CM

## 2022-06-08 PROCEDURE — 77063 BREAST TOMOSYNTHESIS BI: CPT

## 2022-06-08 PROCEDURE — 77067 SCR MAMMO BI INCL CAD: CPT

## 2023-03-02 ENCOUNTER — OFFICE VISIT (OUTPATIENT)
Dept: CARDIOLOGY CLINIC | Age: 73
End: 2023-03-02

## 2023-03-02 VITALS
DIASTOLIC BLOOD PRESSURE: 63 MMHG | HEART RATE: 81 BPM | SYSTOLIC BLOOD PRESSURE: 114 MMHG | HEIGHT: 69 IN | WEIGHT: 212 LBS | BODY MASS INDEX: 31.4 KG/M2

## 2023-03-02 DIAGNOSIS — I71.21 ANEURYSM OF ASCENDING AORTA WITHOUT RUPTURE: ICD-10-CM

## 2023-03-02 DIAGNOSIS — I48.0 PAF (PAROXYSMAL ATRIAL FIBRILLATION) (HCC): Primary | ICD-10-CM

## 2023-03-02 RX ORDER — PANTOPRAZOLE SODIUM 40 MG/1
40 TABLET, DELAYED RELEASE ORAL DAILY
COMMUNITY

## 2023-03-02 RX ORDER — LEVOTHYROXINE SODIUM 0.03 MG/1
25 TABLET ORAL DAILY
COMMUNITY

## 2023-03-02 RX ORDER — CYCLOBENZAPRINE HCL 10 MG
10 TABLET ORAL 3 TIMES DAILY PRN
COMMUNITY

## 2023-03-02 RX ORDER — FLUTICASONE PROPIONATE 50 MCG
1 SPRAY, SUSPENSION (ML) NASAL DAILY
COMMUNITY

## 2023-03-02 RX ORDER — ROSUVASTATIN CALCIUM 20 MG/1
20 TABLET, COATED ORAL DAILY
Qty: 90 TABLET | Refills: 3 | Status: SHIPPED | OUTPATIENT
Start: 2023-03-02

## 2023-03-02 NOTE — PROGRESS NOTES
[address] [facility] [address]  [facility] [address]  Dept: [phone]  Dept Fax: [phone]  Loc: [phone]    Visit Date: 3/2/2023    Ms. Ana María Merida is a 67 y.o. female  who presented for:  Chief Complaint   Patient presents with    1 Year Follow Up       HPI:   HPI   68 yo F follows up for TAA 4 cm (stable) and Afib/flutter on Eliquis. Sometimes LONGORIA but not usually. She is working out daily. No chest pain, angina, LONGORIA, orthopnea, PND, sob at rest, palpitations, LE edema, or syncope. No bleeding. Current Outpatient Medications:     levothyroxine (SYNTHROID) 25 MCG tablet, Take 25 mcg by mouth Daily, Disp: , Rfl:     fluticasone (FLONASE) 50 MCG/ACT nasal spray, 1 spray by Each Nostril route daily, Disp: , Rfl:     pantoprazole (PROTONIX) 40 MG tablet, Take 40 mg by mouth daily, Disp: , Rfl:     cyclobenzaprine (FLEXERIL) 10 MG tablet, Take 10 mg by mouth 3 times daily as needed for Muscle spasms, Disp: , Rfl:     metoprolol tartrate (LOPRESSOR) 25 MG tablet, TAKE 1 TABLET TWICE A DAY, Disp: 180 tablet, Rfl: 3    apixaban (ELIQUIS) 5 MG TABS tablet, TAKE 1 TABLET TWICE A DAY, Disp: 180 tablet, Rfl: 3    rosuvastatin (CRESTOR) 20 MG tablet, Take 1 tablet by mouth daily, Disp: 90 tablet, Rfl: 3    denosumab (PROLIA) 60 MG/ML SOSY SC injection, Inject 60 mg into the skin once 2x year, Disp: , Rfl:     Psyllium (METAMUCIL FIBER PO), Take 1 Scoop by mouth daily, Disp: , Rfl:     aspirin 81 MG tablet, Take 81 mg by mouth daily, Disp: , Rfl:     nitroGLYCERIN (NITROSTAT) 0.4 MG SL tablet, Place 1 tablet under the tongue every 5 minutes as needed for Chest pain, Disp: 25 tablet, Rfl: 3    Calcium Citrate-Vitamin D 500-500 MG-UNIT CHEW, Take 1 tablet by mouth 2 times daily , Disp: , Rfl:     Past Medical History  Tye Newby  has a past medical history of Hyperlipidemia, IBS (irritable bowel syndrome), and Senile osteoporosis.     Social History  Carolyn Kerr  reports that she quit smoking about 45 years ago. She has never used smokeless tobacco. She reports current alcohol use. She reports that she does not use drugs. Family History  Carolyn Kerr family history includes Breast Cancer (age of onset: 52) in her daughter; Cancer (age of onset: 11) in her brother; Heart Disease in her mother; Stroke in her father and mother. There is no family history of bicuspid aortic valve, aneurysms, heart transplant, pacemakers, defibrillators, or sudden cardiac death. Past Surgical History   Past Surgical History:   Procedure Laterality Date    BACK SURGERY  2015    CATARACT REMOVAL Right 01/2020    CATARACT REMOVAL Left 02/2020    CHOLECYSTECTOMY, LAPAROSCOPIC N/A 7/24/2017    CHOLECYSTECTOMY LAPAROSCOPIC performed by Rudy Burris MD at [address] [place] [address]  08, 13, 2018    ERCP  7/28/2017    ERCP STENT INSERTION performed by Ramiro Altamirano MD at [facility]    ERCP Left 7/28/2017    ERCP ENDOSCOPIC RETROGRADE CHOLANGIOPANCREATOGRAPHY performed by Ramiro Altamirano MD at [address] [facility]    ERCP N/A 9/26/2017    ERCP  performed by Ramiro [facility]irano MD at [facility]  2019    banding    HYSTERECTOMY (CERVIX STATUS UNKNOWN)  1984    [facility]SO    KNEE ARTHROSCOPY Left 2014    [facility] STEROTACTIC LOC BREAST BIOPSY RIGHT Right 05/16/2006    fibrocystic changes    OVARY REMOVAL Bilateral     1984    TONSILLECTOMY      as a child    VEIN SURGERY Bilateral 2016    legs       Review of Systems   Constitutional: Negative for chills and fever  HENT: Negative for congestion, sinus pressure, sneezing and sore throat. Eyes: Negative for pain, discharge, redness and itching. Respiratory: Negative for apnea, cough  Gastrointestinal: Negative for blood in stool, constipation, diarrhea   Endocrine: Negative for cold intolerance, heat intolerance, polydipsia. Genitourinary: Negative for dysuria, enuresis, flank pain and hematuria.    Musculoskeletal: Negative for arthralgias, joint swelling and neck pain. Neurological: Negative for numbness and headaches. Psychiatric/Behavioral: Negative for agitation, confusion, decreased concentration and dysphoric mood. Objective:     /63   Pulse 81   Ht 5' 8.5\" (1.74 m)   Wt 212 lb (96.2 kg)   BMI 31.77 kg/m²     Wt Readings from Last 3 Encounters:   03/02/23 212 lb (96.2 kg)   03/03/22 208 lb 9.6 oz (94.6 kg)   01/14/21 203 lb (92.1 kg)     BP Readings from Last 3 Encounters:   03/02/23 114/63   03/03/22 113/69   04/28/21 132/72       Nursing note and vitals reviewed. Physical Exam   Constitutional: Oriented to person, place, and time. Appears well-developed and well-nourished. HENT:   Head: Normocephalic and atraumatic. Eyes: EOM are normal. Pupils are equal, round, and reactive to light. Neck: Normal range of motion. Neck supple. No JVD present. Cardiovascular: Normal rate, regular rhythm, normal heart sounds and intact distal pulses. No murmur heard. Pulmonary/Chest: Effort normal and breath sounds normal. No respiratory distress. No wheezes. No rales. Abdominal: Soft. Bowel sounds are normal. No distension. There is no tenderness. Musculoskeletal: Normal range of motion. No edema. Neurological: Alert and oriented to person, place, and time. No cranial nerve deficit. Coordination normal.   Skin: Skin is warm and dry. Psychiatric: Normal mood and affect.        No results found for: CKTOTAL, CKMB, CKMBINDEX    Lab Results   Component Value Date/Time    WBC 3.6 08/12/2022 12:00 PM    WBC 4.0 04/28/2021 02:51 PM    RBC 4.19 08/12/2022 12:00 PM    HGB 14.0 08/12/2022 12:00 PM    HCT 40.7 08/12/2022 12:00 PM    MCV 97.1 08/12/2022 12:00 PM    MCH 33.3 08/12/2022 12:00 PM    MCHC 34.3 08/12/2022 12:00 PM    RDW 13.6 08/12/2022 12:00 PM     08/12/2022 12:00 PM    MPV 10.4 04/28/2021 02:51 PM       Lab Results   Component Value Date/Time     08/12/2022 12:00 PM    K 4.1 08/12/2022 12:00 PM     08/12/2022 12:00 PM    CO2 26 08/12/2022 12:00 PM    BUN 21 08/12/2022 12:00 PM    LABALBU 3.8 08/12/2022 12:00 PM    CREATININE 1.1 08/12/2022 12:00 PM    CALCIUM 9.1 08/12/2022 12:00 PM    LABGLOM 50 08/12/2022 12:00 PM    GLUCOSE 84 08/12/2022 12:00 PM       Lab Results   Component Value Date/Time    ALKPHOS 62 08/12/2022 12:00 PM    ALKPHOS 96 07/31/2017 04:47 AM    ALT 23 08/12/2022 12:00 PM    AST 31 08/12/2022 12:00 PM    PROT 5.2 07/31/2017 04:47 AM    BILITOT 0.9 08/12/2022 12:00 PM    BILIDIR 3.5 07/29/2017 03:58 AM    LABALBU 3.8 08/12/2022 12:00 PM       Lab Results   Component Value Date/Time    MG 1.7 07/31/2017 04:47 AM       Lab Results   Component Value Date    INR 1.00 08/12/2015         No results found for: LABA1C    No results found for: TRIG, HDL, LDLCALC, LDLDIRECT, LABVLDL    Lab Results   Component Value Date/Time    TSH 3.900 07/25/2017 04:12 PM         Testing Reviewed:      I have individually reviewed the cardiac test below:    ECHO: No results found for this or any previous visit. Assessment/Plan   Afib/flutter on Eliquis  TAA - stable, 4.1 cm, 3/2022 CTA  Re-check CTA. Continue Eliquis, no bleeding. Discussed diet/exercise/BP/weight loss/health lifestyle choices/lipids; the patient understands the goals and will try to comply.     Disposition:  1 year           Electronically signed by Joe Marcelino MD   3/2/2023 at 2:28 PM EST

## 2023-03-11 ENCOUNTER — HOSPITAL ENCOUNTER (EMERGENCY)
Age: 73
Discharge: HOME | DRG: 543 | End: 2023-03-12
Attending: STUDENT IN AN ORGANIZED HEALTH CARE EDUCATION/TRAINING PROGRAM
Payer: MEDICARE

## 2023-03-11 ENCOUNTER — APPOINTMENT (OUTPATIENT)
Dept: CT IMAGING | Age: 73
DRG: 543 | End: 2023-03-11
Payer: MEDICARE

## 2023-03-11 DIAGNOSIS — S32.040A COMPRESSION FRACTURE OF L4 LUMBAR VERTEBRA, CLOSED, INITIAL ENCOUNTER (HCC): Primary | ICD-10-CM

## 2023-03-11 LAB
ALBUMIN SERPL BCG-MCNC: 4 G/DL (ref 3.5–5.1)
ALP SERPL-CCNC: 67 U/L (ref 38–126)
ALT SERPL W/O P-5'-P-CCNC: 15 U/L (ref 11–66)
ANION GAP SERPL CALC-SCNC: 11 MEQ/L (ref 8–16)
AST SERPL-CCNC: 28 U/L (ref 5–40)
BASE EXCESS BLDA CALC-SCNC: 1.5 MMOL/L (ref -2.5–2.5)
BILIRUB CONJ SERPL-MCNC: < 0.2 MG/DL (ref 0–0.3)
BILIRUB SERPL-MCNC: 0.7 MG/DL (ref 0.3–1.2)
BUN SERPL-MCNC: 26 MG/DL (ref 7–22)
CA-I BLD ISE-SCNC: 1.13 MMOL/L (ref 1.12–1.32)
CALCIUM SERPL-MCNC: 9.3 MG/DL (ref 8.5–10.5)
CHLORIDE BLD-SCNC: 111 MEQ/L (ref 98–109)
CHLORIDE SERPL-SCNC: 108 MEQ/L (ref 98–111)
CO2 SERPL-SCNC: 22 MEQ/L (ref 23–33)
COLLECTED BY:: ABNORMAL
CREAT SERPL-MCNC: 1.1 MG/DL (ref 0.4–1.2)
GFR SERPL CREATININE-BSD FRML MDRD: 53 ML/MIN/1.73M2
GLUCOSE BLD-MCNC: 105 MG/DL (ref 70–108)
GLUCOSE SERPL-MCNC: 109 MG/DL (ref 70–108)
HCO3 BLDA-SCNC: 23 MMOL/L (ref 23–28)
LACTATE BLD-SCNC: 0.7 MMOL/L (ref 0.5–1.9)
LACTATE SERPL-SCNC: 0.8 MMOL/L (ref 0.5–2)
LIPASE SERPL-CCNC: 52 U/L (ref 5.6–51.3)
MAGNESIUM SERPL-MCNC: 1.9 MG/DL (ref 1.6–2.4)
NT-PROBNP SERPL IA-MCNC: 1446 PG/ML (ref 0–124)
OSMOLALITY SERPL CALC.SUM OF ELEC: 286.6 MOSMOL/KG (ref 275–300)
PCO2 BLDA: 26 MMHG (ref 35–45)
PH BLDA: 7.55 [PH] (ref 7.35–7.45)
PO2 BLDA: 152 MMHG (ref 71–104)
POC CREATININE WHOLE BLOOD: 1.1 MG/DL (ref 0.5–1.2)
POTASSIUM BLD-SCNC: 4.1 MEQ/L (ref 3.5–4.9)
POTASSIUM SERPL-SCNC: 4.2 MEQ/L (ref 3.5–5.2)
PROT SERPL-MCNC: 6.4 G/DL (ref 6.1–8)
SAO2 % BLDA: 100 %
SODIUM BLD-SCNC: 144 MEQ/L (ref 138–146)
SODIUM SERPL-SCNC: 141 MEQ/L (ref 135–145)
TROPONIN T: < 0.01 NG/ML

## 2023-03-11 PROCEDURE — 96375 TX/PRO/DX INJ NEW DRUG ADDON: CPT

## 2023-03-11 PROCEDURE — 6360000002 HC RX W HCPCS: Performed by: STUDENT IN AN ORGANIZED HEALTH CARE EDUCATION/TRAINING PROGRAM

## 2023-03-11 PROCEDURE — 83735 ASSAY OF MAGNESIUM: CPT

## 2023-03-11 PROCEDURE — 82803 BLOOD GASES ANY COMBINATION: CPT

## 2023-03-11 PROCEDURE — 82248 BILIRUBIN DIRECT: CPT

## 2023-03-11 PROCEDURE — 82435 ASSAY OF BLOOD CHLORIDE: CPT

## 2023-03-11 PROCEDURE — 71275 CT ANGIOGRAPHY CHEST: CPT

## 2023-03-11 PROCEDURE — 2580000003 HC RX 258: Performed by: STUDENT IN AN ORGANIZED HEALTH CARE EDUCATION/TRAINING PROGRAM

## 2023-03-11 PROCEDURE — 93005 ELECTROCARDIOGRAM TRACING: CPT | Performed by: STUDENT IN AN ORGANIZED HEALTH CARE EDUCATION/TRAINING PROGRAM

## 2023-03-11 PROCEDURE — 85025 COMPLETE CBC W/AUTO DIFF WBC: CPT

## 2023-03-11 PROCEDURE — 96374 THER/PROPH/DIAG INJ IV PUSH: CPT

## 2023-03-11 PROCEDURE — 82565 ASSAY OF CREATININE: CPT

## 2023-03-11 PROCEDURE — 84295 ASSAY OF SERUM SODIUM: CPT

## 2023-03-11 PROCEDURE — 6360000002 HC RX W HCPCS

## 2023-03-11 PROCEDURE — 83690 ASSAY OF LIPASE: CPT

## 2023-03-11 PROCEDURE — 36415 COLL VENOUS BLD VENIPUNCTURE: CPT

## 2023-03-11 PROCEDURE — 83880 ASSAY OF NATRIURETIC PEPTIDE: CPT

## 2023-03-11 PROCEDURE — 84484 ASSAY OF TROPONIN QUANT: CPT

## 2023-03-11 PROCEDURE — 99285 EMERGENCY DEPT VISIT HI MDM: CPT

## 2023-03-11 PROCEDURE — 83605 ASSAY OF LACTIC ACID: CPT

## 2023-03-11 PROCEDURE — 84132 ASSAY OF SERUM POTASSIUM: CPT

## 2023-03-11 PROCEDURE — 6370000000 HC RX 637 (ALT 250 FOR IP): Performed by: STUDENT IN AN ORGANIZED HEALTH CARE EDUCATION/TRAINING PROGRAM

## 2023-03-11 PROCEDURE — 82947 ASSAY GLUCOSE BLOOD QUANT: CPT

## 2023-03-11 PROCEDURE — 74174 CTA ABD&PLVS W/CONTRAST: CPT

## 2023-03-11 PROCEDURE — 80053 COMPREHEN METABOLIC PANEL: CPT

## 2023-03-11 PROCEDURE — 6360000004 HC RX CONTRAST MEDICATION: Performed by: STUDENT IN AN ORGANIZED HEALTH CARE EDUCATION/TRAINING PROGRAM

## 2023-03-11 PROCEDURE — 82330 ASSAY OF CALCIUM: CPT

## 2023-03-11 RX ORDER — ONDANSETRON 2 MG/ML
INJECTION INTRAMUSCULAR; INTRAVENOUS
Status: COMPLETED
Start: 2023-03-11 | End: 2023-03-11

## 2023-03-11 RX ORDER — ACETAMINOPHEN 325 MG/1
650 TABLET ORAL ONCE
Status: COMPLETED | OUTPATIENT
Start: 2023-03-11 | End: 2023-03-11

## 2023-03-11 RX ORDER — DROPERIDOL 2.5 MG/ML
0.62 INJECTION, SOLUTION INTRAMUSCULAR; INTRAVENOUS ONCE
Status: COMPLETED | OUTPATIENT
Start: 2023-03-11 | End: 2023-03-11

## 2023-03-11 RX ORDER — 0.9 % SODIUM CHLORIDE 0.9 %
1000 INTRAVENOUS SOLUTION INTRAVENOUS ONCE
Status: COMPLETED | OUTPATIENT
Start: 2023-03-11 | End: 2023-03-12

## 2023-03-11 RX ORDER — MORPHINE SULFATE 2 MG/ML
2 INJECTION, SOLUTION INTRAMUSCULAR; INTRAVENOUS ONCE
Status: COMPLETED | OUTPATIENT
Start: 2023-03-11 | End: 2023-03-11

## 2023-03-11 RX ADMIN — ACETAMINOPHEN 650 MG: 325 TABLET ORAL at 22:33

## 2023-03-11 RX ADMIN — DROPERIDOL 0.62 MG: 2.5 INJECTION, SOLUTION INTRAMUSCULAR; INTRAVENOUS at 22:34

## 2023-03-11 RX ADMIN — SODIUM CHLORIDE 1000 ML: 9 INJECTION, SOLUTION INTRAVENOUS at 22:45

## 2023-03-11 RX ADMIN — ONDANSETRON: 2 INJECTION INTRAMUSCULAR; INTRAVENOUS at 23:11

## 2023-03-11 RX ADMIN — MORPHINE SULFATE 2 MG: 2 INJECTION, SOLUTION INTRAMUSCULAR; INTRAVENOUS at 22:34

## 2023-03-11 RX ADMIN — IOPAMIDOL 80 ML: 755 INJECTION, SOLUTION INTRAVENOUS at 22:59

## 2023-03-11 ASSESSMENT — PAIN DESCRIPTION - ORIENTATION: ORIENTATION: LOWER

## 2023-03-11 ASSESSMENT — PAIN DESCRIPTION - LOCATION: LOCATION: BACK

## 2023-03-11 ASSESSMENT — PAIN SCALES - GENERAL: PAINLEVEL_OUTOF10: 10

## 2023-03-11 ASSESSMENT — PAIN - FUNCTIONAL ASSESSMENT: PAIN_FUNCTIONAL_ASSESSMENT: 0-10

## 2023-03-12 VITALS
HEIGHT: 69 IN | BODY MASS INDEX: 30.36 KG/M2 | TEMPERATURE: 97.7 F | OXYGEN SATURATION: 94 % | RESPIRATION RATE: 19 BRPM | WEIGHT: 205 LBS | DIASTOLIC BLOOD PRESSURE: 67 MMHG | SYSTOLIC BLOOD PRESSURE: 121 MMHG | HEART RATE: 86 BPM

## 2023-03-12 LAB
BASOPHILS ABSOLUTE: 0 THOU/MM3 (ref 0–0.1)
BASOPHILS NFR BLD AUTO: 0.8 %
DEPRECATED RDW RBC AUTO: 47.2 FL (ref 35–45)
EKG Q-T INTERVAL: 376 MS
EKG QRS DURATION: 80 MS
EKG QTC CALCULATION (BAZETT): 457 MS
EKG R AXIS: 6 DEGREES
EKG T AXIS: 46 DEGREES
EKG VENTRICULAR RATE: 89 BPM
EOSINOPHIL NFR BLD AUTO: 6.7 %
EOSINOPHILS ABSOLUTE: 0.3 THOU/MM3 (ref 0–0.4)
ERYTHROCYTE [DISTWIDTH] IN BLOOD BY AUTOMATED COUNT: 13.1 % (ref 11.5–14.5)
HCT VFR BLD AUTO: 39.5 % (ref 37–47)
HGB BLD-MCNC: 13.6 GM/DL (ref 12–16)
IMM GRANULOCYTES # BLD AUTO: 0.02 THOU/MM3 (ref 0–0.07)
IMM GRANULOCYTES NFR BLD AUTO: 0.4 %
LYMPHOCYTES ABSOLUTE: 1.6 THOU/MM3 (ref 1–4.8)
LYMPHOCYTES NFR BLD AUTO: 32.8 %
MCH RBC QN AUTO: 33.3 PG (ref 26–33)
MCHC RBC AUTO-ENTMCNC: 34.4 GM/DL (ref 32.2–35.5)
MCV RBC AUTO: 96.8 FL (ref 81–99)
MONOCYTES ABSOLUTE: 0.4 THOU/MM3 (ref 0.4–1.3)
MONOCYTES NFR BLD AUTO: 8.7 %
NEUTROPHILS NFR BLD AUTO: 50.6 %
NRBC BLD AUTO-RTO: 0 /100 WBC
PLATELET # BLD AUTO: 146 THOU/MM3 (ref 130–400)
PMV BLD AUTO: 10.4 FL (ref 9.4–12.4)
RBC # BLD AUTO: 4.08 MILL/MM3 (ref 4.2–5.4)
SEGMENTED NEUTROPHILS ABSOLUTE COUNT: 2.5 THOU/MM3 (ref 1.8–7.7)
WBC # BLD AUTO: 4.9 THOU/MM3 (ref 4.8–10.8)

## 2023-03-12 PROCEDURE — 93010 ELECTROCARDIOGRAM REPORT: CPT | Performed by: INTERNAL MEDICINE

## 2023-03-12 PROCEDURE — 6370000000 HC RX 637 (ALT 250 FOR IP): Performed by: STUDENT IN AN ORGANIZED HEALTH CARE EDUCATION/TRAINING PROGRAM

## 2023-03-12 RX ORDER — OXYCODONE HYDROCHLORIDE AND ACETAMINOPHEN 5; 325 MG/1; MG/1
1 TABLET ORAL EVERY 6 HOURS PRN
Qty: 6 TABLET | Refills: 0 | Status: ON HOLD | OUTPATIENT
Start: 2023-03-12 | End: 2023-03-18 | Stop reason: HOSPADM

## 2023-03-12 RX ORDER — HYDROCODONE BITARTRATE AND ACETAMINOPHEN 5; 325 MG/1; MG/1
1 TABLET ORAL EVERY 6 HOURS PRN
Qty: 6 TABLET | Refills: 0 | Status: SHIPPED | OUTPATIENT
Start: 2023-03-12 | End: 2023-03-12 | Stop reason: CLARIF

## 2023-03-12 RX ORDER — LIDOCAINE 4 G/G
1 PATCH TOPICAL ONCE
Status: DISCONTINUED | OUTPATIENT
Start: 2023-03-12 | End: 2023-03-12 | Stop reason: HOSPADM

## 2023-03-12 RX ORDER — OXYCODONE HYDROCHLORIDE AND ACETAMINOPHEN 5; 325 MG/1; MG/1
1 TABLET ORAL ONCE
Status: COMPLETED | OUTPATIENT
Start: 2023-03-12 | End: 2023-03-12

## 2023-03-12 RX ORDER — LIDOCAINE 50 MG/G
1 PATCH TOPICAL DAILY
Qty: 7 PATCH | Refills: 0 | Status: SHIPPED | OUTPATIENT
Start: 2023-03-12 | End: 2023-03-19

## 2023-03-12 RX ORDER — ORPHENADRINE CITRATE 100 MG/1
100 TABLET, EXTENDED RELEASE ORAL 2 TIMES DAILY
Qty: 10 TABLET | Refills: 0 | Status: ON HOLD | OUTPATIENT
Start: 2023-03-12 | End: 2023-03-18 | Stop reason: HOSPADM

## 2023-03-12 RX ADMIN — OXYCODONE HYDROCHLORIDE AND ACETAMINOPHEN 1 TABLET: 5; 325 TABLET ORAL at 00:48

## 2023-03-12 NOTE — ED NOTES
Pt and vs reassessed. RR easy and unlabored. Pt resting in bed alert and medicated per MAR.  Pt stable at this time     Ric Sherman, [address] [facility]  03/11/23 9141

## 2023-03-12 NOTE — ED TRIAGE NOTES
Pt presents to the ED via EMS with c/o abdominal pain, back pain and vomiting. Pt states she felt fine all day until after supper. Pt states she is having sharp lower abdominal and lower back pain. EMS states pt received 50 mcg fentanyl and 4 mg zofran pta. Pt arrived with IV in place. EMS states pt vomited on way to the hospital. Pt states pain and nausea have not decreased since medication was given. Pt denies chest pain or SOB at this time.  EKG complete

## 2023-03-12 NOTE — ED NOTES
Pt and vs reassessed. RR easy and unlabored. Pt resting in bed with eyes closed and responds to voice.  Pt stable at this time     Amyon Merle, [address] [facility]  03/12/23 3899

## 2023-03-12 NOTE — ED PROVIDER NOTES
[facility] [address] [address] EMERGENCY DEPT        Pt Name: Art Costello  MRN: [id number]  [place] 1950  Date of evaluation: 3/11/2023  Treating Resident Physician: Leonel Dunham MD  Supervising Physician: Dr. Ankush Collins, [address]       Chief Complaint   Patient presents with    Back Pain    Emesis           HISTORY OF PRESENT ILLNESS & REVIEW OF SYSTEMS   HPI    History obtained from patient and family at bedside. Art Costello is a 67 y.o. female who presents to the emergency department for evaluation of abdominal pain. Patient ate dinner and then around 5:30 PM she began having severe abdominal pain. Says that it radiates to her back. Says is mostly in the lower part of her abdomen as well as in the right side. Mentions some nonbilious nonbloody emesis. Denies having pain like this in the past.  Denies any dysuria. Denies any headache fever chills. Denies any cough chest pain shortness of breath. Says that she is on Eliquis for atrial fibrillation. Says that she has had her gallbladder removed but no other abdominal surgeries. Says that she received pain medication as well as Zofran on route but she is still nauseous. Says she has chronic lower extremity swelling unchanged from baseline        Patient denies any new Headache, Fever, Chills, Cough, Chest pain, Shortness of breath, Diarrhea, Constipation, and Leg swelling. The patient has no other acute complaints at this time. Review of systems as above.           PAST MEDICAL AND SURGICAL HISTORY     Past Medical History:   Diagnosis Date    Hyperlipidemia     IBS (irritable bowel syndrome)     Senile osteoporosis      Past Surgical History:   Procedure Laterality Date    BACK SURGERY  2015    CATARACT REMOVAL Right 01/2020    CATARACT REMOVAL Left 02/2020    CHOLECYSTECTOMY, LAPAROSCOPIC N/A 7/24/2017    CHOLECYSTECTOMY LAPAROSCOPIC performed by Shannan Mcdaniel MD at [place] [address]  08, 13, 2018    ERCP  7/28/2017 ERCP STENT INSERTION performed by Ann Marie Christianson MD at [facility]    ERCP Left 7/28/2017    ERCP ENDOSCOPIC RETROGRADE CHOLANGIOPANCREATOGRAPHY performed by Ann Marie Christianson MD at [facility] [place]    ERCP N/A 9/26/2017    ERCP  performed by Ann Marie Christianson MD at [facility]  2019    banding    HYSTERECTOMY (CERVIX STATUS UNKNOWN)  1984    JOSETTE BSO    KNEE ARTHROSCOPY Left 2014    CASIMIRO STEROTACTIC LOC BREAST BIOPSY RIGHT Right 05/16/2006    fibrocystic changes    OVARY REMOVAL Bilateral     1984    TONSILLECTOMY      as a child    VEIN SURGERY Bilateral 2016    legs     Patient Active Problem List   Diagnosis Code    Osteoporosis S52.6    Biliary colic T87.56    Narrow complex tachycardia (HCC) I47.1    IBS (irritable bowel syndrome) K58.9    Hyperlipidemia E78.5    S/P laparoscopic cholecystectomy Z90.49    Ascending aortic aneurysm, 4 cm per CT-chest from 7/25 I71.21    Abnormal EKG - ST depression; cardio recommends outpatient cardiac cath R94.31    Postoperative ileus (HCC) K91.89, K56.7    PAF (paroxysmal atrial fibrillation) (HCC) I48.0         MEDICATIONS     Current Facility-Administered Medications:     lidocaine 4 % external patch 1 patch, 1 patch, TransDERmal, Once, Stephanie Eastman MD, 1 patch at 03/12/23 0015    Current Outpatient Medications:     lidocaine (LIDODERM) 5 %, Place 1 patch onto the skin daily for 7 days 12 hours on, 12 hours off., Disp: 7 patch, Rfl: 0    orphenadrine (NORFLEX) 100 MG extended release tablet, Take 1 tablet by mouth 2 times daily for 5 days, Disp: 10 tablet, Rfl: 0    oxyCODONE-acetaminophen (PERCOCET) 5-325 MG per tablet, Take 1 tablet by mouth every 6 hours as needed for Pain for up to 3 days. Intended supply: 3 days.  Take lowest dose possible to manage pain Max Daily Amount: 4 tablets, Disp: 6 tablet, Rfl: 0    levothyroxine (SYNTHROID) 25 MCG tablet, Take 25 mcg by mouth Daily, Disp: , Rfl:     fluticasone (FLONASE) 50 MCG/ACT nasal spray, 1 spray by Each Nostril route daily, Disp: , Rfl:     pantoprazole (PROTONIX) 40 MG tablet, Take 40 mg by mouth daily, Disp: , Rfl:     cyclobenzaprine (FLEXERIL) 10 MG tablet, Take 10 mg by mouth 3 times daily as needed for Muscle spasms, Disp: , Rfl:     rosuvastatin (CRESTOR) 20 MG tablet, Take 1 tablet by mouth daily, Disp: 90 tablet, Rfl: 3    metoprolol tartrate (LOPRESSOR) 25 MG tablet, TAKE 1 TABLET TWICE A DAY, Disp: 180 tablet, Rfl: 3    apixaban (ELIQUIS) 5 MG TABS tablet, TAKE 1 TABLET TWICE A DAY, Disp: 180 tablet, Rfl: 3    denosumab (PROLIA) 60 MG/ML SOSY SC injection, Inject 60 mg into the skin once 2x year, Disp: , Rfl:     Psyllium (METAMUCIL FIBER PO), Take 1 Scoop by mouth daily, Disp: , Rfl:     aspirin 81 MG tablet, Take 81 mg by mouth daily, Disp: , Rfl:     nitroGLYCERIN (NITROSTAT) 0.4 MG SL tablet, Place 1 tablet under the tongue every 5 minutes as needed for Chest pain, Disp: 25 tablet, Rfl: 3    Calcium Citrate-Vitamin D 500-500 MG-UNIT CHEW, Take 1 tablet by mouth 2 times daily , Disp: , Rfl:   Discharge Medication List as of 3/12/2023 12:18 AM        CONTINUE these medications which have NOT CHANGED    Details   levothyroxine (SYNTHROID) 25 MCG tablet Take 25 mcg by mouth DailyHistorical Med      fluticasone (FLONASE) 50 MCG/ACT nasal spray 1 spray by Each Nostril route dailyHistorical Med      pantoprazole (PROTONIX) 40 MG tablet Take 40 mg by mouth dailyHistorical Med      cyclobenzaprine (FLEXERIL) 10 MG tablet Take 10 mg by mouth 3 times daily as needed for Muscle spasmsHistorical Med      rosuvastatin (CRESTOR) 20 MG tablet Take 1 tablet by mouth daily, Disp-90 tablet, R-3Normal      metoprolol tartrate (LOPRESSOR) 25 MG tablet TAKE 1 TABLET TWICE A DAY, Disp-180 tablet, R-3Normal      apixaban (ELIQUIS) 5 MG TABS tablet TAKE 1 TABLET TWICE A DAY, Disp-180 tablet, R-3Normal      denosumab (PROLIA) 60 MG/ML SOSY SC injection Inject 60 mg into the skin once 2x yearHistorical Med Psyllium (METAMUCIL FIBER PO) Take 1 Scoop by mouth dailyHistorical Med      aspirin 81 MG tablet Take 81 mg by mouth dailyHistorical Med      nitroGLYCERIN (NITROSTAT) 0.4 MG SL tablet Place 1 tablet under the tongue every 5 minutes as needed for Chest pain, Disp-25 tablet, R-3Normal      Calcium Citrate-Vitamin D 500-500 MG-UNIT CHEW Take 1 tablet by mouth 2 times daily Historical Med               SOCIAL HISTORY     Social History     Social History Narrative    Not on file     Social History     Tobacco Use    Smoking status: Former     Types: Cigarettes     Quit date: 1978     Years since quittin.2    Smokeless tobacco: Never   Substance Use Topics    Alcohol use: Yes     Comment: rarely    Drug use: No         ALLERGIES     Allergies   Allergen Reactions    Amitiza [Lubiprostone] Diarrhea    Bentyl [Dicyclomine Hcl]      Severe spasms    Lipitor [Atorvastatin]      Muscle aches    Soma [Carisoprodol] Nausea And Vomiting         FAMILY HISTORY     Family History   Problem Relation Age of Onset    Stroke Mother     Heart Disease Mother     Stroke Father     Cancer Brother 5        leukemia    Breast Cancer Daughter 52         PHYSICAL EXAM     ED Triage Vitals   BP Temp Temp src Pulse Resp SpO2 Height Weight   -- -- -- -- -- -- -- --     Initial vital signs and nursing assessment reviewed and vitals are/show: Afebrile, Normotensive, Normocardic, and Borderline tachypneic . Pulsoximetry is normal per my interpretation. Additional Vital Signs:  Vitals:    23 0020   BP: 121/67   Pulse: 86   Resp: 19   Temp:    SpO2: 94%       Physical Exam  Constitutional:       General: She is not in acute distress. Appearance: Normal appearance. She is obese. She is not ill-appearing, toxic-appearing or diaphoretic. Comments: Diaphoretic   HENT:      Head: Normocephalic and atraumatic. Right Ear: External ear normal.      Left Ear: External ear normal.   Eyes:      General: No scleral icterus. Right eye: No discharge. Left eye: No discharge. Cardiovascular:      Rate and Rhythm: Normal rate and regular rhythm. Pulmonary:      Effort: Pulmonary effort is normal. No respiratory distress. Breath sounds: Normal breath sounds. No stridor. No wheezing, rhonchi or rales. Chest:      Chest wall: No tenderness. Abdominal:      General: Abdomen is flat. There is no distension. Palpations: Abdomen is soft. Tenderness: There is abdominal tenderness. There is no right CVA tenderness, left CVA tenderness, guarding or rebound. Comments: Right upper quadrant and right lower quadrant tenderness palpation  No guarding no rebound no rigidity   Musculoskeletal:      Cervical back: Neck supple. Right lower leg: Edema present. Left lower leg: Edema present. Comments: Tenderness to palpation bilateral lumbar spine paraspinal region. No midline cervical thoracic or lumbar spine tenderness to palpation no step-offs or deformities  Symmetric bilateral lower extremity nonpitting edema. Skin:     General: Skin is warm and dry. Neurological:      Mental Status: She is alert and oriented to person, place, and time. Mental status is at baseline. Psychiatric:         Mood and Affect: Mood normal.         Behavior: Behavior normal.         Thought Content: Thought content normal.         Judgment: Judgment normal.       PREVIOUS RECORDS   Previous records reviewed:  Patient was seen 3/2/2023 for cardiology office visit regarding paroxysmal A-fib . MEDICAL DECISION MAKING/ED COURSE   Initial Assessment:     67 y.o. female presenting to the ED for abdominal pain.     Differential diagnoses include but not limited to: AAA, dissection, pancreatitis, appendicitis, obstruction, fracture desiccation laceration abrasion, intra-abdominal bleed, ACS, arrhythmia, infection     Plan:       EKG  Labs  Imaging  IV fluids  Tylenol  Morphine  Droperidol  Zofran  Percocet  Lidocaine patch  Discharge        Brief Summary of Patient Presentation:    Patient is a 67 y.o. female with a past medical history of ascending aortic aneurysm, paroxysmal A-fib on Eliquis, IBS, hyperlipidemia who was seen and evaluated in the emergency department for abdominal pain. Patient appeared to be in acute distress as she was diaphoretic and having significant pain. Pain was primarily in the back as well as in her abdomen. We got up point-of-care lab work to evaluate for creatinine given her history of a ascending aortic aneurysm and concern for catastrophic abdominal pathology. We gave her analgesia IV fluids and antiemetics with improvement in her symptoms. Lab work was pretty unremarkable. CT imaging showed an L4 compression fracture indeterminate in age but given her new onset of symptoms it is likely new. She was able to ambulate and had no neurological symptoms so at this point we do not think she needs a MRI emergently. She had significant improvement in her symptoms in the ED. She was otherwise improved. After discussion with patient and family at bedside, through shared decision making, she felt comfortable with discharge. She was discharged with analgesia as well as orthopedic follow-up. The patient was seen and examined unless otherwise specified. Appropriate diagnostic testing was performed and results reviewed with the patient. My independent review and interpretation of data, imaging, labs and diagnostic evaluations are as above and in the ED Course. Previous patient encounter documents & history available on EMR were reviewed  Case discussed with consulting clinician:  none  Shared Decision-Making was performed and disposition discussed with the Patient/Family and questions answered.      MDM  Number of Diagnoses or Management Options  Compression fracture of L4 lumbar vertebra, closed, initial encounter [facility]): new, needed workup     Amount and/or Complexity of Data Reviewed  Clinical lab tests: reviewed and ordered  Tests in the radiology section of CPT®: ordered and reviewed  Tests in the medicine section of CPT®: ordered and reviewed  Decide to obtain previous medical records or to obtain history from someone other than the patient: yes  Obtain history from someone other than the patient: yes  Review and summarize past medical records: yes  Discuss the patient with other providers: no  Independent visualization of images, tracings, or specimens: yes    /  ED Course as of 03/12/23 0143   Sat Mar 11, 2023   2227 We will check POCT creatinine and have patient go to Radiology prior to formal labwork being obtained [CR]   2240 Spoke with radiology; they will get patient next for scans [CR]   2353 CTA:IMPRESSION:  Impression:     No acute vascular abnormality    [CR]   2353 CTA:Findings:     Abdominal aorta normal in caliber without dissection. It is considerably tortuous with calcified plaques noted. Celiac, mesenteric and renal origins patent without stenosis. There are calcified plaques at the origins of all of these vessels. Age-indeterminate sclerotic moderate L4 compression fracture. Superior endplate is sclerotic, finding is likely chronic. L1 compression fracture is unchanged. Heterogeneous left lobe of liver without definite focal abnormality. This may be due to the arterial phase imaging for this study. No focal abnormalities liver or spleen. Hepatomegaly. Pancreas, adrenal glands and kidneys unremarkable. No renal stones or hydronephrosis. Cholecystectomy. No significant free fluid or adenopathy noted in the pelvis. No evidence for diverticulitis. Appendix not identified. Hysterectomy. No adnexal abnormality.      Impression:     No acute process [CR]   Sun Mar 12, 2023   0018 CBC unremarkable  ABG shows PCO2 of 26 pH of 7.55  BMP unremarkable  Lactic acid within normal limits  Troponin within normal limits  BNP elevated 1446  Hepatic function panel magnesium unremarkable. [CR]      ED Course User Index  [CR] Francois Gonzalez MD       ED COURSE:  ED Course as of 03/12/23 0143   Sat Mar 11, 2023   2227 We will check POCT creatinine and have patient go to Radiology prior to formal labwork being obtained [CR]   2240 Spoke with radiology; they will get patient next for scans [CR]   2353 CTA:IMPRESSION:  Impression:     No acute vascular abnormality    [CR]   2353 CTA:Findings:     Abdominal aorta normal in caliber without dissection. It is considerably tortuous with calcified plaques noted. Celiac, mesenteric and renal origins patent without stenosis. There are calcified plaques at the origins of all of these vessels. Age-indeterminate sclerotic moderate L4 compression fracture. Superior endplate is sclerotic, finding is likely chronic. L1 compression fracture is unchanged. Heterogeneous left lobe of liver without definite focal abnormality. This may be due to the arterial phase imaging for this study. No focal abnormalities liver or spleen. Hepatomegaly. Pancreas, adrenal glands and kidneys unremarkable. No renal stones or hydronephrosis. Cholecystectomy. No significant free fluid or adenopathy noted in the pelvis. No evidence for diverticulitis. Appendix not identified. Hysterectomy. No adnexal abnormality.      Impression:     No acute process [CR]   Sun Mar 12, 2023   0018 CBC unremarkable  ABG shows PCO2 of 26 pH of 7.55  BMP unremarkable  Lactic acid within normal limits  Troponin within normal limits  BNP elevated 1446  Hepatic function panel magnesium unremarkable. [CR]      ED Course User Index  [CR] Francois Gonzalez MD                 ED Medications administered this visit:  (None if blank)  Medications   lidocaine 4 % external patch 1 patch (1 patch TransDERmal Patch Applied 3/12/23 0015)   0.9 % sodium chloride bolus (0 mLs IntraVENous Stopped 3/12/23 0015)   droperidol (INAPSINE) injection 0.625 mg (0.625 mg IntraVENous Given 3/11/23 2234)   acetaminophen (TYLENOL) tablet 650 mg (650 mg Oral Given 3/11/23 2233)   morphine (PF) injection 2 mg (2 mg IntraVENous Given 3/11/23 2234)   iopamidol (ISOVUE-370) 76 % injection 80 mL (80 mLs IntraVENous Given 3/11/23 2259)   ondansetron (ZOFRAN) 4 MG/2ML injection (  Given 3/11/23 2311)   oxyCODONE-acetaminophen (PERCOCET) 5-325 MG per tablet 1 tablet (1 tablet Oral Given 3/12/23 0048)         PROCEDURES: (None if blank)  Procedures:     CRITICAL CARE: (None if blank)      DISCHARGE PRESCRIPTIONS: (None if blank)  Discharge Medication List as of 3/12/2023 12:18 AM        START taking these medications    Details   lidocaine (LIDODERM) 5 % Place 1 patch onto the skin daily for 7 days 12 hours on, 12 hours off., Disp-7 patch, R-0Normal      orphenadrine (NORFLEX) 100 MG extended release tablet Take 1 tablet by mouth 2 times daily for 5 days, Disp-10 tablet, R-0Normal      oxyCODONE-acetaminophen (PERCOCET) 5-325 MG per tablet Take 1 tablet by mouth every 6 hours as needed for Pain for up to 3 days. Intended supply: 3 days. Take lowest dose possible to manage pain Max Daily Amount: 4 tablets, Disp-6 tablet, R-0Print             FORMAL DIAGNOSTIC RESULTS     RADIOLOGY: Interpretation per the Radiologist below, if available at the time of this note (none if blank):    CTA CHEST W WO CONTRAST   Final Result   Impression:      No acute vascular abnormality      __________________________________      CT angiogram abdomen and pelvis with contrast   Multiplanar reconstructions and 3D processing      Comparison: 04/05/2022      Findings:      Abdominal aorta normal in caliber without dissection. It is considerably tortuous with calcified plaques noted. Celiac, mesenteric and renal origins patent without stenosis. There are calcified plaques at the origins of all of these vessels. Age-indeterminate sclerotic moderate L4 compression fracture.    Superior endplate is sclerotic, finding is likely chronic. L1 compression fracture is unchanged. Heterogeneous left lobe of liver without definite focal abnormality. This may be due to the arterial phase imaging for this study. No focal abnormalities liver or spleen. Hepatomegaly. Pancreas, adrenal glands and kidneys unremarkable. No renal stones or hydronephrosis. Cholecystectomy. No significant free fluid or adenopathy noted in the pelvis. No evidence for diverticulitis. Appendix not identified. Hysterectomy. No adnexal abnormality. Impression:      No acute process      This document has been electronically signed by: Josselin Staples MD on    03/11/2023 11:49 PM      All CTs at this facility use dose modulation techniques and iterative    reconstructions, and/or weight-based dosing   when appropriate to reduce radiation to a low as reasonably achievable. 3D Post-processing was performed on this study. CTA ABDOMEN PELVIS W WO CONTRAST   Final Result   Impression:      No acute vascular abnormality      __________________________________      CT angiogram abdomen and pelvis with contrast   Multiplanar reconstructions and 3D processing      Comparison: 04/05/2022      Findings:      Abdominal aorta normal in caliber without dissection. It is considerably tortuous with calcified plaques noted. Celiac, mesenteric and renal origins patent without stenosis. There are calcified plaques at the origins of all of these vessels. Age-indeterminate sclerotic moderate L4 compression fracture. Superior endplate is sclerotic, finding is likely chronic. L1 compression fracture is unchanged. Heterogeneous left lobe of liver without definite focal abnormality. This may be due to the arterial phase imaging for this study. No focal abnormalities liver or spleen. Hepatomegaly. Pancreas, adrenal glands and kidneys unremarkable. No renal stones or hydronephrosis. Cholecystectomy.       No significant free fluid or adenopathy noted in the pelvis. No evidence for diverticulitis. Appendix not identified. Hysterectomy. No adnexal abnormality. Impression:      No acute process      This document has been electronically signed by: Mahnaz Pacheco MD on    03/12/2023 12:09 AM      All CTs at this facility use dose modulation techniques and iterative    reconstructions, and/or weight-based dosing   when appropriate to reduce radiation to a low as reasonably achievable. 3D Post-processing was performed on this study.           LABS: (none if blank)  Labs Reviewed   BASIC METABOLIC PANEL - Abnormal; Notable for the following components:       Result Value    CO2 22 (*)     Glucose 109 (*)     BUN 26 (*)     All other components within normal limits   LIPASE - Abnormal; Notable for the following components:    Lipase 52.0 (*)     All other components within normal limits   BRAIN NATRIURETIC PEPTIDE - Abnormal; Notable for the following components:    Pro-BNP 1446.0 (*)     All other components within normal limits   BLOOD GAS, ARTERIAL - Abnormal; Notable for the following components:    pH, Blood Gas 7.55 (*)     PCO2 26 (*)     PO2 152 (*)     All other components within normal limits   CHLORIDE, WHOLE BLOOD - Abnormal; Notable for the following components:    Chloride, Whole Blood 111 (*)     All other components within normal limits   GLOMERULAR FILTRATION RATE, ESTIMATED - Abnormal; Notable for the following components:    Est, Glom Filt Rate 53 (*)     All other components within normal limits   CBC WITH AUTO DIFFERENTIAL - Abnormal; Notable for the following components:    RBC 4.08 (*)     MCH 33.3 (*)     RDW-SD 47.2 (*)     All other components within normal limits   HEPATIC FUNCTION PANEL   MAGNESIUM   TROPONIN   LACTIC ACID   POTASSIUM, WHOLE BLOOD   CALCIUM IONIZED SERUM   POC LACTIC ACID   SODIUM, WHOLE BLOOD   GLUCOSE, WHOLE BLOOD   ANION GAP   OSMOLALITY   URINALYSIS WITH REFLEX TO CULTURE   POCT CREATININE             Strict return precautions and follow up instructions were discussed with the patient prior to discharge, with which the patient agrees. MEDICATION CHANGES     Discharge Medication List as of 3/12/2023 12:18 AM        START taking these medications    Details   lidocaine (LIDODERM) 5 % Place 1 patch onto the skin daily for 7 days 12 hours on, 12 hours off., Disp-7 patch, R-0Normal      orphenadrine (NORFLEX) 100 MG extended release tablet Take 1 tablet by mouth 2 times daily for 5 days, Disp-10 tablet, R-0Normal      oxyCODONE-acetaminophen (PERCOCET) 5-325 MG per tablet Take 1 tablet by mouth every 6 hours as needed for Pain for up to 3 days. Intended supply: 3 days. Take lowest dose possible to manage pain Max Daily Amount: 4 tablets, Disp-6 tablet, R-0Print             FINAL IMPRESSION      1. Compression fracture of L4 lumbar vertebra, closed, initial encounter ([facility] [address]          FINAL DISPOSITION     Final diagnoses:   Compression fracture of L4 lumbar vertebra, closed, initial encounter ([facility] [address]     Condition: condition: stable  Dispo: Discharge to home      This transcription was electronically signed. Parts of this transcriptions may have been dictated by use of voice recognition software and electronically transcribed, and parts may have been transcribed with the assistance of an ED scribe. The transcription may contain errors not detected in proofreading. Please refer to my supervising physician's documentation if my documentation differs.     Electronically Signed: Ángela Wells MD, 03/12/23, 1:43 AM         Ángela Wells MD  Resident  03/12/23 [id number]

## 2023-03-12 NOTE — ED NOTES
Pt back from CT in stable condition. Vs reassessed. RR easy and unlabored.  Pt resting in bed with eyes closed and stable at this time     [facility]  03/11/23 5223

## 2023-03-12 NOTE — ED NOTES
Pt in CT and vomiting at this time. Dr. Otoniel Ash verbally ordered 4mg zofran.      Robe Price RN  03/11/23 5632

## 2023-03-12 NOTE — DISCHARGE INSTRUCTIONS
You can use Percocet as needed for pain, be careful as it can make you drowsy so do not use when driving or operating heavy machinery  You can use Norflex to help with the pain, be careful as it can make you drowsy so do not use when driving or operating heavy machinery  You can use lidocaine patches to help with the pain  Make sure to eat and drink plenty fluids stay well-hydrated  Call and follow-up with orthopedics ([facility]) regarding your compression fracture  Follow-up with your family doctor within a week

## 2023-03-14 ENCOUNTER — APPOINTMENT (OUTPATIENT)
Dept: GENERAL RADIOLOGY | Age: 73
DRG: 543 | End: 2023-03-14
Payer: MEDICARE

## 2023-03-14 ENCOUNTER — HOSPITAL ENCOUNTER (INPATIENT)
Age: 73
LOS: 4 days | Discharge: HOME | DRG: 543 | End: 2023-03-18
Attending: EMERGENCY MEDICINE | Admitting: ORTHOPAEDIC SURGERY
Payer: MEDICARE

## 2023-03-14 ENCOUNTER — APPOINTMENT (OUTPATIENT)
Dept: MRI IMAGING | Age: 73
DRG: 543 | End: 2023-03-14
Payer: MEDICARE

## 2023-03-14 DIAGNOSIS — M54.59 INTRACTABLE LOW BACK PAIN: ICD-10-CM

## 2023-03-14 DIAGNOSIS — S32.040S CLOSED COMPRESSION FRACTURE OF L4 LUMBAR VERTEBRA, SEQUELA: Primary | ICD-10-CM

## 2023-03-14 PROBLEM — S32.040G CLOSED COMPRESSION FRACTURE OF L4 LUMBAR VERTEBRA WITH DELAYED HEALING, SUBSEQUENT ENCOUNTER: Status: ACTIVE | Noted: 2023-03-14

## 2023-03-14 LAB
ALBUMIN SERPL BCG-MCNC: 4.1 G/DL (ref 3.5–5.1)
ALP SERPL-CCNC: 87 U/L (ref 38–126)
ALT SERPL W/O P-5'-P-CCNC: 31 U/L (ref 11–66)
ANION GAP SERPL CALC-SCNC: 14 MEQ/L (ref 8–16)
AST SERPL-CCNC: 36 U/L (ref 5–40)
BACTERIA URNS QL MICRO: ABNORMAL /HPF
BASOPHILS ABSOLUTE: 0 THOU/MM3 (ref 0–0.1)
BASOPHILS NFR BLD AUTO: 0.6 %
BILIRUB SERPL-MCNC: 0.7 MG/DL (ref 0.3–1.2)
BILIRUB UR QL STRIP.AUTO: NEGATIVE
BUN SERPL-MCNC: 15 MG/DL (ref 7–22)
CALCIUM SERPL-MCNC: 9.2 MG/DL (ref 8.5–10.5)
CASTS #/AREA URNS LPF: ABNORMAL /LPF
CASTS 2: ABNORMAL /LPF
CHARACTER UR: CLEAR
CHLORIDE SERPL-SCNC: 105 MEQ/L (ref 98–111)
CO2 SERPL-SCNC: 23 MEQ/L (ref 23–33)
COLOR: YELLOW
CREAT SERPL-MCNC: 1 MG/DL (ref 0.4–1.2)
CRYSTALS URNS MICRO: ABNORMAL
DEPRECATED RDW RBC AUTO: 47.8 FL (ref 35–45)
EOSINOPHIL NFR BLD AUTO: 2.6 %
EOSINOPHILS ABSOLUTE: 0.1 THOU/MM3 (ref 0–0.4)
EPITHELIAL CELLS, UA: ABNORMAL /HPF
ERYTHROCYTE [DISTWIDTH] IN BLOOD BY AUTOMATED COUNT: 13 % (ref 11.5–14.5)
GFR SERPL CREATININE-BSD FRML MDRD: 60 ML/MIN/1.73M2
GLUCOSE SERPL-MCNC: 104 MG/DL (ref 70–108)
GLUCOSE UR QL STRIP.AUTO: NEGATIVE MG/DL
HCT VFR BLD AUTO: 40.9 % (ref 37–47)
HGB BLD-MCNC: 13.6 GM/DL (ref 12–16)
HGB UR QL STRIP.AUTO: ABNORMAL
IMM GRANULOCYTES # BLD AUTO: 0.02 THOU/MM3 (ref 0–0.07)
IMM GRANULOCYTES NFR BLD AUTO: 0.4 %
KETONES UR QL STRIP.AUTO: ABNORMAL
LIPASE SERPL-CCNC: 271.8 U/L (ref 5.6–51.3)
LYMPHOCYTES ABSOLUTE: 1.1 THOU/MM3 (ref 1–4.8)
LYMPHOCYTES NFR BLD AUTO: 20 %
MCH RBC QN AUTO: 33.1 PG (ref 26–33)
MCHC RBC AUTO-ENTMCNC: 33.3 GM/DL (ref 32.2–35.5)
MCV RBC AUTO: 99.5 FL (ref 81–99)
MISCELLANEOUS 2: ABNORMAL
MONOCYTES ABSOLUTE: 0.5 THOU/MM3 (ref 0.4–1.3)
MONOCYTES NFR BLD AUTO: 8.4 %
NEUTROPHILS NFR BLD AUTO: 68 %
NITRITE UR QL STRIP: NEGATIVE
NRBC BLD AUTO-RTO: 0 /100 WBC
OSMOLALITY SERPL CALC.SUM OF ELEC: 284.3 MOSMOL/KG (ref 275–300)
PH UR STRIP.AUTO: 5.5 [PH] (ref 5–9)
PLATELET # BLD AUTO: 134 THOU/MM3 (ref 130–400)
PMV BLD AUTO: 9.9 FL (ref 9.4–12.4)
POTASSIUM SERPL-SCNC: 4.5 MEQ/L (ref 3.5–5.2)
PROT SERPL-MCNC: 6.5 G/DL (ref 6.1–8)
PROT UR STRIP.AUTO-MCNC: NEGATIVE MG/DL
RBC # BLD AUTO: 4.11 MILL/MM3 (ref 4.2–5.4)
RBC URINE: ABNORMAL /HPF
RENAL EPI CELLS #/AREA URNS HPF: ABNORMAL /[HPF]
SEGMENTED NEUTROPHILS ABSOLUTE COUNT: 3.7 THOU/MM3 (ref 1.8–7.7)
SODIUM SERPL-SCNC: 142 MEQ/L (ref 135–145)
SP GR UR REFRACT.AUTO: 1.01 (ref 1–1.03)
TROPONIN T: < 0.01 NG/ML
UROBILINOGEN, URINE: 0.2 EU/DL (ref 0–1)
WBC # BLD AUTO: 5.5 THOU/MM3 (ref 4.8–10.8)
WBC #/AREA URNS HPF: ABNORMAL /HPF
WBC #/AREA URNS HPF: ABNORMAL /[HPF]
YEAST LIKE FUNGI URNS QL MICRO: ABNORMAL

## 2023-03-14 PROCEDURE — 6360000002 HC RX W HCPCS: Performed by: STUDENT IN AN ORGANIZED HEALTH CARE EDUCATION/TRAINING PROGRAM

## 2023-03-14 PROCEDURE — 99285 EMERGENCY DEPT VISIT HI MDM: CPT

## 2023-03-14 PROCEDURE — 2580000003 HC RX 258: Performed by: STUDENT IN AN ORGANIZED HEALTH CARE EDUCATION/TRAINING PROGRAM

## 2023-03-14 PROCEDURE — 74018 RADEX ABDOMEN 1 VIEW: CPT

## 2023-03-14 PROCEDURE — 6370000000 HC RX 637 (ALT 250 FOR IP): Performed by: STUDENT IN AN ORGANIZED HEALTH CARE EDUCATION/TRAINING PROGRAM

## 2023-03-14 PROCEDURE — 6360000002 HC RX W HCPCS: Performed by: PHYSICIAN ASSISTANT

## 2023-03-14 PROCEDURE — 80053 COMPREHEN METABOLIC PANEL: CPT

## 2023-03-14 PROCEDURE — 84484 ASSAY OF TROPONIN QUANT: CPT

## 2023-03-14 PROCEDURE — 71045 X-RAY EXAM CHEST 1 VIEW: CPT

## 2023-03-14 PROCEDURE — 36415 COLL VENOUS BLD VENIPUNCTURE: CPT

## 2023-03-14 PROCEDURE — 81001 URINALYSIS AUTO W/SCOPE: CPT

## 2023-03-14 PROCEDURE — 2580000003 HC RX 258: Performed by: PHYSICIAN ASSISTANT

## 2023-03-14 PROCEDURE — 85025 COMPLETE CBC W/AUTO DIFF WBC: CPT

## 2023-03-14 PROCEDURE — 87086 URINE CULTURE/COLONY COUNT: CPT

## 2023-03-14 PROCEDURE — 93005 ELECTROCARDIOGRAM TRACING: CPT | Performed by: STUDENT IN AN ORGANIZED HEALTH CARE EDUCATION/TRAINING PROGRAM

## 2023-03-14 PROCEDURE — 83690 ASSAY OF LIPASE: CPT

## 2023-03-14 PROCEDURE — 96374 THER/PROPH/DIAG INJ IV PUSH: CPT

## 2023-03-14 PROCEDURE — 1200000003 HC TELEMETRY R&B

## 2023-03-14 RX ORDER — SODIUM CHLORIDE 0.9 % (FLUSH) 0.9 %
5-40 SYRINGE (ML) INJECTION EVERY 12 HOURS SCHEDULED
Status: DISCONTINUED | OUTPATIENT
Start: 2023-03-14 | End: 2023-03-18 | Stop reason: HOSPADM

## 2023-03-14 RX ORDER — SODIUM CHLORIDE 9 MG/ML
INJECTION, SOLUTION INTRAVENOUS PRN
Status: DISCONTINUED | OUTPATIENT
Start: 2023-03-14 | End: 2023-03-18 | Stop reason: HOSPADM

## 2023-03-14 RX ORDER — ASPIRIN 81 MG/1
81 TABLET ORAL DAILY
Status: DISCONTINUED | OUTPATIENT
Start: 2023-03-14 | End: 2023-03-18 | Stop reason: HOSPADM

## 2023-03-14 RX ORDER — HYDROCODONE BITARTRATE AND ACETAMINOPHEN 5; 325 MG/1; MG/1
2 TABLET ORAL EVERY 4 HOURS PRN
Status: DISCONTINUED | OUTPATIENT
Start: 2023-03-14 | End: 2023-03-18 | Stop reason: HOSPADM

## 2023-03-14 RX ORDER — SODIUM CHLORIDE 9 MG/ML
INJECTION, SOLUTION INTRAVENOUS CONTINUOUS
Status: DISCONTINUED | OUTPATIENT
Start: 2023-03-14 | End: 2023-03-17

## 2023-03-14 RX ORDER — SODIUM CHLORIDE 0.9 % (FLUSH) 0.9 %
5-40 SYRINGE (ML) INJECTION PRN
Status: DISCONTINUED | OUTPATIENT
Start: 2023-03-14 | End: 2023-03-18 | Stop reason: HOSPADM

## 2023-03-14 RX ORDER — LEVOTHYROXINE SODIUM 0.03 MG/1
25 TABLET ORAL DAILY
Status: DISCONTINUED | OUTPATIENT
Start: 2023-03-15 | End: 2023-03-18 | Stop reason: HOSPADM

## 2023-03-14 RX ORDER — POLYETHYLENE GLYCOL 3350 17 G/17G
17 POWDER, FOR SOLUTION ORAL DAILY PRN
Status: DISCONTINUED | OUTPATIENT
Start: 2023-03-14 | End: 2023-03-18 | Stop reason: HOSPADM

## 2023-03-14 RX ORDER — HYDROCODONE BITARTRATE AND ACETAMINOPHEN 5; 325 MG/1; MG/1
1 TABLET ORAL EVERY 4 HOURS PRN
Status: DISCONTINUED | OUTPATIENT
Start: 2023-03-14 | End: 2023-03-18 | Stop reason: HOSPADM

## 2023-03-14 RX ORDER — ONDANSETRON 2 MG/ML
4 INJECTION INTRAMUSCULAR; INTRAVENOUS EVERY 6 HOURS PRN
Status: DISCONTINUED | OUTPATIENT
Start: 2023-03-14 | End: 2023-03-18 | Stop reason: HOSPADM

## 2023-03-14 RX ORDER — ONDANSETRON 4 MG/1
4 TABLET, ORALLY DISINTEGRATING ORAL EVERY 8 HOURS PRN
Status: DISCONTINUED | OUTPATIENT
Start: 2023-03-14 | End: 2023-03-18 | Stop reason: HOSPADM

## 2023-03-14 RX ORDER — MORPHINE SULFATE 2 MG/ML
2 INJECTION, SOLUTION INTRAMUSCULAR; INTRAVENOUS ONCE
Status: COMPLETED | OUTPATIENT
Start: 2023-03-14 | End: 2023-03-14

## 2023-03-14 RX ORDER — PANTOPRAZOLE SODIUM 40 MG/1
40 TABLET, DELAYED RELEASE ORAL DAILY
Status: DISCONTINUED | OUTPATIENT
Start: 2023-03-15 | End: 2023-03-18 | Stop reason: HOSPADM

## 2023-03-14 RX ORDER — MORPHINE SULFATE 4 MG/ML
4 INJECTION, SOLUTION INTRAMUSCULAR; INTRAVENOUS
Status: DISCONTINUED | OUTPATIENT
Start: 2023-03-14 | End: 2023-03-18 | Stop reason: HOSPADM

## 2023-03-14 RX ORDER — SODIUM CHLORIDE, SODIUM LACTATE, POTASSIUM CHLORIDE, CALCIUM CHLORIDE 600; 310; 30; 20 MG/100ML; MG/100ML; MG/100ML; MG/100ML
INJECTION, SOLUTION INTRAVENOUS CONTINUOUS
Status: DISCONTINUED | OUTPATIENT
Start: 2023-03-14 | End: 2023-03-18

## 2023-03-14 RX ORDER — MORPHINE SULFATE 2 MG/ML
2 INJECTION, SOLUTION INTRAMUSCULAR; INTRAVENOUS
Status: DISCONTINUED | OUTPATIENT
Start: 2023-03-14 | End: 2023-03-18 | Stop reason: HOSPADM

## 2023-03-14 RX ORDER — NITROGLYCERIN 0.4 MG/1
0.4 TABLET SUBLINGUAL EVERY 5 MIN PRN
Status: DISCONTINUED | OUTPATIENT
Start: 2023-03-14 | End: 2023-03-18 | Stop reason: HOSPADM

## 2023-03-14 RX ORDER — CYCLOBENZAPRINE HCL 10 MG
10 TABLET ORAL 3 TIMES DAILY PRN
Status: DISCONTINUED | OUTPATIENT
Start: 2023-03-14 | End: 2023-03-18 | Stop reason: HOSPADM

## 2023-03-14 RX ADMIN — MORPHINE SULFATE 4 MG: 4 INJECTION, SOLUTION INTRAMUSCULAR; INTRAVENOUS at 17:49

## 2023-03-14 RX ADMIN — MORPHINE SULFATE 2 MG: 2 INJECTION, SOLUTION INTRAMUSCULAR; INTRAVENOUS at 14:45

## 2023-03-14 RX ADMIN — SODIUM CHLORIDE, PRESERVATIVE FREE 10 ML: 5 INJECTION INTRAVENOUS at 22:54

## 2023-03-14 RX ADMIN — SODIUM CHLORIDE: 9 INJECTION, SOLUTION INTRAVENOUS at 19:01

## 2023-03-14 RX ADMIN — SODIUM CHLORIDE, POTASSIUM CHLORIDE, SODIUM LACTATE AND CALCIUM CHLORIDE: 600; 310; 30; 20 INJECTION, SOLUTION INTRAVENOUS at 20:27

## 2023-03-14 RX ADMIN — MORPHINE SULFATE 4 MG: 4 INJECTION, SOLUTION INTRAMUSCULAR; INTRAVENOUS at 22:54

## 2023-03-14 RX ADMIN — METOPROLOL TARTRATE 25 MG: 25 TABLET, FILM COATED ORAL at 22:54

## 2023-03-14 ASSESSMENT — PAIN DESCRIPTION - LOCATION
LOCATION: BACK

## 2023-03-14 ASSESSMENT — PAIN SCALES - GENERAL
PAINLEVEL_OUTOF10: 8
PAINLEVEL_OUTOF10: 8
PAINLEVEL_OUTOF10: 9
PAINLEVEL_OUTOF10: 10
PAINLEVEL_OUTOF10: 8
PAINLEVEL_OUTOF10: 9
PAINLEVEL_OUTOF10: 8

## 2023-03-14 ASSESSMENT — PAIN DESCRIPTION - DESCRIPTORS
DESCRIPTORS: SHARP
DESCRIPTORS: ACHING;DISCOMFORT
DESCRIPTORS: ACHING

## 2023-03-14 ASSESSMENT — PAIN - FUNCTIONAL ASSESSMENT
PAIN_FUNCTIONAL_ASSESSMENT: ACTIVITIES ARE NOT PREVENTED
PAIN_FUNCTIONAL_ASSESSMENT: 0-10
PAIN_FUNCTIONAL_ASSESSMENT: 0-10
PAIN_FUNCTIONAL_ASSESSMENT: PREVENTS OR INTERFERES SOME ACTIVE ACTIVITIES AND ADLS
PAIN_FUNCTIONAL_ASSESSMENT: 0-10

## 2023-03-14 ASSESSMENT — PAIN DESCRIPTION - PAIN TYPE
TYPE: CHRONIC PAIN
TYPE: ACUTE PAIN

## 2023-03-14 ASSESSMENT — PAIN DESCRIPTION - FREQUENCY
FREQUENCY: INTERMITTENT
FREQUENCY: CONTINUOUS

## 2023-03-14 ASSESSMENT — PAIN DESCRIPTION - ONSET
ONSET: GRADUAL
ONSET: ON-GOING

## 2023-03-14 ASSESSMENT — PAIN DESCRIPTION - ORIENTATION
ORIENTATION: LOWER
ORIENTATION: LOWER;UPPER
ORIENTATION: LOWER

## 2023-03-14 NOTE — ED TRIAGE NOTES
Patient presents to the ed with c/o back pain. Pt states that she was recently seen here in the ed and referred to [facility].pt states that she has an appt on Thursday but the NP told her to come in to day for MRI. Pt rating 8/10 pain at this time.

## 2023-03-14 NOTE — ED NOTES
Patient resting in bed at this time, Dr. Sebastian Ulloa at bedside, pt denies any needs, call light within reach.       Juvenal Cast RN  03/14/23 0807

## 2023-03-14 NOTE — CONSULTS
Hospitalist Consult Note          Patient:  Bo Pitts  YOB: 1950    MRN: [id number]     Acct: [de-identified]    PCP: OLEG Purcell CNP    Date of Admission: 3/14/2023    Date of Service: Pt seen/examined on 03/14/23      Chief Complaint:  consulted for medical management    ASSESSMENT/PLAN:    Back Pain: L4 and L1 compression fractures noted on CT. Elevated lipase 3X ULN concerning for acute pancreatitis  -Dr Weiner Orjohana following  -MRI spine pending  -possible upcoming kyphoplasty  -Holding Eliquis/ASA  -advance diet as tolerated  Osteoporosis: History of multiple compression fractures in her thoracic spine that were treated with kyphoplasty by Dr. Regine Guerrero  Suspected Pancreatitis: Lipase 271 with post prandial N/v and abdominal pain  -MRCP pending  -advance diet as tolerated  -aggressive IVF  -pain control per primary service  Pre-operative CV exam  -Possible upcoming possible kyphoplasty, unclear date  (low risk surgery)  -will hold Eliquis  -No active cardiac complaints.    -Last ECG reviewed showing NSR  -Last TTE 7/26/17 reviewed showing EF 65% no valvular disease  -No prior LHC on file   -RCRI = 1 (ASA class II)  -Her background risk factors including atrial fibrillation and TAA increase her overall risk to intermediate, and the surgery itself is low risk risk.   -Able to do > 4 METS. -Patient accepts the risk of the planned procedure and understands the possibility of dwayne-operative cardiac event, including but not limited to MI, VT/VF, cardiac arrest, and death, and wishes to proceed with the procedure.  -No further cardiac testing prior to upcoming surgery. TAA: stable 4.1cm  Paroxysmal atrial fibrillation: [id number] 3 OAC on Eliquis. Rate controlled with metoprolol. Holding for possible intervention  Hypothyroidism: Synthroid 25mcg    History Of Present Illness:    Bo Pitts is a 67 y.o. female who presents to [facility] ED 3/14/2023 with back pain.  Patient is a former smoker with a PMH significant for multiple back surgeries w/ previous fractures and kyphoplasty and PAF. She follows with Dr Mamadou Jacobson    Patient reports that she began having back pain on 3/11/2023 that began after eating pizza. She reports her pain began radiating to her abdomen and she had associated nausea and vomiting for the last 2 days. She has been unable to keep any meals down. Of note patient did have previously an ERCP with Dr. Rosas Mosher here at [facility] due to complications with the procedure she was sent to [facility] for revision following biliary duct stenting. In addition patient does have history of multiple compression fractures in her thoracic spine which were treated with kyphoplasty. Of note patient was seen for similar complaint of abdominal pain that began after eating dinner that evening on 3/11/2023. She did have nonbilious emesis and stated that her pain radiated to her back. There was an age-indeterminate L4 compression fracture noted at that time this was noted in addition to an existing L1 compression fracture. There was otherwise no acute process noted on imaging. Patient went home and has noticed that her pain has gotten much worse with movement particularly flexion and extension. She denies any associated saddle anesthesia, urinary incontinence or bowel incontinence. Patient is a FULL code. ED Course:  Patient vitals on arrival T97 RR 18 HR 85 /83 SPO2 96% on room air. Labs show elevated lipase at 271.8. CBC, LFTs, BMP WNL. XR showed mild ectatic tortuous descending TAA. Patient was seen and evaluated by ortho/spine.  Hospitalist services consulted for medical management    Past Medical History:          Diagnosis Date    Hyperlipidemia     IBS (irritable bowel syndrome)     Senile osteoporosis        Past Surgical History:          Procedure Laterality Date    BACK SURGERY  2015    CATARACT REMOVAL Right 01/2020    CATARACT REMOVAL Left 02/2020 CHOLECYSTECTOMY, LAPAROSCOPIC N/A 7/24/2017    CHOLECYSTECTOMY LAPAROSCOPIC performed by Ibis Kulkarni MD at [facility]  08, 13, 2018    ERCP  7/28/2017    ERCP STENT INSERTION performed by Reyes Rm MD at [facility]    ERCP Left 7/28/2017    ERCP ENDOSCOPIC RETROGRADE CHOLANGIOPANCREATOGRAPHY performed by Reyes Rm MD at [facility]    ERCP N/A 9/26/2017    ERCP  performed by Reyes Rm MD at [facility]  2019    banding    HYSTERECTOMY (CERVIX STATUS UNKNOWN)  1984    JOSETTE BSO    KNEE ARTHROSCOPY Left 2014    CASIMIRO STEROTACTIC LOC BREAST BIOPSY RIGHT Right 05/16/2006    fibrocystic changes    OVARY REMOVAL Bilateral     1984    TONSILLECTOMY      as a child    VEIN SURGERY Bilateral 2016    legs       Medications Prior to Admission:      Prior to Admission medications    Medication Sig Start Date End Date Taking? Authorizing Provider   lidocaine (LIDODERM) 5 % Place 1 patch onto the skin daily for 7 days 12 hours on, 12 hours off. 3/12/23 3/19/23  Re Quintero MD   orphenadrine (NORFLEX) 100 MG extended release tablet Take 1 tablet by mouth 2 times daily for 5 days 3/12/23 3/17/23  Re Quintero MD   oxyCODONE-acetaminophen (PERCOCET) 5-325 MG per tablet Take 1 tablet by mouth every 6 hours as needed for Pain for up to 3 days. Intended supply: 3 days.  Take lowest dose possible to manage pain Max Daily Amount: 4 tablets 3/12/23 3/15/23  Winnie Luciano DO   levothyroxine (SYNTHROID) 25 MCG tablet Take 25 mcg by mouth Daily    Historical Provider, MD   fluticasone (FLONASE) 50 MCG/ACT nasal spray 1 spray by Each Nostril route daily    Historical Provider, MD   pantoprazole (PROTONIX) 40 MG tablet Take 40 mg by mouth daily    Historical Provider, MD   cyclobenzaprine (FLEXERIL) 10 MG tablet Take 10 mg by mouth 3 times daily as needed for Muscle spasms    Historical Provider, MD   rosuvastatin (CRESTOR) 20 MG tablet Take 1 tablet by mouth daily 3/2/23   [address], MD   metoprolol tartrate (LOPRESSOR) 25 MG tablet TAKE 1 TABLET TWICE A DAY 3/2/23   [address], MD   apixaban (ELIQUIS) 5 MG TABS tablet TAKE 1 TABLET TWICE A DAY 3/2/23   [address], MD   denosumab (PROLIA) 60 MG/ML SOSY SC injection Inject 60 mg into the skin once 2x year    Historical Provider, MD   Psyllium (METAMUCIL FIBER PO) Take 1 Scoop by mouth daily    Historical Provider, MD   aspirin 81 MG tablet Take 81 mg by mouth daily    Historical Provider, MD   nitroGLYCERIN (NITROSTAT) 0.4 MG SL tablet Place 1 tablet under the tongue every 5 minutes as needed for Chest pain 11/28/18   [address], MD   Calcium Citrate-Vitamin D 500-500 MG-UNIT CHEW Take 1 tablet by mouth 2 times daily     Historical Provider, MD       Allergies:  Amitiza [lubiprostone], Bentyl [dicyclomine hcl], Lipitor [atorvastatin], and Soma [carisoprodol]    Social History:      The patient currently lives at home    TOBACCO:   reports that she quit smoking about 45 years ago. She has never used smokeless tobacco.  ETOH:   reports current alcohol use. Family History:      Reviewed in detail and negative for DM, CAD, Cancer, CVA. Positive as follows:        Problem Relation Age of Onset    Stroke Mother     Heart Disease Mother     Stroke Father     Cancer Brother 5        leukemia    Breast Cancer Daughter 52       Diet:  No diet orders on file    REVIEW OF SYSTEMS:   Pertinent positives as noted in the HPI. All other systems reviewed and negative. PHYSICAL EXAM:    /61   Pulse 83   Temp 97 °F (36.1 °C)   Resp 18   Ht 5' 9\" (1.753 m)   Wt 205 lb (93 kg)   SpO2 99%   BMI 30.27 kg/m²     General appearance: Elderly white female No apparent distress, appears stated age and cooperative. HEENT:  Normal cephalic, atraumatic without obvious deformity. Pupils equal, round, and reactive to light. Extra ocular muscles intact. Conjunctivae/corneas clear.   Neck: Supple, with full range of motion. No jugular venous distention. Trachea midline. Respiratory:  Normal respiratory effort. Clear to auscultation, bilaterally without Rales/Wheezes/Rhonchi. Cardiovascular:  Regular rate and rhythm with normal S1/S2 without murmurs, rubs, gallops, or ectopic beat  Abdomen: mild LUQ tenderness, soft non-distended with normal bowel sounds. Musculoskeletal: bony point tenderness over lower back. Bilateral lower extremity edema No clubbing, cyanosis. Skin: Skin color, texture, turgor normal.  No rashes or lesions. Neurologic:  Cranial nerves: II-XII intact. DTRs 2/4 and symmetric, sensation intact to fine touch and muscle strength 5/5 throughout   Psychiatric:  Alert and oriented, thought content appropriate, normal insight  Capillary Refill: Brisk,< 3 seconds   Peripheral Pulses: +3 palpable, equal bilaterally       Labs:     Recent Labs     03/11/23 2233 03/14/23  1441   WBC 4.9 5.5   HGB 13.6 13.6   HCT 39.5 40.9    134     Recent Labs     03/11/23 2233 03/11/23 2237 03/14/23  1441    144 142   K 4.2 4.1 4.5     --  105   CO2 22*  --  23   BUN 26*  --  15   CREATININE 1.1  --  1.0   CALCIUM 9.3  --  9.2     Recent Labs     03/11/23 2233 03/14/23  1441   AST 28 36   ALT 15 31   BILIDIR <0.2  --    BILITOT 0.7 0.7   ALKPHOS 67 87     No results for input(s): INR in the last 72 hours. No results for input(s): [place] in the last 72 hours. Urinalysis:      Lab Results   Component Value Date/Time    NITRU NEGATIVE 08/12/2015 09:20 AM    WBCUA 15-25 08/12/2015 09:20 AM    BACTERIA NONE 08/12/2015 09:20 AM    RBCUA 3-5 08/12/2015 09:20 AM    BLOODU NEGATIVE 08/12/2015 09:20 AM    GLUCOSEU NEGATIVE 08/12/2015 09:20 AM       Intake & Output:  No intake/output data recorded. No intake/output data recorded. Radiology:   XR ABDOMEN (KUB) (SINGLE AP VIEW)   Final Result   1. Nonobstructive bowel gas pattern.    2. Large amount of retained stool in the colon which may represent constipation. **This report has been created using voice recognition software. It may contain minor errors which are inherent in voice recognition technology. **      Final report electronically signed by Dr Daniel Senior on 3/14/2023 4:04 PM      XR CHEST PORTABLE    (Results Pending)   MRI LUMBAR SPINE WO CONTRAST    (Results Pending)           XR ABDOMEN (KUB) (SINGLE AP VIEW)   Final Result   1. Nonobstructive bowel gas pattern. 2. Large amount of retained stool in the colon which may represent constipation. **This report has been created using voice recognition software. It may contain minor errors which are inherent in voice recognition technology. **      Final report electronically signed by Dr Daniel Senior on 3/14/2023 4:04 PM      XR CHEST PORTABLE    (Results Pending)   MRI LUMBAR SPINE [address]    (Results Pending)        DVT prophylaxis: SCD's    Code Status: Full Code      PT/OT Eval Status: following surgery    Disposition:Home      Thank you OLEG Aquino - YOU for the opportunity to be involved in this patient's care.     Electronically signed by Giles Essex, DO PGY-3 on 3/14/2023 at 4:09 PM

## 2023-03-14 NOTE — ED NOTES
ED to inpatient nurses report    Chief Complaint   Patient presents with    Back Pain      Present to ED from home  LOC: alert and orientated to name, place, date  Vital signs   Vitals:    03/14/23 1321 03/14/23 1322 03/14/23 1441 03/14/23 1525   BP: (!) 145/83  129/67 130/61   Pulse: 85  81 83   Resp: 18  18 18   Temp:  97 °F (36.1 °C)     TempSrc: Oral      SpO2: 96%  97% 99%   Weight: 205 lb (93 kg)      Height: 5' 9\" (1.753 m)         Oxygen Baseline 98    Current needs required room air Bipap/Cpap No  LDAs:   Peripheral IV 03/14/23 Right (Active)   Site Assessment Clean, dry & intact 03/14/23 1525   Line Status Normal saline locked; Capped 03/14/23 1525     Mobility: Independent  Pending ED orders: none  Present condition: stable     C-SSRS Risk of Suicide: No Risk  Swallow Screening    Preferred Language: [place]     Electronically signed by Colton Cruz RN on 3/14/2023 at 3:57 PM       Colton Cruz RN  03/14/23 [id number]

## 2023-03-14 NOTE — ED NOTES
Pt resting in bed with family at bedside, pt denies any needs, updated on poc.       Willie Hancock RN  03/14/23 8796

## 2023-03-14 NOTE — ED PROVIDER NOTES
[facility] ENCOUN[facility]          Pt Name: Pablo Lynn  MRN: [id number]  [place] 1950  Date of evaluation: 3/14/2023  Emergency Physician: Franklin Hernández DO  Supervising Physician: Dr. Negron All       Chief Complaint   Patient presents with    Back Pain     History obtained from the patient. HISTORY OF PRESENT ILLNESS    HPI  Pablo Lynn is a 67 y.o. female who presents to the emergency department for evaluation of back pain. The patient states that 4 days ago she was eating pizza, developed a stomachache that wrapped around to her back, which was characterized as \"stabbing,\" and she had fecal urgency. She went to go to the bathroom and then developed back pain which prompted her to go to the emergency department. She had a CTA of her chest abdomen and pelvis which revealed an L4 compression fracture. She has been taking Percocet at home with some improvement of her symptoms. She reports her pain is worse with movement and better with rest.  She has not yet followed up with Dr. Uri Thomas office, but they called the office today because she was having intractable back pain, and they told her to go to the emergency department and potentially have an MRI. The patient denies red flag back pain symptoms of fever, saddle anesthesia, and bowel or bladder incontinence. The patient has no other acute complaints at this time.       REVIEW OF SYSTEMS   Review of Systems      PAST MEDICAL AND SURGICAL HISTORY     Past Medical History:   Diagnosis Date    Hyperlipidemia     IBS (irritable bowel syndrome)     Senile osteoporosis      Past Surgical History:   Procedure Laterality Date    BACK SURGERY  2015    CATARACT REMOVAL Right 01/2020    CATARACT REMOVAL Left 02/2020    CHOLECYSTECTOMY, LAPAROSCOPIC N/A 7/24/2017    CHOLECYSTECTOMY LAPAROSCOPIC performed by Moriah Ho MD at [facility] 36  08, 13, 2018    ERCP 7/28/2017    ERCP STENT INSERTION performed by Gail Lopez MD at [facility]    ERCP Left 7/28/2017    ERCP ENDOSCOPIC RETROGRADE CHOLANGIOPANCREATOGRAPHY performed by Gail Lopez MD at [facility]    ERCP N/A 9/26/2017    ERCP  performed by Gail Lopez MD at [facility] 59  2019    banding    HYSTERECTOMY (CERVIX STATUS UNKNOWN)  1984    JOSETTE BSO    KNEE ARTHROSCOPY Left 2014    CASIMIRO STEROTACTIC LOC BREAST BIOPSY RIGHT Right 05/16/2006    fibrocystic changes    OVARY REMOVAL Bilateral     1984    TONSILLECTOMY      as a child    VEIN SURGERY Bilateral 2016    legs         MEDICATIONS     Current Facility-Administered Medications:     0.9 % sodium chloride infusion, , IntraVENous, Continuous, Archie Lot, PA-C, Stopped at 03/14/23 2026    sodium chloride flush 0.9 % injection 5-40 mL, 5-40 mL, IntraVENous, 2 times per day, Archie Lot, PA-C    sodium chloride flush 0.9 % injection 5-40 mL, 5-40 mL, IntraVENous, PRN, Archie Lot, PA-C    0.9 % sodium chloride infusion, , IntraVENous, PRN, Archie Lot, PA-C    ondansetron (ZOFRAN-ODT) disintegrating tablet 4 mg, 4 mg, Oral, Q8H PRN **OR** ondansetron (ZOFRAN) injection 4 mg, 4 mg, IntraVENous, Q6H PRN, Archie Lot, PA-C    polyethylene glycol (GLYCOLAX) packet 17 g, 17 g, Oral, Daily PRN, Archie Lot, PA-C    morphine (PF) injection 2 mg, 2 mg, IntraVENous, Q2H PRN **OR** morphine injection 4 mg, 4 mg, IntraVENous, Q2H PRN, Archie Lot, PA-C, 4 mg at 03/14/23 1749    HYDROcodone-acetaminophen (NORCO) 5-325 MG per tablet 1 tablet, 1 tablet, Oral, Q4H PRN **OR** HYDROcodone-acetaminophen (NORCO) 5-325 MG per tablet 2 tablet, 2 tablet, Oral, Q4H PRN, Archie Lot, PA-C    cyclobenzaprine (FLEXERIL) tablet 10 mg, 10 mg, Oral, TID PRN, Archie Lot, PA-C    [Held by provider] apixaban (ELIQUIS) tablet 5 mg, 5 mg, Oral, BID, Shey Tamayo DO    [Held by provider] aspirin EC tablet 81 mg, 81 mg, Oral, Daily, Niya Pillai JAMES Tamayo DO    [START ON 3/15/2023] levothyroxine (SYNTHROID) tablet 25 mcg, 25 mcg, Oral, Daily, Jaya Tamayo DO    metoprolol tartrate (LOPRESSOR) tablet 25 mg, 25 mg, Oral, BID, Iris Sasha Tamayo DO    [START ON 3/15/2023] pantoprazole (PROTONIX) tablet 40 mg, 40 mg, Oral, Daily, Jaya Tamayo DO    nitroGLYCERIN (NITROSTAT) SL tablet 0.4 mg, 0.4 mg, SubLINGual, Q5 Min PRN, Jaya Tamayo DO    lactated ringers IV soln infusion, , IntraVENous, Continuous, Jaya Tamayo DO, Last Rate: 200 mL/hr at 23, New Bag at 23      SOCIAL HISTORY     Social History     Social History Narrative    Not on file     Social History     Tobacco Use    Smoking status: Former     Types: Cigarettes     Quit date: 1978     Years since quittin.2    Smokeless tobacco: Never   Substance Use Topics    Alcohol use: Yes     Comment: rarely    Drug use: No         ALLERGIES     Allergies   Allergen Reactions    Amitiza [Lubiprostone] Diarrhea    Bentyl [Dicyclomine Hcl]      Severe spasms    Lipitor [Atorvastatin]      Muscle aches    Soma [Carisoprodol] Nausea And Vomiting         FAMILY HISTORY     Family History   Problem Relation Age of Onset    Stroke Mother     Heart Disease Mother     Stroke Father     Cancer Brother 5        leukemia    Breast Cancer Daughter 52         PHYSICAL EXAM     ED Triage Vitals   BP Temp Temp Source Heart Rate Resp SpO2 Height Weight   23 1321 23 1322 23 1321 23 1321 23 1321 23 1321 23 1321 23 1321   (!) 145/83 97 °F (36.1 °C) Oral 85 18 96 % 5' 9\" (1.753 m) 205 lb (93 kg)         Additional Vital Signs:  Vitals:    23   BP: (!) 119/55   Pulse: 64   Resp: 18   Temp: 98.2 °F (36.8 °C)   SpO2:        Physical Exam  Vitals and nursing note reviewed. Constitutional:       General: She is not in acute distress. Appearance: Normal appearance. She is not ill-appearing.    HENT:      Head: Normocephalic and atraumatic. Nose: Nose normal. No congestion or rhinorrhea. Mouth/Throat:      Mouth: Mucous membranes are moist.      Pharynx: Oropharynx is clear. No oropharyngeal exudate or posterior oropharyngeal erythema. Eyes:      Extraocular Movements: Extraocular movements intact. Pupils: Pupils are equal, round, and reactive to light. Cardiovascular:      Rate and Rhythm: Normal rate and regular rhythm. Heart sounds: Normal heart sounds. Pulmonary:      Effort: Pulmonary effort is normal.      Breath sounds: Normal breath sounds. Abdominal:      General: Abdomen is flat. There is no distension. Palpations: Abdomen is soft. Tenderness: There is abdominal tenderness. Comments: Left upper quadrant TTP without peritoneal signs. Musculoskeletal:         General: Tenderness present. No swelling. Normal range of motion. Cervical back: Normal range of motion. Right lower leg: Edema present. Left lower leg: Edema present. Comments: Lower middle lumbar spine TTP in the area of approximately L4. Bilateral lower extremity nonpitting edema consistent with lymphedema. Skin:     General: Skin is warm and dry. Neurological:      General: No focal deficit present. Mental Status: She is alert and oriented to person, place, and time. Mental status is at baseline. INITIAL IMPRESSION, DIFFERENTIAL DIAGNOSIS, AND PLAN   Initial Assessment: Given the patient's above chief complaint and findings on history and physical examination, I thought it was appropriate to consider the following emergency medical conditions: Anemia, electrolyte abnormality, ACS, compression spine fracture, unlikely spinal cord compression syndrome, hepatitis, pancreatitis, BLANKA, UTI. Although some of these diagnoses are unlikely they were considered in my medical decision making.     Chronic Conditions considered:   Patient Active Problem List   Diagnosis    Osteoporosis Biliary colic    Narrow complex tachycardia (HCC)    IBS (irritable bowel syndrome)    Hyperlipidemia    S/P laparoscopic cholecystectomy    Ascending aortic aneurysm, 4 cm per CT-chest from 7/25    Abnormal EKG - ST depression; cardio recommends outpatient cardiac cath    Postoperative ileus (HCC)    PAF (paroxysmal atrial fibrillation) (HCC)    Closed compression fracture of L4 lumbar vertebra with delayed healing, subsequent encounter    Closed compression fracture of L4 lumbar vertebra, sequela    Intractable low back pain       Diagnostic: IV, labs, EKG, UA    Therapeutic: Morphine    ED RESULTS   Laboratory results:  Labs Reviewed   CBC WITH AUTO DIFFERENTIAL - Abnormal; Notable for the following components:       Result Value    RBC 4.11 (*)     MCV 99.5 (*)     MCH 33.1 (*)     RDW-SD 47.8 (*)     All other components within normal limits   LIPASE - Abnormal; Notable for the following components:    Lipase 271.8 (*)     All other components within normal limits   URINE WITH REFLEXED MICRO - Abnormal; Notable for the following components:    Ketones, Urine TRACE (*)     Blood, Urine TRACE (*)     Leukocyte Esterase, Urine MODERATE (*)     All other components within normal limits   CULTURE, REFLEXED, URINE    Narrative:     Source: urine, clean catch       Site:           Current Antibiotics: not stated   COMPREHENSIVE METABOLIC PANEL   TROPONIN   ANION GAP   GLOMERULAR FILTRATION RATE, ESTIMATED   OSMOLALITY   CBC WITH AUTO DIFFERENTIAL   PROTIME-INR   APTT       Radiologic studies results:  XR CHEST PORTABLE   Final Result   1. Poor inflation the lungs. Moderate left anterior obliquity of the patient. Mildly ectatic and tortuous descending thoracic aorta and aortic arch.   2. Mild atelectatic/fibrotic stranding left lung base and along right hemidiaphragm.. No effusion seen.            **This report has been created using voice recognition software.  It may contain minor errors which are inherent in voice  recognition technology. **      Final report electronically signed by Dr. Goldie Taveras on 3/14/2023 4:41 PM      XR ABDOMEN (KUB) (SINGLE AP VIEW)   Final Result   1. Nonobstructive bowel gas pattern. 2. Large amount of retained stool in the colon which may represent constipation. **This report has been created using voice recognition software. It may contain minor errors which are inherent in voice recognition technology. **      Final report electronically signed by Dr Johnson Melara on 3/14/2023 4:04 PM      MRI LUMBAR SPINE [address]    (Results Pending)   MRI ABDOMEN W WO CONTRAST MRCP    (Results Pending)       ED Medications administered this visit:   Medications   0.9 % sodium chloride infusion (0 mL/hr IntraVENous Stopped 3/14/23 2026)   sodium chloride flush 0.9 % injection 5-40 mL (has no administration in time range)   sodium chloride flush 0.9 % injection 5-40 mL (has no administration in time range)   0.9 % sodium chloride infusion (has no administration in time range)   ondansetron (ZOFRAN-ODT) disintegrating tablet 4 mg (has no administration in time range)     Or   ondansetron (ZOFRAN) injection 4 mg (has no administration in time range)   polyethylene glycol (GLYCOLAX) packet 17 g (has no administration in time range)   morphine (PF) injection 2 mg ( IntraVENous See Alternative 3/14/23 1749)     Or   morphine injection 4 mg (4 mg IntraVENous Given 3/14/23 1749)   HYDROcodone-acetaminophen (NORCO) 5-325 MG per tablet 1 tablet (has no administration in time range)     Or   HYDROcodone-acetaminophen (NORCO) 5-325 MG per tablet 2 tablet (has no administration in time range)   cyclobenzaprine (FLEXERIL) tablet 10 mg (has no administration in time range)   apixaban (ELIQUIS) tablet 5 mg ( Oral Automatically Held 3/17/23 2100)   aspirin EC tablet 81 mg ( Oral Automatically Held 3/17/23 0900)   levothyroxine (SYNTHROID) tablet 25 mcg (has no administration in time range)   metoprolol tartrate (LOPRESSOR) tablet 25 mg (has no administration in time range)   pantoprazole (PROTONIX) tablet 40 mg (has no administration in time range)   nitroGLYCERIN (NITROSTAT) SL tablet 0.4 mg (has no administration in time range)   lactated ringers IV soln infusion ( IntraVENous New Bag 3/14/23 2027)   morphine (PF) injection 2 mg (2 mg IntraVENous Given 3/14/23 1445)         [facility]     ED Course as of 03/14/23 2118   Tue Mar 14, 2023   1629 Case discussed with Dr. Fleeta Homans, who accepted the patient for admission and requested bridging orders to be placed. [DO]      ED Course User Index  [DO] Amarilis Joaquin DO     Available laboratory and imaging results were independently reviewed and clinically correlated. Decision Rules/Clinical Scores utilized: Laurie Menjivar Code Status:  Not addressed during this ED visit    [facility] Social determinants of health considered to potentially effect treatment and/or disposition plan:    Healthy People 2030, [place] Department of Health and Human Services, Office of Disease Prevention and Health Promotion. Older age     Medical comorbidities impacting treatment or disposition:  Not Applicable. Past Medical History:   Diagnosis Date    Hyperlipidemia     IBS (irritable bowel syndrome)     Senile osteoporosis        Consultants:     Final Assessment and Plan:   The patient reports that she sometimes feels like she is falling due to her pain and we will plan to admit the patient to the hospital for intractable back pain. The diagnosis, extensive differential diagnosis, plan of care, laboratory, and imaging findings were discussed at the bedside. All questions and concerns were addressed at the time of the encounter. MEDICATION CHANGES     DISCHARGE MEDICATIONS:  Current Discharge Medication List               FINAL DISPOSITION     Final diagnoses:   Closed compression fracture of L4 lumbar vertebra, sequela     Condition: condition: serious  Dispo:  Admit to med/surg floor    PATIENT REFERRED TO:  No follow-up provider specified. Critical Care Time   CRITICAL CARE:  None    PROCEDURES: (None if blank)  Procedures:   Medical Decision Making      This transcription was electronically signed. Parts of this transcriptions may have been dictated by use of voice recognition software and electronically transcribed, and parts may have been transcribed with the assistance of an ED scribe. The transcription may contain errors not detected in proofreading.     Electronically Signed: Rodney Lim DO, 03/14/23, 9:18 PM        Rodney Lim DO  Resident  03/14/23 [id number]

## 2023-03-14 NOTE — Clinical Note
Discharge Plan[de-identified] Home with Office Follow-up   Telemetry/Cardiac Monitoring Required?: Yes   Bed request comments: 7k

## 2023-03-15 ENCOUNTER — APPOINTMENT (OUTPATIENT)
Dept: MRI IMAGING | Age: 73
DRG: 543 | End: 2023-03-15
Payer: MEDICARE

## 2023-03-15 PROBLEM — R74.8 ELEVATED LIPASE: Status: ACTIVE | Noted: 2023-03-15

## 2023-03-15 LAB
ALBUMIN SERPL BCG-MCNC: 4.1 G/DL (ref 3.5–5.1)
ALP SERPL-CCNC: 88 U/L (ref 38–126)
ALT SERPL W/O P-5'-P-CCNC: 31 U/L (ref 11–66)
APTT PPP: 29.4 SECONDS (ref 22–38)
AST SERPL-CCNC: 37 U/L (ref 5–40)
BACTERIA UR CULT: ABNORMAL
BASOPHILS ABSOLUTE: 0 THOU/MM3 (ref 0–0.1)
BASOPHILS NFR BLD AUTO: 0.9 %
BILIRUB CONJ SERPL-MCNC: < 0.2 MG/DL (ref 0–0.3)
BILIRUB SERPL-MCNC: 0.7 MG/DL (ref 0.3–1.2)
DEPRECATED RDW RBC AUTO: 49.5 FL (ref 35–45)
EKG ATRIAL RATE: 81 BPM
EKG P AXIS: 23 DEGREES
EKG P-R INTERVAL: 170 MS
EKG Q-T INTERVAL: 406 MS
EKG QRS DURATION: 78 MS
EKG QTC CALCULATION (BAZETT): 471 MS
EKG R AXIS: 6 DEGREES
EKG T AXIS: 36 DEGREES
EKG VENTRICULAR RATE: 81 BPM
EOSINOPHIL NFR BLD AUTO: 9.4 %
EOSINOPHILS ABSOLUTE: 0.4 THOU/MM3 (ref 0–0.4)
ERYTHROCYTE [DISTWIDTH] IN BLOOD BY AUTOMATED COUNT: 13.3 % (ref 11.5–14.5)
HCT VFR BLD AUTO: 37.5 % (ref 37–47)
HGB BLD-MCNC: 12.3 GM/DL (ref 12–16)
IMM GRANULOCYTES # BLD AUTO: 0.02 THOU/MM3 (ref 0–0.07)
IMM GRANULOCYTES NFR BLD AUTO: 0.5 %
INR PPP: 1.31 (ref 0.85–1.13)
LYMPHOCYTES ABSOLUTE: 1.6 THOU/MM3 (ref 1–4.8)
LYMPHOCYTES NFR BLD AUTO: 37.1 %
MCH RBC QN AUTO: 33.2 PG (ref 26–33)
MCHC RBC AUTO-ENTMCNC: 32.8 GM/DL (ref 32.2–35.5)
MCV RBC AUTO: 101.1 FL (ref 81–99)
MONOCYTES ABSOLUTE: 0.4 THOU/MM3 (ref 0.4–1.3)
MONOCYTES NFR BLD AUTO: 9.4 %
NEUTROPHILS NFR BLD AUTO: 42.7 %
NRBC BLD AUTO-RTO: 0 /100 WBC
ORGANISM: ABNORMAL
PLATELET # BLD AUTO: 129 THOU/MM3 (ref 130–400)
PMV BLD AUTO: 10.2 FL (ref 9.4–12.4)
PROT SERPL-MCNC: 6.5 G/DL (ref 6.1–8)
RBC # BLD AUTO: 3.71 MILL/MM3 (ref 4.2–5.4)
SEGMENTED NEUTROPHILS ABSOLUTE COUNT: 1.8 THOU/MM3 (ref 1.8–7.7)
WBC # BLD AUTO: 4.3 THOU/MM3 (ref 4.8–10.8)

## 2023-03-15 PROCEDURE — 85730 THROMBOPLASTIN TIME PARTIAL: CPT

## 2023-03-15 PROCEDURE — 6360000002 HC RX W HCPCS: Performed by: PHYSICIAN ASSISTANT

## 2023-03-15 PROCEDURE — 74183 MRI ABD W/O CNTR FLWD CNTR: CPT

## 2023-03-15 PROCEDURE — 6370000000 HC RX 637 (ALT 250 FOR IP): Performed by: STUDENT IN AN ORGANIZED HEALTH CARE EDUCATION/TRAINING PROGRAM

## 2023-03-15 PROCEDURE — 1200000003 HC TELEMETRY R&B

## 2023-03-15 PROCEDURE — 6360000004 HC RX CONTRAST MEDICATION: Performed by: STUDENT IN AN ORGANIZED HEALTH CARE EDUCATION/TRAINING PROGRAM

## 2023-03-15 PROCEDURE — 99223 1ST HOSP IP/OBS HIGH 75: CPT | Performed by: INTERNAL MEDICINE

## 2023-03-15 PROCEDURE — 85610 PROTHROMBIN TIME: CPT

## 2023-03-15 PROCEDURE — 72148 MRI LUMBAR SPINE W/O DYE: CPT

## 2023-03-15 PROCEDURE — 85025 COMPLETE CBC W/AUTO DIFF WBC: CPT

## 2023-03-15 PROCEDURE — A9579 GAD-BASE MR CONTRAST NOS,1ML: HCPCS | Performed by: STUDENT IN AN ORGANIZED HEALTH CARE EDUCATION/TRAINING PROGRAM

## 2023-03-15 PROCEDURE — 93010 ELECTROCARDIOGRAM REPORT: CPT | Performed by: INTERNAL MEDICINE

## 2023-03-15 PROCEDURE — 6370000000 HC RX 637 (ALT 250 FOR IP): Performed by: PHYSICIAN ASSISTANT

## 2023-03-15 PROCEDURE — 72157 MRI CHEST SPINE W/O & W/DYE: CPT

## 2023-03-15 PROCEDURE — 99232 SBSQ HOSP IP/OBS MODERATE 35: CPT | Performed by: PHYSICIAN ASSISTANT

## 2023-03-15 PROCEDURE — A9579 GAD-BASE MR CONTRAST NOS,1ML: HCPCS | Performed by: PHYSICIAN ASSISTANT

## 2023-03-15 PROCEDURE — 36415 COLL VENOUS BLD VENIPUNCTURE: CPT

## 2023-03-15 PROCEDURE — 2580000003 HC RX 258: Performed by: PHYSICIAN ASSISTANT

## 2023-03-15 PROCEDURE — 6360000004 HC RX CONTRAST MEDICATION: Performed by: PHYSICIAN ASSISTANT

## 2023-03-15 RX ORDER — DIAZEPAM 5 MG/1
5 TABLET ORAL
Status: COMPLETED | OUTPATIENT
Start: 2023-03-15 | End: 2023-03-15

## 2023-03-15 RX ORDER — SENNA PLUS 8.6 MG/1
1 TABLET ORAL NIGHTLY PRN
Status: DISCONTINUED | OUTPATIENT
Start: 2023-03-15 | End: 2023-03-18 | Stop reason: HOSPADM

## 2023-03-15 RX ADMIN — SODIUM CHLORIDE, PRESERVATIVE FREE 10 ML: 5 INJECTION INTRAVENOUS at 10:50

## 2023-03-15 RX ADMIN — SODIUM CHLORIDE, PRESERVATIVE FREE 10 ML: 5 INJECTION INTRAVENOUS at 19:28

## 2023-03-15 RX ADMIN — GADOTERIDOL 20 ML: 279.3 INJECTION, SOLUTION INTRAVENOUS at 10:09

## 2023-03-15 RX ADMIN — MORPHINE SULFATE 2 MG: 2 INJECTION, SOLUTION INTRAMUSCULAR; INTRAVENOUS at 16:31

## 2023-03-15 RX ADMIN — METOPROLOL TARTRATE 25 MG: 25 TABLET, FILM COATED ORAL at 19:25

## 2023-03-15 RX ADMIN — GADOTERIDOL 20 ML: 279.3 INJECTION, SOLUTION INTRAVENOUS at 17:56

## 2023-03-15 RX ADMIN — LEVOTHYROXINE SODIUM 25 MCG: 0.03 TABLET ORAL at 06:17

## 2023-03-15 RX ADMIN — CYCLOBENZAPRINE 10 MG: 10 TABLET, FILM COATED ORAL at 16:32

## 2023-03-15 RX ADMIN — HYDROCODONE BITARTRATE AND ACETAMINOPHEN 2 TABLET: 5; 325 TABLET ORAL at 06:17

## 2023-03-15 RX ADMIN — SODIUM CHLORIDE: 9 INJECTION, SOLUTION INTRAVENOUS at 00:50

## 2023-03-15 RX ADMIN — PANTOPRAZOLE SODIUM 40 MG: 40 TABLET, DELAYED RELEASE ORAL at 06:17

## 2023-03-15 RX ADMIN — METOPROLOL TARTRATE 25 MG: 25 TABLET, FILM COATED ORAL at 10:50

## 2023-03-15 RX ADMIN — SODIUM CHLORIDE: 9 INJECTION, SOLUTION INTRAVENOUS at 10:48

## 2023-03-15 RX ADMIN — HYDROCODONE BITARTRATE AND ACETAMINOPHEN 2 TABLET: 5; 325 TABLET ORAL at 18:12

## 2023-03-15 RX ADMIN — PSYLLIUM HUSK 1 PACKET: 3.4 POWDER ORAL at 21:51

## 2023-03-15 RX ADMIN — MORPHINE SULFATE 2 MG: 2 INJECTION, SOLUTION INTRAMUSCULAR; INTRAVENOUS at 08:28

## 2023-03-15 RX ADMIN — DIAZEPAM 5 MG: 5 TABLET ORAL at 08:27

## 2023-03-15 ASSESSMENT — ENCOUNTER SYMPTOMS
BACK PAIN: 1
COUGH: 0
CHEST TIGHTNESS: 0
ABDOMINAL PAIN: 1
NAUSEA: 1
CONSTIPATION: 1
CHOKING: 0
SHORTNESS OF BREATH: 0
VOMITING: 1

## 2023-03-15 ASSESSMENT — PAIN SCALES - GENERAL
PAINLEVEL_OUTOF10: 6
PAINLEVEL_OUTOF10: 7
PAINLEVEL_OUTOF10: 9
PAINLEVEL_OUTOF10: 5

## 2023-03-15 ASSESSMENT — PAIN DESCRIPTION - ORIENTATION
ORIENTATION: RIGHT;LEFT
ORIENTATION: LOWER

## 2023-03-15 ASSESSMENT — PAIN DESCRIPTION - LOCATION
LOCATION: BACK

## 2023-03-15 NOTE — PROGRESS NOTES
Pt was in bed as her family and [place]  were visiting with her. Her  said that he will anoint her. She was encouraged. 03/15/23 1642   Encounter Summary   Encounter Overview/Reason  Initial Encounter   Service Provided For: Patient and family together   [facility]   Support System Spouse; Children;Advent/judd community   Last Encounter  03/15/23  (Anointed)   Complexity of Encounter Low   Spiritual/Emotional needs   Type Spiritual Support   Rituals, Rites and Sacraments   Type Anointing   Assessment/Intervention/Outcome   Assessment Hopeful   Intervention Empowerment

## 2023-03-15 NOTE — H&P
Orthopedic Spine H&P  Department of Orthopedic Surgery  Adelfo Dumont PA-C  Attending: Dr. Adelina Villegas    Chief Complaint: Intractable low back pain    HPI: Ronit Mejía is a 41-year-old female who has been dealing with a lumbar pain and abdominal pain that initially began this past Saturday after eating pizza. She does endorse that she had nausea and vomiting after eating pizza. She denies any lower extremity radiculopathy. She does note she had 2 bowel movements that day but has not had a bowel movement since. She does complain of abdominal pain which is overall improved but more significantly low back pain. She attempted undergoing an MRI yesterday but was unable to tolerate due to the significant pain. She does have a known history of a L4 compression fracture which was treated conservatively in the past.  She went to the emergency departments over the weekend and was discharged with a follow-up outpatient in our office with Percocet without any improvement of her symptoms. For this, we advised her to return back to emergency department work-up with CT abdomen which demonstrated at L4 and L1 compression fracture. And be admitted under our service for further work-up. Internal medicine has been consulted for medical management and does feel they have concern for pancreatitis due to elevated lipase and postprandial nausea/vomiting with abdominal pain. They are also planning an MRI abdomen. We discussed that we can provide pain medications as well as Valium to take prior to the MRI studies of both her abdomen and lumbar spine. Assessment:    1. Intractable low back pain, await MRI results for evaluation of acute on chronic L1 and L4 compression deformity. 2.  Abdominal pain with postprandial nausea, vomiting with elevated lipase 271. Internal medicine planning MRI abdomen for possible pancreatitis    Plan:    1. PT OT today. 2.  MRI lumbar and abdomen today.   Provide preprocedural pain medications and Valium prior to image study. 3.  ADAT   4. Pain control, continue current regimen   5. Discharge pending clinical course   6. IM following for medical management and clearance for potential procedure pending results of MRI. Eliquis held. Allergies   Allergen Reactions    Amitiza [Lubiprostone] Diarrhea    Bentyl [Dicyclomine Hcl]      Severe spasms    Lipitor [Atorvastatin]      Muscle aches    Soma [Carisoprodol] Nausea And Vomiting     Prior to Visit Medications    Medication Sig Taking? Authorizing Provider   lidocaine (LIDODERM) 5 % Place 1 patch onto the skin daily for 7 days 12 hours on, 12 hours off. Patient not taking: Reported on 3/14/2023  Yovana Julien MD   orphenadrine (NORFLEX) 100 MG extended release tablet Take 1 tablet by mouth 2 times daily for 5 days  Patient not taking: Reported on 3/14/2023  Yovana Julien MD   oxyCODONE-acetaminophen (PERCOCET) 5-325 MG per tablet Take 1 tablet by mouth every 6 hours as needed for Pain for up to 3 days. Intended supply: 3 days.  Take lowest dose possible to manage pain Max Daily Amount: 4 tablets  Mi Peter DO   levothyroxine (SYNTHROID) 25 MCG tablet Take 25 mcg by mouth Daily  Historical Provider, MD   fluticasone (FLONASE) 50 MCG/ACT nasal spray 1 spray by Each Nostril route daily  Patient not taking: Reported on 3/14/2023  Historical Provider, MD   pantoprazole (PROTONIX) 40 MG tablet Take 40 mg by mouth daily  Historical Provider, MD   cyclobenzaprine (FLEXERIL) 10 MG tablet Take 10 mg by mouth 3 times daily as needed for Muscle spasms  Historical Provider, MD   rosuvastatin (CRESTOR) 20 MG tablet Take 1 tablet by mouth daily  Clois Radha, MD   metoprolol tartrate (LOPRESSOR) 25 MG tablet TAKE 1 TABLET TWICE A DAY  Ema Garcia, MD   apixaban (ELIQUIS) 5 MG TABS tablet TAKE 1 TABLET TWICE A DAY  Ema Garcia, MD   denosumab (PROLIA) 60 MG/ML SOSY SC injection Inject 60 mg into the skin once 2x year  Historical Provider, MD   Psyllium (METAMUCIL FIBER PO) Take 1 Scoop by mouth daily  Historical Provider, MD   aspirin 81 MG tablet Take 81 mg by mouth daily  Historical Provider, MD   nitroGLYCERIN (NITROSTAT) 0.4 MG SL tablet Place 1 tablet under the tongue every 5 minutes as needed for Chest pain  Patient not taking: Reported on 3/14/2023  Manuel Esqueda MD   Calcium Citrate-Vitamin D 500-500 MG-UNIT CHEW Take 1 tablet by mouth 2 times daily   Historical Provider, MD     Past Medical History:   Diagnosis Date    Hyperlipidemia     IBS (irritable bowel syndrome)     Senile osteoporosis      Past Surgical History:   Procedure Laterality Date    BACK SURGERY  2015    CATARACT REMOVAL Right 01/2020    CATARACT REMOVAL Left 02/2020    CHOLECYSTECTOMY, LAPAROSCOPIC N/A 7/24/2017    CHOLECYSTECTOMY LAPAROSCOPIC performed by Shannan Mcdaniel MD at [place] [address]  08, 13, 2018    ERCP  7/28/2017    ERCP STENT INSERTION performed by Clair Huff MD at [facility]    ERCP Left 7/28/2017    ERCP ENDOSCOPIC RETROGRADE CHOLANGIOPANCREATOGRAPHY performed by Clair Huff MD at [facility]    ERCP N/A 9/26/2017    ERCP  performed by Clair Huff MD at [facility]  2019    banding    HYSTERECTOMY (CERVIX STATUS UNKNOWN)  1984    JOSETTE BSO    KNEE ARTHROSCOPY Left 2014    CASIMIRO STEROTACTIC LOC BREAST BIOPSY RIGHT Right 05/16/2006    fibrocystic changes    OVARY REMOVAL Bilateral     1984    TONSILLECTOMY      as a child    VEIN SURGERY Bilateral 2016    legs       Review of Systems   Constitutional:  Negative for chills and fever. Respiratory:  Negative for cough, choking, chest tightness and shortness of breath. Cardiovascular:  Negative for chest pain and palpitations. Gastrointestinal:  Positive for abdominal pain, constipation, nausea and vomiting. Genitourinary:  Negative for difficulty urinating, dysuria and urgency. Musculoskeletal:  Positive for back pain.  Negative for neck pain.   Skin: Negative. Neurological:  Negative for weakness, numbness and headaches. Physical Exam  Constitutional:       General: She is not in acute distress. Appearance: She is normal weight. Pulmonary:      Effort: Pulmonary effort is normal. No respiratory distress. Abdominal:      Palpations: Abdomen is soft. Tenderness: There is no abdominal tenderness. Musculoskeletal:         General: Tenderness (Tenderness to palpation midline and bilateral paraspinous at the L4-L5 level.) present. Skin:     General: Skin is warm and dry. Neurological:      General: No focal deficit present. Mental Status: She is alert and oriented to person, place, and time. Sensory: No sensory deficit. Motor: No weakness (/5 bilateral knee extension dorsiflexion EHL contraction plantarflexion. ). Imaging:   XR ABDOMEN (KUB) (SINGLE AP VIEW)    Result Date: 3/14/2023  PROCEDURE: XR ABDOMEN (KUB) (SINGLE AP VIEW) CLINICAL INFORMATION: 70-year-old female with abdominal pain for 4 days. COMPARISON: Correlation made with radiograph 7/27/2017 TECHNIQUE: 3 AP views of the abdomen were obtained in the supine position FINDINGS: Some surgical clips are present in the right upper quadrant of the abdomen. There is a moderate amount retained stool the colon. There are no distended bowel loops. There are no displaced bowel loops. There is no gross free air. No suspicious densities are present. Severe degenerative changes are present in the lumbar spine. 1. Nonobstructive bowel gas pattern. 2. Large amount of retained stool in the colon which may represent constipation. **This report has been created using voice recognition software. It may contain minor errors which are inherent in voice recognition technology. ** Final report electronically signed by Dr Melania Thompson on 3/14/2023 4:04 PM    CTA CHEST W WO CONTRAST    Result Date: 3/11/2023  CT angiogram chest without/with contrast Multiplanar reconstructions and MIPS Comparison: 04/05/2022 Findings: Contrast bolus timing optimized for thoracic aortic visualization. Pulmonary arteries are insufficiently opacified for assessment. No significant focal parenchymal abnormalities. No significant mediastinal or hilar adenopathy. No free pleural fluid noted. Numerous chronic thoracic and lumbar compression fractures. Visualized areas appear to be unchanged from prior study. Bony assessment suboptimal, only 3D reconstructions were sent. Thoracic aorta mildly prominent at 4.4 cm. Heart size normal. Great vessel origins patent. Mild coronary calcifications. Impression: No acute vascular abnormality __________________________________ CT angiogram abdomen and pelvis with contrast Multiplanar reconstructions and 3D processing Comparison: 04/05/2022 Findings: Abdominal aorta normal in caliber without dissection. It is considerably tortuous with calcified plaques noted. Celiac, mesenteric and renal origins patent without stenosis. There are calcified plaques at the origins of all of these vessels. Age-indeterminate sclerotic moderate L4 compression fracture. Superior endplate is sclerotic, finding is likely chronic. L1 compression fracture is unchanged. Heterogeneous left lobe of liver without definite focal abnormality. This may be due to the arterial phase imaging for this study. No focal abnormalities liver or spleen. Hepatomegaly. Pancreas, adrenal glands and kidneys unremarkable. No renal stones or hydronephrosis. Cholecystectomy. No significant free fluid or adenopathy noted in the pelvis. No evidence for diverticulitis. Appendix not identified. Hysterectomy. No adnexal abnormality.  Impression: No acute process This document has been electronically signed by: Josselin Staples MD on 03/11/2023 11:49 PM All CTs at this facility use dose modulation techniques and iterative reconstructions, and/or weight-based dosing when appropriate to reduce radiation to a low as reasonably achievable. 3D Post-processing was performed on this study. XR CHEST PORTABLE    Result Date: 3/14/2023  PROCEDURE: XR CHEST PORTABLE CLINICAL INFORMATION: pre op. L4 compression fracture. COMPARISON: No prior study. TECHNIQUE: A single mobile view of the chest was obtained. 1. Poor inflation the lungs. Moderate left anterior obliquity of the patient. Mildly ectatic and tortuous descending thoracic aorta and aortic arch. 2. Mild atelectatic/fibrotic stranding left lung base and along right hemidiaphragm. . No effusion seen. **This report has been created using voice recognition software. It may contain minor errors which are inherent in voice recognition technology. ** Final report electronically signed by Dr. Brayan Camejo on 3/14/2023 4:41 PM    CTA ABDOMEN PELVIS W WO CONTRAST    Result Date: 3/12/2023  CT angiogram chest without/with contrast Multiplanar reconstructions and MIPS Comparison: 04/05/2022 Findings: Contrast bolus timing optimized for thoracic aortic visualization. Pulmonary arteries are insufficiently opacified for assessment. No significant focal parenchymal abnormalities. No significant mediastinal or hilar adenopathy. No free pleural fluid noted. Numerous chronic thoracic and lumbar compression fractures. Visualized areas appear to be unchanged from prior study. Bony assessment suboptimal, only 3D reconstructions were sent. Thoracic aorta mildly prominent at 4.4 cm. Heart size normal. Great vessel origins patent. Mild coronary calcifications. Impression: No acute vascular abnormality __________________________________ CT angiogram abdomen and pelvis with contrast Multiplanar reconstructions and 3D processing Comparison: 04/05/2022 Findings: Abdominal aorta normal in caliber without dissection. It is considerably tortuous with calcified plaques noted. Celiac, mesenteric and renal origins patent without stenosis.  There are calcified plaques at the origins of all of these vessels. Age-indeterminate sclerotic moderate L4 compression fracture. Superior endplate is sclerotic, finding is likely chronic. L1 compression fracture is unchanged. Heterogeneous left lobe of liver without definite focal abnormality. This may be due to the arterial phase imaging for this study. No focal abnormalities liver or spleen. Hepatomegaly. Pancreas, adrenal glands and kidneys unremarkable. No renal stones or hydronephrosis. Cholecystectomy. No significant free fluid or adenopathy noted in the pelvis. No evidence for diverticulitis. Appendix not identified. Hysterectomy. No adnexal abnormality. Impression: No acute process This document has been electronically signed by: Janiya Qiu MD on 03/12/2023 12:09 AM All CTs at this facility use dose modulation techniques and iterative reconstructions, and/or weight-based dosing when appropriate to reduce radiation to a low as reasonably achievable. 3D Post-processing was performed on this study.       Electronically signed by Dejan Garcia PA-C on 3/15/23 at 6:39 AM EDT

## 2023-03-15 NOTE — PROGRESS NOTES
Hospitalist Progress Note    Patient:  Denis Jimenez      Unit/Bed:8A-12/012-A    YOB: 1950    MRN: [id number]       Acct: [de-identified]     PCP: OLEG Marion CNP    Date of Admission: 3/14/2023    Assessment/Plan:    Back Pain: L4 and L1 compression fractures noted on CT  -Orthopedic spine primary, managing  -MRI T spine performed  -Possible upcoming kyphoplasty  -Holding Eliquis/ASA  -Advance diet as tolerated    Osteoporosis: History of multiple compression fractures in her thoracic spine that were treated with kyphoplasty by Dr. Taina Medel    Suspected Pancreatitis: Lipase 271 with post prandial N/v and abdominal pain  -MRCP obtained, no peripancreatic edema or inflammation per report  -Advance diet as tolerated  -Aggressive IVF  -Pain control per primary service    Pre-operative CV exam: (Per provider performing initial consult)  -Possible upcoming possible kyphoplasty, unclear date  (low risk surgery)  -will hold Eliquis  -No active cardiac complaints.    -Last ECG reviewed showing NSR  -Last TTE 7/26/17 reviewed showing EF 65% no valvular disease  -No prior LHC on file   -RCRI = 1 (ASA class II)  -Her background risk factors including atrial fibrillation and TAA increase her overall risk to intermediate, and the surgery itself is low risk risk.   -Able to do > 4 METS. -Patient accepts the risk of the planned procedure and understands the possibility of dwayne-operative cardiac event, including but not limited to MI, VT/VF, cardiac arrest, and death, and wishes to proceed with the procedure.  -No further cardiac testing prior to upcoming surgery. TAA: stable 4.1cm    Paroxysmal atrial fibrillation: [id number] 3 OAC on Eliquis. Rate controlled with metoprolol. Holding for possible intervention    Hypothyroidism: Continue Synthroid 25mcg    Chief Complaint: consulted for medical management      Subjective: 67 y.o. female admitted to the Orthopedic surgery service for back pain.  Patient denies chest pain. She denies SOB. She denies vomiting. She denies BM. Medications:    Infusion Medications    sodium chloride 100 mL/hr at 03/15/23 0050    sodium chloride      lactated ringers IV soln Stopped (03/15/23 0049)     Scheduled Medications    sodium chloride flush  5-40 mL IntraVENous 2 times per day    [Held by provider] apixaban  5 mg Oral BID    [Held by provider] aspirin  81 mg Oral Daily    levothyroxine  25 mcg Oral Daily    metoprolol tartrate  25 mg Oral BID    pantoprazole  40 mg Oral Daily     PRN Meds: sodium chloride flush, sodium chloride, ondansetron **OR** ondansetron, polyethylene glycol, morphine **OR** morphine, HYDROcodone 5 mg - acetaminophen **OR** HYDROcodone 5 mg - acetaminophen, cyclobenzaprine, nitroGLYCERIN    No intake or output data in the 24 hours ending 03/15/23 0847    Diet:  ADULT DIET; Regular    Exam:  BP (!) 106/56   Pulse 82   Temp 98.4 °F (36.9 °C) (Oral)   Resp 18   Ht 5' 9\" (1.753 m)   Wt 205 lb (93 kg)   SpO2 93%   BMI 30.27 kg/m²     Physical Exam  Constitutional:       Interventions: She is not intubated. Cardiovascular:      Rate and Rhythm: Normal rate and regular rhythm. Pulmonary:      Effort: She is not intubated. Musculoskeletal:      Comments: MARTÍNEZ x 4   Neurological:      Mental Status: She is alert. Psychiatric:         Speech: She is communicative. Labs:   Recent Labs     03/14/23  1441 03/15/23  0331   WBC 5.5 4.3*   HGB 13.6 12.3   HCT 40.9 37.5    129*     Recent Labs     03/14/23  1441      K 4.5      CO2 23   BUN 15   CREATININE 1.0   CALCIUM 9.2     Recent Labs     03/14/23  1441   AST 36   ALT 31   BILITOT 0.7   ALKPHOS 87     Recent Labs     03/15/23  0331   INR 1.31*     No results for input(s): [facility] Memory in the last 72 hours.     Urinalysis:      Lab Results   Component Value Date/Time    NITRU NEGATIVE 03/14/2023 03:45 PM    WBCUA 10-15 03/14/2023 03:45 PM    BACTERIA NONE SEEN 03/14/2023 03:45 PM    RBCUA 0-2 03/14/2023 03:45 PM    BLOODU TRACE 03/14/2023 03:45 PM    GLUCOSEU NEGATIVE 03/14/2023 03:45 PM       Radiology:  XR CHEST PORTABLE   Final Result   1. Poor inflation the lungs. Moderate left anterior obliquity of the patient. Mildly ectatic and tortuous descending thoracic aorta and aortic arch. 2. Mild atelectatic/fibrotic stranding left lung base and along right hemidiaphragm. . No effusion seen. **This report has been created using voice recognition software. It may contain minor errors which are inherent in voice recognition technology. **      Final report electronically signed by Dr. Cesar Ayala on 3/14/2023 4:41 PM      XR ABDOMEN (KUB) (SINGLE AP VIEW)   Final Result   1. Nonobstructive bowel gas pattern. 2. Large amount of retained stool in the colon which may represent constipation. **This report has been created using voice recognition software. It may contain minor errors which are inherent in voice recognition technology. **      Final report electronically signed by Dr Amaris Benson on 3/14/2023 4:04 PM      MRI LUMBAR SPINE [address]    (Results Pending)   MRI ABDOMEN W WO CONTRAST MRCP    (Results Pending)       Diet: ADULT DIET;  Regular    DVT prophylaxis: [] Lovenox                                 [] SCDs                                 [] SQ Heparin                                 [] Encourage ambulation           [] Already on Anticoagulation     Disposition:    [x] Home       [] TCU       [] Rehab       [] Psych       [] SNF       [] [place]       [] Other-    Code Status: Full Code    PT/OT Eval:         Electronically signed by Mercedez Holland PA-C on 3/15/2023 at 8:47 AM

## 2023-03-15 NOTE — CARE COORDINATION
Case Management Assessment  Initial Evaluation    Date/Time of Evaluation: 3/15/2023 3:38 PM  Assessment Completed by: Shanthi Montesinos RN    If patient is discharged prior to next notation, then this note serves as note for discharge by case management. Patient Name: Britton Wren                   YOB: 1950  Diagnosis: Intractable low back pain [M54.59]  Closed compression fracture of L4 lumbar vertebra, sequela [S32.040S]  Closed compression fracture of L4 lumbar vertebra with delayed healing, subsequent encounter [S32.040G]                   Date / Time: 3/14/2023  1:16 PM  Location: [address] [place] [address]     Patient Admission Status: Inpatient   Readmission Risk Low 0-14, Mod 15-19), High > 20: Readmission Risk Score: 13.9    Current PCP: OLEG Hanley CNP  PCP verified by ? Yes    Chart Reviewed: Yes      History Provided by: Patient  Patient Orientation: Alert and Oriented    Patient Cognition: Alert    Hospitalization in the last 30 days (Readmission):  No    If yes, Readmission Assessment in CM Navigator will be completed. Advance Directives:      Code Status: Full Code   Patient's Primary Decision Maker is: Named in Scanned ACP Document      Discharge Planning:    Patient lives with: Spouse/Significant Other Type of Home: House  Primary Care Giver: Self  Patient Support Systems include: Spouse/Significant Other, Children, Family Members   Current Financial resources: Medicare  Current community resources: None  Current services prior to admission: Durable Medical Equipment            Current DME: Walker, Bedside Commode, Wheelchair            Type of Home Care services:  None    ADLS  Prior functional level: Independent in ADLs/IADLs  Current functional level: Independent in ADLs/IADLs    Family can provide assistance at DC: Yes  Would you like Case Management to discuss the discharge plan with any other family members/significant others, and if so, who?  No  Plans to Return to Present Housing: Yes  Other Identified Issues/Barriers to RETURNING to current housing: no  Potential Assistance needed at discharge: N/A            Potential DME:    Patient expects to discharge to: [address] [place] [address] for transportation at discharge: Family    Financial    Payor: Peggy Hanks / Plan: MEDICARE PART A AND B / Product Type: *No Product type* /     Does insurance require precert for SNF: No    Potential assistance Purchasing Medications: No  Meds-to-Beds request: Yes      [facility] - [address] [place], [place] - [address]  [facility] [facility]  [address]  Phone: [phone] Fax: [address] [id number] - [address] [place], [place] - [address] [facility] [phone] - F [phone]  [address] [address]  Phone: [phone] Fax: [phone]      Notes:    Factors facilitating achievement of predicted outcomes: Family support, Motivated, Cooperative, Pleasant, and Sense of humor    Barriers to discharge: Medical complications    Additional Case Management Notes: Admit from ER with intractable back pain. Orthopedic surgery attending. Hospitalist consulted for medical management. IVF. Procedure: 3/14 KUB: 1. Nonobstructive bowel gas pattern. 2. Large amount of retained stool in the colon which may represent constipation. 3/15 MRI L Spine:   1. Chronic compression fractures of L1 and L4.   2. Moderate to severe bilateral foraminal stenosis due to a combination of degenerative changes at the L3-4 level. 3. Dorsal epidural abnormality on the left in the lower thoracic spine at the T11-T12 level. A thoracic spine MRI with and without contrast is recommended for further evaluation. This appears to be causing severe spinal canal stenosis. 3/15 MRI Abd W WO: 1. No peripancreatic edema or inflammation. Correlate with lipase levels for acute pancreatitis. 2. Segmental atrophy is seen in the liver with associated intrahepatic biliary dilatation.    3. Mild ascites. MRI T Spine W WO: Ordered    The Plan for Transition of Care is related to the following treatment goals of Intractable low back pain [M54.59]  Closed compression fracture of L4 lumbar vertebra, sequela [S32.040S]  Closed compression fracture of L4 lumbar vertebra with delayed healing, subsequent encounter [S32.040G]    Patient Goals/Plan/Treatment Preferences: From home with . Has supportive family. Was able to drive and care for self independently prior to this admission. States she has a walker, BSC, and wheelchair at home. Will follow for potential issues. Transportation/Food Security/Housekeeping Addressed: No issues identified.      Leila Colby RN  Case Management Department

## 2023-03-16 LAB
ALBUMIN SERPL BCG-MCNC: 3.6 G/DL (ref 3.5–5.1)
ALP SERPL-CCNC: 154 U/L (ref 38–126)
ALT SERPL W/O P-5'-P-CCNC: 63 U/L (ref 11–66)
ANION GAP SERPL CALC-SCNC: 13 MEQ/L (ref 8–16)
AST SERPL-CCNC: 88 U/L (ref 5–40)
BASOPHILS ABSOLUTE: 0 THOU/MM3 (ref 0–0.1)
BASOPHILS NFR BLD AUTO: 0.6 %
BILIRUB CONJ SERPL-MCNC: < 0.2 MG/DL (ref 0–0.3)
BILIRUB SERPL-MCNC: 0.6 MG/DL (ref 0.3–1.2)
BUN SERPL-MCNC: 13 MG/DL (ref 7–22)
CALCIUM SERPL-MCNC: 8.3 MG/DL (ref 8.5–10.5)
CHLORIDE SERPL-SCNC: 108 MEQ/L (ref 98–111)
CO2 SERPL-SCNC: 23 MEQ/L (ref 23–33)
CREAT SERPL-MCNC: 0.9 MG/DL (ref 0.4–1.2)
DEPRECATED RDW RBC AUTO: 50.7 FL (ref 35–45)
EOSINOPHIL NFR BLD AUTO: 13.5 %
EOSINOPHILS ABSOLUTE: 0.6 THOU/MM3 (ref 0–0.4)
ERYTHROCYTE [DISTWIDTH] IN BLOOD BY AUTOMATED COUNT: 13.4 % (ref 11.5–14.5)
GFR SERPL CREATININE-BSD FRML MDRD: > 60 ML/MIN/1.73M2
GLUCOSE SERPL-MCNC: 100 MG/DL (ref 70–108)
HCT VFR BLD AUTO: 38.1 % (ref 37–47)
HGB BLD-MCNC: 12.4 GM/DL (ref 12–16)
IMM GRANULOCYTES # BLD AUTO: 0.01 THOU/MM3 (ref 0–0.07)
IMM GRANULOCYTES NFR BLD AUTO: 0.2 %
LIPASE SERPL-CCNC: 268 U/L (ref 5.6–51.3)
LYMPHOCYTES ABSOLUTE: 1.3 THOU/MM3 (ref 1–4.8)
LYMPHOCYTES NFR BLD AUTO: 28.3 %
MCH RBC QN AUTO: 33.4 PG (ref 26–33)
MCHC RBC AUTO-ENTMCNC: 32.5 GM/DL (ref 32.2–35.5)
MCV RBC AUTO: 102.7 FL (ref 81–99)
MONOCYTES ABSOLUTE: 0.5 THOU/MM3 (ref 0.4–1.3)
MONOCYTES NFR BLD AUTO: 10.1 %
NEUTROPHILS NFR BLD AUTO: 47.3 %
NRBC BLD AUTO-RTO: 0 /100 WBC
PLATELET # BLD AUTO: 127 THOU/MM3 (ref 130–400)
PMV BLD AUTO: 10 FL (ref 9.4–12.4)
POTASSIUM SERPL-SCNC: 4.4 MEQ/L (ref 3.5–5.2)
PROT SERPL-MCNC: 5.3 G/DL (ref 6.1–8)
RBC # BLD AUTO: 3.71 MILL/MM3 (ref 4.2–5.4)
SEGMENTED NEUTROPHILS ABSOLUTE COUNT: 2.2 THOU/MM3 (ref 1.8–7.7)
SODIUM SERPL-SCNC: 144 MEQ/L (ref 135–145)
WBC # BLD AUTO: 4.7 THOU/MM3 (ref 4.8–10.8)

## 2023-03-16 PROCEDURE — 97166 OT EVAL MOD COMPLEX 45 MIN: CPT

## 2023-03-16 PROCEDURE — 83690 ASSAY OF LIPASE: CPT

## 2023-03-16 PROCEDURE — 99232 SBSQ HOSP IP/OBS MODERATE 35: CPT | Performed by: PHYSICIAN ASSISTANT

## 2023-03-16 PROCEDURE — 6370000000 HC RX 637 (ALT 250 FOR IP): Performed by: STUDENT IN AN ORGANIZED HEALTH CARE EDUCATION/TRAINING PROGRAM

## 2023-03-16 PROCEDURE — 80048 BASIC METABOLIC PNL TOTAL CA: CPT

## 2023-03-16 PROCEDURE — 1200000003 HC TELEMETRY R&B

## 2023-03-16 PROCEDURE — 6370000000 HC RX 637 (ALT 250 FOR IP): Performed by: PHYSICIAN ASSISTANT

## 2023-03-16 PROCEDURE — 36415 COLL VENOUS BLD VENIPUNCTURE: CPT

## 2023-03-16 PROCEDURE — 97530 THERAPEUTIC ACTIVITIES: CPT

## 2023-03-16 PROCEDURE — 2580000003 HC RX 258: Performed by: STUDENT IN AN ORGANIZED HEALTH CARE EDUCATION/TRAINING PROGRAM

## 2023-03-16 PROCEDURE — 85025 COMPLETE CBC W/AUTO DIFF WBC: CPT

## 2023-03-16 PROCEDURE — 2580000003 HC RX 258: Performed by: PHYSICIAN ASSISTANT

## 2023-03-16 PROCEDURE — 80076 HEPATIC FUNCTION PANEL: CPT

## 2023-03-16 RX ADMIN — LEVOTHYROXINE SODIUM 25 MCG: 0.03 TABLET ORAL at 05:04

## 2023-03-16 RX ADMIN — CYCLOBENZAPRINE 10 MG: 10 TABLET, FILM COATED ORAL at 07:47

## 2023-03-16 RX ADMIN — SODIUM CHLORIDE, PRESERVATIVE FREE 10 ML: 5 INJECTION INTRAVENOUS at 07:47

## 2023-03-16 RX ADMIN — POLYETHYLENE GLYCOL 3350 17 G: 17 POWDER, FOR SOLUTION ORAL at 15:48

## 2023-03-16 RX ADMIN — METOPROLOL TARTRATE 25 MG: 25 TABLET, FILM COATED ORAL at 23:32

## 2023-03-16 RX ADMIN — CYCLOBENZAPRINE 10 MG: 10 TABLET, FILM COATED ORAL at 15:44

## 2023-03-16 RX ADMIN — PANTOPRAZOLE SODIUM 40 MG: 40 TABLET, DELAYED RELEASE ORAL at 05:04

## 2023-03-16 RX ADMIN — SODIUM CHLORIDE, POTASSIUM CHLORIDE, SODIUM LACTATE AND CALCIUM CHLORIDE: 600; 310; 30; 20 INJECTION, SOLUTION INTRAVENOUS at 23:38

## 2023-03-16 RX ADMIN — PSYLLIUM HUSK 1 PACKET: 3.4 POWDER ORAL at 07:44

## 2023-03-16 RX ADMIN — METOPROLOL TARTRATE 25 MG: 25 TABLET, FILM COATED ORAL at 07:44

## 2023-03-16 ASSESSMENT — PAIN DESCRIPTION - LOCATION
LOCATION: BACK

## 2023-03-16 ASSESSMENT — PAIN SCALES - GENERAL
PAINLEVEL_OUTOF10: 4
PAINLEVEL_OUTOF10: 6
PAINLEVEL_OUTOF10: 4
PAINLEVEL_OUTOF10: 0
PAINLEVEL_OUTOF10: 0

## 2023-03-16 ASSESSMENT — PAIN DESCRIPTION - ORIENTATION: ORIENTATION: LOWER

## 2023-03-16 ASSESSMENT — PAIN DESCRIPTION - DESCRIPTORS: DESCRIPTORS: SHARP

## 2023-03-16 NOTE — PROGRESS NOTES
Department of Orthopedic Surgery  Spine Service  [place], [place] Progress Note        Subjective:    3/16/23  Deysi Nurse is resting in bed. She has back pain with movement. MRI thoracic and lumbar completed yesterday. No new compression fractures. Finding of possible hematoma at the T11-12 level. There is cord compression. She does admit to some loss of balance changes that she feels have become more prominent. Pain is likely due to chronic compression deformities, degenerative disc disease, and muscular strain. Dr. Lorena Aquino saw the patient as well this AM. For now plan is to monitor symptoms. Work with PT. Continue to hold eliquis. If she shows signs of worsening myelopathy, may have to consider surgery for decompression of the cord. Would like to avoid this as much as possible. If able to mobilize and tolerate pain, plan would be to discharge. Will monitor for a few days. Vitals  VITALS:  /86   Pulse 92   Temp 98.4 °F (36.9 °C) (Oral)   Resp 16   Ht 5' 9\" (1.753 m)   Wt 205 lb (93 kg)   SpO2 98%   BMI 30.27 kg/m²   24HR INTAKE/OUTPUT:    Intake/Output Summary (Last 24 hours) at 3/16/2023 0729  Last data filed at 3/15/2023 2226  Gross per 24 hour   Intake 560 ml   Output --   Net 560 ml     URINARY CATHETER OUTPUT (Joy):     DRAIN/TUBE OUTPUT:         PHYSICAL EXAM:    Orientation:  alert and oriented to person, place and time  Lower Extremity Motor :  quadriceps, extensor hallucis longus, dorsiflexion, plantarflexion 5/5 bilaterally  Lower Extremity Sensory:  Intact L1-S1    Flatus:  positive    ABNORMAL EXAM FINDINGS:  none    LABS:    HgB:    Lab Results   Component Value Date/Time    HGB 12.3 03/15/2023 03:31 AM         ASSESSMENT AND PLAN:    Thoracic T11-12 cord compression due to hematoma. 1:  PT/OT eval   2:  Continue to hold eliquis for possible intervention  3:  Hopeful plan to discharge once mobilizing well.

## 2023-03-16 NOTE — PROGRESS NOTES
Elia Nelson 60  INPATIENT OCCUPATIONAL THERAPY  [facility] MED SURG 8A  EVALUATION    Time:   Time In: 9448  Time Out: 1229  Timed Code Treatment Minutes: 8 Minutes  Minutes: 21          Date: 3/16/2023  Patient Name: Myrtle Sow,   Gender: female      MRN: [id number]  : 1950  (67 y.o.)  Referring Practitioner: Shobha Bear PA-C  Diagnosis: Closed compression fracture of L4 lumbar vertebra with delayed healing  Additional Pertinent Hx: Per H&P on 3/15: Gregorio Smith is a 31-year-old female who has been dealing with a lumbar pain and abdominal pain that initially began this past Saturday. She went to the emergency departments over the weekend and was discharged with a follow-up ortho outpatient without any improvement of her symptoms. advised her to return back to emergency department work-up with CT abdomen which demonstrated at L4 and L1 compression fracture. And be admitted under our service for further work-up. MRI thoracic and lumbar completed. No new compression fractures. Finding of possible hematoma at the T11-12 level. There is cord compression. Restrictions/Precautions:  Restrictions/Precautions: General Precautions, Fall Risk  Position Activity Restriction  Spinal Precautions: No Bending, No Lifting, No Twisting    Subjective  Chart Reviewed: Yes, Orders, Progress Notes, History and Physical, Imaging  Patient assessed for rehabilitation services?: Yes  Family / Caregiver Present: Yes (daughter)    Subjective: Pt seated in bed upon arrival, agreeable to OT session. Pt reporting pain is much better than yesterday. Comments: RN reported eliquis has been held in anticipation of potential surgical intervention early next week pending pain and any other symptoms. Ortho encouraging therapy until decision made re: surgery.     Pain: 6/10: lower back; remains 6/10 throughout entire session    Vitals: Vitals not assessed per clinical judgement, see nursing flowsheet    Social/Functional History:  Lives With: Spouse  Type of Home: House  Home Layout: One level  Home Access: Stairs to enter with rails  Entrance Stairs - Number of Steps: 2  Home Equipment: [employer], rolling   Bathroom Shower/Tub: Tub/Shower unit, Walk-in shower  Bathroom Toilet: Standard  Bathroom Equipment: Shower chair, Tub transfer bench, Commode       ADL Assistance: [address]: Independent  Ambulation Assistance: Independent  Transfer Assistance: Independent    Active : Yes     Additional Comments: Pt not using any AD prior to admission. VISION:WFL    HEARING:  WFL    COGNITION: WFL    RANGE OF MOTION:  Bilateral Upper Extremity:  WFL    STRENGTH:  Bilateral Upper Extremity:  WFL    SENSATION:   Denies any numbness/tingling    ADL:   No ADL's completed this session. .  **Reviewed LB ADL modifications to avoid exacerbating back pain. Reinforced avoiding bending towards floor/twisting and crossing 1 LE over the other to complete. BALANCE:  Sitting Balance:  Independent. Standing Balance: Stand By Assistance. BED MOBILITY:  Supine to Sit: Supervision, with head of bed flat, with rail, with verbal cues , with increased time for completion ; minimal cues for logroll technique  Scooting: Modified Independent      TRANSFERS:  Sit to Stand:  Supervision, with increased time for completion. Stand to Sit: Supervision, with increased time for completion. FUNCTIONAL MOBILITY:  Assistive Device: None  Assist Level:  Stand By Assistance and Contact Guard Assistance. Distance:  in room, progressing to short distance in hallway  Grossly steady although does occasionally sway laterally when walking. Functional mobility completed to increase overall activity tolerance needed for ADL tasks. **Reinforced recommendations of walking in hallway with staff 3x/day as able to tolerate.      Activity Tolerance:  Patient tolerance of  treatment: Good treatment tolerance       Assessment:  Assessment: Pt presents requiring increased assistance for ADLs, transfers, and functional mobility compared to PLOF. Pt will continue to benefit from OT services to improve independence with these tasks, in addition to overall strength/endurance to facilitate return to PLOF. Performance deficits / Impairments: Decreased functional mobility , Decreased ADL status, Decreased strength, Decreased endurance, Decreased balance, Decreased high-level IADLs  Prognosis: Good  REQUIRES OT FOLLOW-UP: Yes  Decision Making: Medium Complexity    Treatment Initiated: Treatment and education initiated within context of evaluation. Evaluation time included review of current medical information, gathering information related to past medical, social and functional history, completion of standardized testing, formal and informal observation of tasks, assessment of data and development of plan of care and goals. Treatment time included skilled education and facilitation of tasks to increase safety and independence with ADL's for improved functional independence and quality of life. Discharge Recommendations:  Home with assist PRN (depending on surgical intervention)    Patient Education:     Patient Education  Education Given To: Patient, Family  Education Provided: Role of Therapy, Plan of Care, Precautions, ADL Adaptive Strategies  Education Method: Demonstration, Verbal  Barriers to Learning: None  Education Outcome: Verbalized understanding, Demonstrated understanding    Equipment Recommendations:  Equipment Needed: No    Plan:  Times Per Week: 3-5x  Current Treatment Recommendations: Strengthening, Balance training, Functional mobility training, Endurance training, Patient/Caregiver education & training, Equipment evaluation, education, & procurement, Self-Care / ADL, Home management training. See long-term goal time frame for expected duration of plan of care.   If no long-term goals established, a short length of stay is anticipated. Goals:     Short Term Goals  Time Frame for Short Term Goals: by discharge  Short Term Goal 1: Pt will increase activity tolerance for functional mobility household distances with supervision in prep for ADL/IADL completion. Short Term Goal 2: Pt will complete LB ADLs/light IADLs with supervision and 0 cues for back precautions to increase independence with self care/homemaking tasks. Short Term Goal 3: Pt will tolerate dynamic standing >5 minutes with supervision in prep for sinkside grooming/showering tasks. Long Term Goals  Time Frame for Long Term Goals : not set due to ELOS         Following session, patient left in safe position with all fall risk precautions in place.

## 2023-03-16 NOTE — PROGRESS NOTES
Hospitalist Progress Note    Patient:  Kevin Au      Unit/Bed:8A-12/012-A    YOB: 1950    MRN: [id number]       Acct: [de-identified]     PCP: OLEG Royal CNP    Date of Admission: 3/14/2023    Assessment/Plan:    Back Pain: L4 and L1 compression fractures noted on CT  -Orthopedic spine primary, managing  -MRI T spine performed  -Analgesics per primary  -Possible surgical intervention  -Holding Eliquis/ASA  -Advance diet as tolerated    Osteoporosis: History of multiple compression fractures in her thoracic spine that were treated with kyphoplasty by Dr. Jon Marquis    Suspected Pancreatitis: Lipase 268.0 3/16/23   -MRCP obtained, no peripancreatic edema or inflammation per report  -Advance diet as tolerated  -Aggressive IVF  -Pain control per primary service    Pre-operative CV exam: (Per provider performing initial consult)  -Possible upcoming possible kyphoplasty, unclear date  (low risk surgery)  -will hold Eliquis  -No active cardiac complaints.    -Last ECG reviewed showing NSR  -Last TTE 7/26/17 reviewed showing EF 65% no valvular disease  -No prior LHC on file   -RCRI = 1 (ASA class II)  -Her background risk factors including atrial fibrillation and TAA increase her overall risk to intermediate, and the surgery itself is low risk risk.   -Able to do > 4 METS. -Patient accepts the risk of the planned procedure and understands the possibility of dwayne-operative cardiac event, including but not limited to MI, VT/VF, cardiac arrest, and death, and wishes to proceed with the procedure.  -No further cardiac testing prior to upcoming surgery. TAA: stable 4.1cm    Paroxysmal atrial fibrillation: [id number] 3 OAC on Eliquis. Rate controlled with metoprolol. Holding for possible intervention    Hypothyroidism: Continue Synthroid 25mcg    Chief Complaint: consulted for medical management      Subjective: 67 y.o. female admitted to the Orthopedic surgery service for back pain.  Patient denies chest pain. She denies SOB. She denies vomiting. She endorses BM. Medications:    Infusion Medications    sodium chloride 100 mL/hr at 03/15/23 1048    sodium chloride      lactated ringers IV soln Stopped (03/15/23 0049)     Scheduled Medications    psyllium  1 packet Oral Daily    sodium chloride flush  5-40 mL IntraVENous 2 times per day    [Held by provider] apixaban  5 mg Oral BID    [Held by provider] aspirin  81 mg Oral Daily    levothyroxine  25 mcg Oral Daily    metoprolol tartrate  25 mg Oral BID    pantoprazole  40 mg Oral Daily     PRN Meds: senna, sodium chloride flush, sodium chloride, ondansetron **OR** ondansetron, polyethylene glycol, morphine **OR** morphine, HYDROcodone 5 mg - acetaminophen **OR** HYDROcodone 5 mg - acetaminophen, cyclobenzaprine, nitroGLYCERIN      Intake/Output Summary (Last 24 hours) at 3/16/2023 0835  Last data filed at 3/15/2023 2226  Gross per 24 hour   Intake 440 ml   Output --   Net 440 ml       Diet:  ADULT DIET; Regular    Exam:  /72   Pulse 80   Temp 98.1 °F (36.7 °C) (Oral)   Resp 18   Ht 5' 9\" (1.753 m)   Wt 205 lb (93 kg)   SpO2 97%   BMI 30.27 kg/m²     Physical Exam  Constitutional:       Interventions: She is not intubated. Pulmonary:      Effort: She is not intubated. Neurological:      Mental Status: She is alert. Psychiatric:         Speech: She is communicative.           Labs:   Recent Labs     03/14/23  1441 03/15/23  0331 03/16/23  0641   WBC 5.5 4.3* 4.7*   HGB 13.6 12.3 12.4   HCT 40.9 37.5 38.1    129* 127*       Recent Labs     03/14/23  1441 03/16/23  0641    144   K 4.5 4.4    108   CO2 23 23   BUN 15 13   CREATININE 1.0 0.9   CALCIUM 9.2 8.3*       Recent Labs     03/14/23  1441 03/16/23  0641   AST 37  36 88*   ALT 31  31 63   BILIDIR <0.2 <0.2   BILITOT 0.7  0.7 0.6   ALKPHOS 88  87 154*       Recent Labs     03/15/23  0331   INR 1.31*       No results for input(s): Kandice Granda in the last 72 hours. Urinalysis:      Lab Results   Component Value Date/Time    NITRU NEGATIVE 03/14/2023 03:45 PM    WBCUA 10-15 03/14/2023 03:45 PM    BACTERIA NONE SEEN 03/14/2023 03:45 PM    RBCUA 0-2 03/14/2023 03:45 PM    BLOODU TRACE 03/14/2023 03:45 PM    GLUCOSEU NEGATIVE 03/14/2023 03:45 PM       Radiology:  MRI THORACIC SPINE W WO CONTRAST   Final Result       1. There is thoracic kyphosis. 2. There are compression deformities involving the T5, T6, T7, T8, T9, T10 and L1 vertebral bodies. The patient appears be status post instrumentation involving the T6, T7 and T10 vertebral bodies. 3. There is a 3.7 mm disc protrusion at T11-T12. There is a 2.4 x 0.8 cm dorsal extradural defect at this level which may be related to a dorsal epidural hematoma or possibly related to the left facet joint. . This results in moderate canal stenosis and    cord compression. This is new since remote MRI scan dated 29th of July 2015. 4. There is mild canal stenosis and indentation upon underlying spinal cord at T10-11 and T12-L1. 5. There is mild canal stenosis with no definite cord compression at T6-7. **This report has been created using voice recognition software. It may contain minor errors which are inherent in voice recognition technology. **      Final report electronically signed by DR Damián Bah on 3/15/2023 6:57 PM      MRI ABDOMEN W WO CONTRAST MRCP   Final Result   1. No peripancreatic edema or inflammation. Correlate with lipase levels for acute pancreatitis. 2. Segmental atrophy is seen in the liver with associated intrahepatic biliary dilatation. 3. Mild ascites. **This report has been created using voice recognition software. It may contain minor errors which are inherent in voice recognition technology. **      Final report electronically signed by Dr Ashli Vasquez on 3/15/2023 10:37 AM      MRI LUMBAR SPINE WO CONTRAST   Final Result       1.  Chronic compression fractures of L1 and L4.   2. Moderate to severe bilateral foraminal stenosis due to a combination of degenerative changes at the L3-4 level. 3. Dorsal epidural abnormality on the left in the lower thoracic spine at the T11-T12 level. A thoracic spine MRI with and without contrast is recommended for further evaluation. This appears to be causing severe spinal canal stenosis. **This report has been created using voice recognition software. It may contain minor errors which are inherent in voice recognition technology. **      Final report electronically signed by Dr. Paul Farah on 3/15/2023 10:08 AM      XR CHEST PORTABLE   Final Result   1. Poor inflation the lungs. Moderate left anterior obliquity of the patient. Mildly ectatic and tortuous descending thoracic aorta and aortic arch. 2. Mild atelectatic/fibrotic stranding left lung base and along right hemidiaphragm. . No effusion seen. **This report has been created using voice recognition software. It may contain minor errors which are inherent in voice recognition technology. **      Final report electronically signed by Dr. Harry An on 3/14/2023 4:41 PM      XR ABDOMEN (KUB) (SINGLE AP VIEW)   Final Result   1. Nonobstructive bowel gas pattern. 2. Large amount of retained stool in the colon which may represent constipation. **This report has been created using voice recognition software. It may contain minor errors which are inherent in voice recognition technology. **      Final report electronically signed by Dr Jonathon Bateman on 3/14/2023 4:04 PM          Diet: ADULT DIET;  Regular    DVT prophylaxis: [] Lovenox                                 [] SCDs                                 [] SQ Heparin                                 [] Encourage ambulation           [] Already on Anticoagulation     Disposition:    [x] Home       [] TCU       [] Rehab       [] Psych       [] SNF       [] [place]       [] Other-    Code Status: Full Code    PT/OT Eval:         Electronically signed by Byron Villarreal PA-C on 3/16/2023 at 8:35 AM

## 2023-03-17 LAB
ALBUMIN SERPL BCG-MCNC: 3.5 G/DL (ref 3.5–5.1)
ALP SERPL-CCNC: 123 U/L (ref 38–126)
ALT SERPL W/O P-5'-P-CCNC: 40 U/L (ref 11–66)
ANION GAP SERPL CALC-SCNC: 8 MEQ/L (ref 8–16)
AST SERPL-CCNC: 39 U/L (ref 5–40)
BILIRUB CONJ SERPL-MCNC: < 0.2 MG/DL (ref 0–0.3)
BILIRUB SERPL-MCNC: 0.5 MG/DL (ref 0.3–1.2)
BUN SERPL-MCNC: 11 MG/DL (ref 7–22)
CALCIUM SERPL-MCNC: 8.4 MG/DL (ref 8.5–10.5)
CHLORIDE SERPL-SCNC: 108 MEQ/L (ref 98–111)
CO2 SERPL-SCNC: 26 MEQ/L (ref 23–33)
CREAT SERPL-MCNC: 1.1 MG/DL (ref 0.4–1.2)
GFR SERPL CREATININE-BSD FRML MDRD: 53 ML/MIN/1.73M2
GLUCOSE SERPL-MCNC: 102 MG/DL (ref 70–108)
LIPASE SERPL-CCNC: 64.4 U/L (ref 5.6–51.3)
POTASSIUM SERPL-SCNC: 4.2 MEQ/L (ref 3.5–5.2)
PROT SERPL-MCNC: 5.3 G/DL (ref 6.1–8)
SODIUM SERPL-SCNC: 142 MEQ/L (ref 135–145)

## 2023-03-17 PROCEDURE — 99231 SBSQ HOSP IP/OBS SF/LOW 25: CPT | Performed by: PHYSICIAN ASSISTANT

## 2023-03-17 PROCEDURE — 6370000000 HC RX 637 (ALT 250 FOR IP): Performed by: STUDENT IN AN ORGANIZED HEALTH CARE EDUCATION/TRAINING PROGRAM

## 2023-03-17 PROCEDURE — 97530 THERAPEUTIC ACTIVITIES: CPT

## 2023-03-17 PROCEDURE — 97116 GAIT TRAINING THERAPY: CPT

## 2023-03-17 PROCEDURE — 36415 COLL VENOUS BLD VENIPUNCTURE: CPT

## 2023-03-17 PROCEDURE — 6370000000 HC RX 637 (ALT 250 FOR IP): Performed by: PHYSICIAN ASSISTANT

## 2023-03-17 PROCEDURE — 83690 ASSAY OF LIPASE: CPT

## 2023-03-17 PROCEDURE — 80076 HEPATIC FUNCTION PANEL: CPT

## 2023-03-17 PROCEDURE — 80048 BASIC METABOLIC PNL TOTAL CA: CPT

## 2023-03-17 PROCEDURE — 2580000003 HC RX 258: Performed by: PHYSICIAN ASSISTANT

## 2023-03-17 PROCEDURE — 97110 THERAPEUTIC EXERCISES: CPT

## 2023-03-17 PROCEDURE — 1200000003 HC TELEMETRY R&B

## 2023-03-17 PROCEDURE — 97162 PT EVAL MOD COMPLEX 30 MIN: CPT

## 2023-03-17 PROCEDURE — 2580000003 HC RX 258: Performed by: STUDENT IN AN ORGANIZED HEALTH CARE EDUCATION/TRAINING PROGRAM

## 2023-03-17 RX ADMIN — METOPROLOL TARTRATE 25 MG: 25 TABLET, FILM COATED ORAL at 20:45

## 2023-03-17 RX ADMIN — SODIUM CHLORIDE, POTASSIUM CHLORIDE, SODIUM LACTATE AND CALCIUM CHLORIDE: 600; 310; 30; 20 INJECTION, SOLUTION INTRAVENOUS at 10:23

## 2023-03-17 RX ADMIN — SODIUM CHLORIDE, POTASSIUM CHLORIDE, SODIUM LACTATE AND CALCIUM CHLORIDE: 600; 310; 30; 20 INJECTION, SOLUTION INTRAVENOUS at 05:11

## 2023-03-17 RX ADMIN — NALOXEGOL OXALATE 25 MG: 25 TABLET, FILM COATED ORAL at 05:07

## 2023-03-17 RX ADMIN — SODIUM CHLORIDE, POTASSIUM CHLORIDE, SODIUM LACTATE AND CALCIUM CHLORIDE: 600; 310; 30; 20 INJECTION, SOLUTION INTRAVENOUS at 16:14

## 2023-03-17 RX ADMIN — CYCLOBENZAPRINE 10 MG: 10 TABLET, FILM COATED ORAL at 20:50

## 2023-03-17 RX ADMIN — SODIUM CHLORIDE, PRESERVATIVE FREE 10 ML: 5 INJECTION INTRAVENOUS at 09:50

## 2023-03-17 RX ADMIN — CYCLOBENZAPRINE 10 MG: 10 TABLET, FILM COATED ORAL at 04:16

## 2023-03-17 RX ADMIN — PANTOPRAZOLE SODIUM 40 MG: 40 TABLET, DELAYED RELEASE ORAL at 05:07

## 2023-03-17 RX ADMIN — METOPROLOL TARTRATE 25 MG: 25 TABLET, FILM COATED ORAL at 09:49

## 2023-03-17 RX ADMIN — LEVOTHYROXINE SODIUM 25 MCG: 0.03 TABLET ORAL at 05:08

## 2023-03-17 ASSESSMENT — PAIN DESCRIPTION - ORIENTATION
ORIENTATION: LOWER
ORIENTATION: LOWER

## 2023-03-17 ASSESSMENT — PAIN SCALES - GENERAL
PAINLEVEL_OUTOF10: 3
PAINLEVEL_OUTOF10: 0
PAINLEVEL_OUTOF10: 4
PAINLEVEL_OUTOF10: 0

## 2023-03-17 ASSESSMENT — PAIN DESCRIPTION - LOCATION
LOCATION: BACK
LOCATION: BACK

## 2023-03-17 ASSESSMENT — PAIN - FUNCTIONAL ASSESSMENT
PAIN_FUNCTIONAL_ASSESSMENT: ACTIVITIES ARE NOT PREVENTED
PAIN_FUNCTIONAL_ASSESSMENT: ACTIVITIES ARE NOT PREVENTED

## 2023-03-17 ASSESSMENT — PAIN DESCRIPTION - DESCRIPTORS
DESCRIPTORS: SHARP
DESCRIPTORS: ACHING

## 2023-03-17 NOTE — PROGRESS NOTES
Department of Orthopedic Surgery  Spine Service  [place], [place] Progress Note        Subjective:    3/16/23  Jesus Funk is resting in bed. She has back pain with movement. MRI thoracic and lumbar completed yesterday. No new compression fractures. Finding of possible hematoma at the T11-12 level. There is cord compression. She does admit to some loss of balance changes that she feels have become more prominent. Pain is likely due to chronic compression deformities, degenerative disc disease, and muscular strain. Dr. Diona Burkitt saw the patient as well this AM. For now plan is to monitor symptoms. Work with PT. Continue to hold eliquis. If she shows signs of worsening myelopathy, may have to consider surgery for decompression of the cord. Would like to avoid this as much as possible. If able to mobilize and tolerate pain, plan would be to discharge. Will monitor for a few days. 3/17/23  Jesus Funk is resting in the chair doing well. Pain improved. Walked with therapy yesterday and did well. Plan to continue with PT/OT today. If trending better. Plan to discharge home possibly tomorrow. Vitals  VITALS:  /78   Pulse 68   Temp 98.6 °F (37 °C) (Oral)   Resp 16   Ht 5' 9\" (1.753 m)   Wt 205 lb (93 kg)   SpO2 94%   BMI 30.27 kg/m²   24HR INTAKE/OUTPUT:  No intake or output data in the 24 hours ending 03/17/23 0758    URINARY CATHETER OUTPUT (Joy):     DRAIN/TUBE OUTPUT:         PHYSICAL EXAM:    Orientation:  alert and oriented to person, place and time  Lower Extremity Motor :  quadriceps, extensor hallucis longus, dorsiflexion, plantarflexion 5/5 bilaterally  Lower Extremity Sensory:  Intact L1-S1    Flatus:  positive    ABNORMAL EXAM FINDINGS:  none    LABS:    HgB:    Lab Results   Component Value Date/Time    HGB 12.4 03/16/2023 06:41 AM         ASSESSMENT AND PLAN:    Thoracic T11-12 cord compression due to hematoma.      1:  PT/OT eval   2:  Continue to hold eliquis for possible intervention  3:  Hopeful plan to discharge once mobilizing well.

## 2023-03-17 NOTE — PROGRESS NOTES
[address] [address]  INPATIENT PHYSICAL THERAPY  EVALUATION  [facility] MED SURG 8A - 8A-12/012-A    Time In: 8331  Time Out: 1450  Timed Code Treatment Minutes: 13 Minutes  Minutes: 23          Date: 3/17/2023  Patient Name: Lucio Arambula,  Gender:  female        MRN: [id number]  : 1950  (67 y.o.)      Referring Practitioner: Daisy Wall PA-C  Diagnosis: Intractable low back pain  Additional Pertinent Hx: Per EMR Magda Orellana is a 67 y.o. female who presents to the emergency department for evaluation of back pain. The patient states that 4 days ago she was eating pizza, developed a stomachache that wrapped around to her back, which was characterized as \"stabbing,\" and she had fecal urgency. She went to go to the bathroom and then developed back pain which prompted her to go to the emergency department. She had a CTA of her chest abdomen and pelvis which revealed an L4 compression fracture. She has been taking Percocet at home with some improvement of her symptoms. She reports her pain is worse with movement and better with rest.  She has not yet followed up with Dr. Natanael Alonso office, but they called the office today because she was having intractable back pain, and they told her to go to the emergency department and potentially have an MRI. \"     Restrictions/Precautions:  Restrictions/Precautions: General Precautions, Fall Risk  Position Activity Restriction  Spinal Precautions: No Bending, No Lifting, No Twisting  Other position/activity restrictions: L1 and L4 compression fx, no kyphoplasty completed    Subjective:  Chart Reviewed: Yes  Patient assessed for rehabilitation services?: Yes  Family / Caregiver Present: No  Subjective: OK to see pt per nursing. Pt is resting in bedside chair and is agreeable to PT. Patient expresses readiness to return home and follow-up with outpatient physical therapy. Pt believes she would benefit from PT to improve her balance as well.  Patient reports mild low back pain at rest. Pt has a good understanding of spinal ROM precautions. General:  Overall Orientation Status: Within Normal Limits  Overall Cognitive Status: WNL  Vision: Impaired  Vision Exceptions: Wears glasses at all times  Hearing: Within functional limits       Pain: Not rated, pt reports some mild back pain    Vitals: Vitals not assessed per clinical judgement, see nursing flowsheet    Social/Functional History:    Lives With: Spouse  Type of Home: House  Home Layout: One level  Home Access: Stairs to enter with rails  Entrance Stairs - Number of Steps: 2  Entrance Stairs - Rails: Right  Home Equipment: Gayl Nice, rolling     Bathroom Shower/Tub: Tub/Shower unit, Walk-in shower  Bathroom Toilet: Handicap height  Bathroom Equipment: Shower chair, Tub transfer bench, Commode  Bathroom Accessibility: Accessible       ADL Assistance: Independent  Homemaking Assistance: Independent  Ambulation Assistance: Independent  Transfer Assistance: Independent    Active : Yes  Mode of Transportation: [facility]     Additional Comments: Pt not using any AD prior to admission. OBJECTIVE:  Range of Motion:  Bilateral Lower Extremity: WFL    Strength:  Bilateral Lower Extremity: WFL    Balance:  Static Sitting Balance:  Independent  Static Standing Balance: Supervision  Dynamic Standing Balance: Stand By Assistance, reaching for personal items    Bed Mobility:  Not Tested    Transfers:  Sit to Stand: Stand By Assistance, to/from chair with arms  Stand to Sit:Stand By Assistance, to/from chair with arms    Ambulation:  Stand By Assistance, [employer] Assistance  Distance: 420 ft  Surface: Level Tile  Device:No Device  Gait Deviations: Forward Flexed Posture  Pt initially demonstrates some unsteadiness, however resolved as pt walked further.     Stairs:  Contact Guard Assistance, X 1  Number of Steps: Ascended/descended 3  Height: 6\" step with One Handrail (right)  Pt ascended/descended stairs with reciprocal stepping    Exercise:  Patient was guided in 1 set(s) 15 reps of exercise to both lower extremities. Ankle pumps, Glut sets, Hip abduction/adduction, Straight leg raises, and Long arc quads. Exercises were completed for increased independence with functional mobility. Pt required VC and visual cues for proper technique of exercises with good demo. Functional Outcome Measures: Completed  AM-PAC Inpatient Mobility Raw Score : 19  AM-PAC Inpatient T-Scale Score : 45.44    ASSESSMENT:  Activity Tolerance:  Patient tolerance of treatment: good. Treatment Initiated: Treatment and education initiated within context of evaluation. Evaluation time included review of current medical information, gathering information related to past medical, social and functional history, completion of standardized testing, formal and informal observation of tasks, assessment of data and development of plan of care and goals. Treatment time included skilled education and facilitation of tasks to increase safety and independence with functional mobility for improved independence and quality of life. Assessment: Body Structures, Functions, Activity Limitations Requiring Skilled Therapeutic Intervention: Decreased ROM, Decreased strength, Decreased balance, Decreased endurance, Increased pain, Decreased posture, Decreased functional mobility   Assessment: Jerson Pastor is a 67 y.o. female who presents with the deficits stated previously. Pt requires 1 person stand by assistance/contact guard assist for functional tasks. Pt cont to require skilled PT services to increase IND with functional tasks and progress towards PLOF to return to home environment safely. Therapy Prognosis: Good  Requires PT Follow-Up: Yes    Discharge Recommendations:  Discharge Recommendations: Therapy recommended at discharge, Patient would benefit from continued therapy after discharge, Home with assist PRN, Outpatient PT    Patient Education:      . Patient Education  Education Given To: Patient  Education Provided: Role of Therapy, Plan of Care, Home Exercise Program, Precautions, Transfer Training, Energy Conservation, Fall Prevention Strategies  Education Method: Verbal  Barriers to Learning: None  Education Outcome: Verbalized understanding, Demonstrated understanding, Continued education needed       Equipment Recommendations:  Equipment Needed: No    Plan:  Current Treatment Recommendations: Strengthening, ROM, Balance training, Functional mobility training, Transfer training, Endurance training, Neuromuscular re-education, Stair training, Gait training, Home exercise program, Safety education & training, Patient/Caregiver education & training, Therapeutic activities  General Plan:  (3-5x O)    Goals:  Patient Goals : \"Get balance stronger\"  Short Term Goals  Time Frame for Short Term Goals: By discharge  Short Term Goal 1: Pt to demonstrate safe transfer into/out of car with S for safety within the community  Short Term Goal 2: Pt to perform supine to/from sit transfer independently to return home  Short Term Goal 3: Pt to complete sit to/from stand transfer independently for safety within the home  Short Term Goal 4: Pt to tolerate 10-20 reps of ther ex for improved mobility  Short Term Goal 5: Pt to ascend/descend 4 steps independently for entrance into home  Additional Goals?: Yes  Short Term Goal 6: Pt to amb 150 ft independently to return home  Long Term Goals  Time Frame for Long Term Goals : NA due to ELOS    Following session, patient left in safe position with all fall risk precautions in place. Pt in bedside chair following session, all needs and call light in reach, alarm on.

## 2023-03-17 NOTE — CARE COORDINATION
Potential discharge over the weekend. Spoke with patient. She would prefer that if she is to continue therapy, to complete it at [facility]. Orders received from provider. Patient to schedule therapy. 3/17/23, 1:52 PM EDT    Patient goals/plan/ treatment preferences discussed by  and . Patient goals/plan/ treatment preferences reviewed with patient/ family. Patient/ family verbalize understanding of discharge plan and are in agreement with goal/plan/treatment preferences. Understanding was demonstrated using the teach back method. AVS provided by RN at time of discharge, which includes all necessary medical information pertaining to the patients current course of illness, treatment, post-discharge goals of care, and treatment preferences. Services At/After Discharge:  Outpatient and OT       IMM Letter  IMM Letter given to Patient/Family/Significant other/Guardian/POA/by[de-identified] [facility] FOR PSYCHIATRY RN CM  IMM Letter date given[de-identified] 03/17/23  IMM Letter time given[de-identified] 2213

## 2023-03-17 NOTE — PROGRESS NOTES
[facility]  [facility] MED SURG 8A  Occupational Therapy  Daily Note  Time:   Time In: 1056  Time Out: 1120  Timed Code Treatment Minutes: 24 Minutes  Minutes: 24          Date: 3/17/2023  Patient Name: Joshua Christy,   Gender: female      Room: [facility]12/012-A  MRN: [id number]  : 1950  (72 y.o.)  Referring Practitioner: Geovany Farfan PA-C  Diagnosis: Closed compression fracture of L4 lumbar vertebra with delayed healing  Additional Pertinent Hx: Per H&P on 3/15: Joshua is a 72-year-old female who has been dealing with a lumbar pain and abdominal pain that initially began this past Saturday. She went to the emergency departments over the weekend and was discharged with a follow-up ortho outpatient without any improvement of her symptoms.  advised her to return back to emergency department work-up with CT abdomen which demonstrated at L4 and L1 compression fracture.  And be admitted under our service for further work-up.MRI thoracic and lumbar completed.  No new compression fractures. Finding of possible hematoma at the T11-12 level. There is cord compression.    Restrictions/Precautions:  Restrictions/Precautions: General Precautions, Fall Risk  Position Activity Restriction  Spinal Precautions: No Bending, No Lifting, No Twisting     SUBJECTIVE: RN okayed OT session. Upon arrival patient sitting up in bed visiting with daughter. Pt was agreeable to OT session.     PAIN: 0/10: Pt denies     Vitals: Vitals not assessed per clinical judgement, see nursing flowsheet    COGNITION: WFL    ADL:   No ADL's completed this session.    BALANCE:  Sitting Balance:  Independent. Sitting EOB.   Standing Balance: Stand By Assistance. With 2 UE release.     BED MOBILITY:  Supine to Sit: Modified Independent    Sit to Supine: Modified Independent    Scooting: Modified Independent      TRANSFERS:  Sit to Stand:  Stand By Assistance.    Stand to Sit: Stand By Assistance.      FUNCTIONAL MOBILITY:  Assistive Device:  None  Assist Level:  Stand By Assistance. Distance:  in room and hallway   Slow pace, swaying at times      Pt has h/o lymphedema and reports she completes her own wraps and massages to self. Pt encouraged to complete to decrease edema in B LEs. ASSESSMENT:   Activity Tolerance:  Patient tolerance of  treatment: Good treatment tolerance      Discharge Recommendations:  Outpatient PT for Back  Equipment Recommendations: Equipment Needed: No  Plan: Times Per Week: 3-5x  Current Treatment Recommendations: Strengthening, Balance training, Functional mobility training, Endurance training, Patient/Caregiver education & training, Equipment evaluation, education, & procurement, Self-Care / ADL, Home management training    Patient Education  Patient Education: Role of OT, Plan of Care, and Importance of Increasing Activity    Goals  Short Term Goals  Time Frame for Short Term Goals: by discharge  Short Term Goal 1: Pt will increase activity tolerance for functional mobility household distances with supervision in prep for ADL/IADL completion. Short Term Goal 2: Pt will complete LB ADLs/light IADLs with supervision and 0 cues for back precautions to increase independence with self care/homemaking tasks. Short Term Goal 3: Pt will tolerate dynamic standing >5 minutes with supervision in prep for sinkside grooming/showering tasks. Long Term Goals  Time Frame for Long Term Goals : not set due to ELOS    Following session, patient left in safe position with all fall risk precautions in place.

## 2023-03-17 NOTE — PROGRESS NOTES
Hospitalist Progress Note    Patient:  Franco Perez      Unit/Bed:8A-12/012-A    YOB: 1950    MRN: [id number]       Acct: [de-identified]     PCP: OLEG Gomez - CNP    Date of Admission: 3/14/2023    Assessment/Plan:    Back Pain: L4 and L1 compression fractures noted on CT  -Orthopedic spine primary, managing  -MRI T spine performed  -Analgesics per primary  -Possible surgical intervention  -Holding Eliquis/ASA  -Advance diet as tolerated    Osteoporosis: History of multiple compression fractures in her thoracic spine that were treated with kyphoplasty by Dr. Noel Restrepo    Suspected Pancreatitis: Lipase 268.0 3/16/23   -MRCP obtained, no peripancreatic edema or inflammation per report  -Advance diet as tolerated  -Aggressive IVF  -Pain control per primary service    Pre-operative CV exam: (Per provider performing initial consult)  -Possible upcoming possible kyphoplasty, unclear date  (low risk surgery)  -will hold Eliquis  -No active cardiac complaints.    -Last ECG reviewed showing NSR  -Last TTE 7/26/17 reviewed showing EF 65% no valvular disease  -No prior LHC on file   -RCRI = 1 (ASA class II)  -Her background risk factors including atrial fibrillation and TAA increase her overall risk to intermediate, and the surgery itself is low risk risk.   -Able to do > 4 METS. -Patient accepts the risk of the planned procedure and understands the possibility of dwayne-operative cardiac event, including but not limited to MI, VT/VF, cardiac arrest, and death, and wishes to proceed with the procedure.  -No further cardiac testing prior to upcoming surgery. TAA: stable 4.1cm    Paroxysmal atrial fibrillation: [id number] 3 OAC on Eliquis. Rate controlled with metoprolol. Holding for possible intervention    Hypothyroidism: Continue Synthroid 25mcg    Hospitalist service will sign off. Thank you for the consult.     Chief Complaint: consulted for medical management      Subjective: 67 y.o. female admitted to the Orthopedic surgery service for back pain. Patient denies chest pain. She denies SOB. She denies vomiting. She endorses 2 BM's. Medications:    Infusion Medications    sodium chloride Stopped (03/16/23 1500)    sodium chloride      lactated ringers IV soln 200 mL/hr at 03/17/23 2227     Scheduled Medications    naloxegol  25 mg Oral QAM AC    psyllium  1 packet Oral Daily    sodium chloride flush  5-40 mL IntraVENous 2 times per day    [Held by provider] apixaban  5 mg Oral BID    [Held by provider] aspirin  81 mg Oral Daily    levothyroxine  25 mcg Oral Daily    metoprolol tartrate  25 mg Oral BID    pantoprazole  40 mg Oral Daily     PRN Meds: senna, sodium chloride flush, sodium chloride, ondansetron **OR** ondansetron, polyethylene glycol, morphine **OR** morphine, HYDROcodone 5 mg - acetaminophen **OR** HYDROcodone 5 mg - acetaminophen, cyclobenzaprine, nitroGLYCERIN    No intake or output data in the 24 hours ending 03/17/23 0909      Diet:  ADULT DIET; Regular    Exam:  /78   Pulse 68   Temp 98.6 °F (37 °C) (Oral)   Resp 16   Ht 5' 9\" (1.753 m)   Wt 205 lb (93 kg)   SpO2 94%   BMI 30.27 kg/m²     Physical Exam  Constitutional:       Interventions: She is not intubated. Cardiovascular:      Rate and Rhythm: Normal rate and regular rhythm. Extrasystoles are present. Pulmonary:      Effort: She is not intubated. Neurological:      Mental Status: She is alert. Psychiatric:         Speech: She is communicative.           Labs:   Recent Labs     03/14/23  1441 03/15/23  0331 03/16/23  0641   WBC 5.5 4.3* 4.7*   HGB 13.6 12.3 12.4   HCT 40.9 37.5 38.1    129* 127*       Recent Labs     03/14/23  1441 03/16/23  0641    144   K 4.5 4.4    108   CO2 23 23   BUN 15 13   CREATININE 1.0 0.9   CALCIUM 9.2 8.3*       Recent Labs     03/14/23  1441 03/16/23  0641   AST 37  36 88*   ALT 31  31 63   BILIDIR <0.2 <0.2   BILITOT 0.7  0.7 0.6   ALKPHOS 88  87 154* Recent Labs     03/15/23  0331   INR 1.31*       No results for input(s): [facility] in the last 72 hours. Urinalysis:      Lab Results   Component Value Date/Time    NITRU NEGATIVE 03/14/2023 03:45 PM    WBCUA 10-15 03/14/2023 03:45 PM    BACTERIA NONE SEEN 03/14/2023 03:45 PM    RBCUA 0-2 03/14/2023 03:45 PM    BLOODU TRACE 03/14/2023 03:45 PM    GLUCOSEU NEGATIVE 03/14/2023 03:45 PM       Radiology:  MRI THORACIC SPINE W WO CONTRAST   Final Result       1. There is thoracic kyphosis. 2. There are compression deformities involving the T5, T6, T7, T8, T9, T10 and L1 vertebral bodies. The patient appears be status post instrumentation involving the T6, T7 and T10 vertebral bodies. 3. There is a 3.7 mm disc protrusion at T11-T12. There is a 2.4 x 0.8 cm dorsal extradural defect at this level which may be related to a dorsal epidural hematoma or possibly related to the left facet joint. . This results in moderate canal stenosis and    cord compression. This is new since remote MRI scan dated 29th of July 2015. 4. There is mild canal stenosis and indentation upon underlying spinal cord at T10-11 and T12-L1. 5. There is mild canal stenosis with no definite cord compression at T6-7. **This report has been created using voice recognition software. It may contain minor errors which are inherent in voice recognition technology. **      Final report electronically signed by DR Gabino Tate on 3/15/2023 6:57 PM      MRI ABDOMEN W WO CONTRAST MRCP   Final Result   1. No peripancreatic edema or inflammation. Correlate with lipase levels for acute pancreatitis. 2. Segmental atrophy is seen in the liver with associated intrahepatic biliary dilatation. 3. Mild ascites. **This report has been created using voice recognition software. It may contain minor errors which are inherent in voice recognition technology. **      Final report electronically signed by Dr Fabrizio Pacheco on 3/15/2023 10:37 AM      MRI LUMBAR SPINE WO CONTRAST   Final Result       1. Chronic compression fractures of L1 and L4.   2. Moderate to severe bilateral foraminal stenosis due to a combination of degenerative changes at the L3-4 level. 3. Dorsal epidural abnormality on the left in the lower thoracic spine at the T11-T12 level. A thoracic spine MRI with and without contrast is recommended for further evaluation. This appears to be causing severe spinal canal stenosis. **This report has been created using voice recognition software. It may contain minor errors which are inherent in voice recognition technology. **      Final report electronically signed by Dr. Ochoa Davis on 3/15/2023 10:08 AM      XR CHEST PORTABLE   Final Result   1. Poor inflation the lungs. Moderate left anterior obliquity of the patient. Mildly ectatic and tortuous descending thoracic aorta and aortic arch. 2. Mild atelectatic/fibrotic stranding left lung base and along right hemidiaphragm. . No effusion seen. **This report has been created using voice recognition software. It may contain minor errors which are inherent in voice recognition technology. **      Final report electronically signed by Dr. Delphine Lara on 3/14/2023 4:41 PM      XR ABDOMEN (KUB) (SINGLE AP VIEW)   Final Result   1. Nonobstructive bowel gas pattern. 2. Large amount of retained stool in the colon which may represent constipation. **This report has been created using voice recognition software. It may contain minor errors which are inherent in voice recognition technology. **      Final report electronically signed by Dr Xavier Rhodes on 3/14/2023 4:04 PM          Diet: ADULT DIET;  Regular    DVT prophylaxis: [] Lovenox                                 [] SCDs                                 [] SQ Heparin                                 [] Encourage ambulation           [] Already on Anticoagulation     Disposition:    [x] Home       [] TCU       [] Rehab       [] Psych       [] SNF       [] [place]       [] Other-    Code Status: Full Code    PT/OT Eval:         Electronically signed by Fabiola Diaz PA-C on 3/17/2023 at 9:09 AM

## 2023-03-18 VITALS
TEMPERATURE: 98.1 F | HEIGHT: 69 IN | RESPIRATION RATE: 16 BRPM | DIASTOLIC BLOOD PRESSURE: 69 MMHG | SYSTOLIC BLOOD PRESSURE: 137 MMHG | BODY MASS INDEX: 30.36 KG/M2 | OXYGEN SATURATION: 97 % | WEIGHT: 205 LBS | HEART RATE: 72 BPM

## 2023-03-18 LAB — LIPASE SERPL-CCNC: 45.8 U/L (ref 5.6–51.3)

## 2023-03-18 PROCEDURE — 6370000000 HC RX 637 (ALT 250 FOR IP): Performed by: STUDENT IN AN ORGANIZED HEALTH CARE EDUCATION/TRAINING PROGRAM

## 2023-03-18 PROCEDURE — 6370000000 HC RX 637 (ALT 250 FOR IP): Performed by: PHYSICIAN ASSISTANT

## 2023-03-18 PROCEDURE — 36415 COLL VENOUS BLD VENIPUNCTURE: CPT

## 2023-03-18 PROCEDURE — 2580000003 HC RX 258: Performed by: PHYSICIAN ASSISTANT

## 2023-03-18 PROCEDURE — 2580000003 HC RX 258

## 2023-03-18 PROCEDURE — 83690 ASSAY OF LIPASE: CPT

## 2023-03-18 RX ORDER — SODIUM CHLORIDE, SODIUM LACTATE, POTASSIUM CHLORIDE, CALCIUM CHLORIDE 600; 310; 30; 20 MG/100ML; MG/100ML; MG/100ML; MG/100ML
INJECTION, SOLUTION INTRAVENOUS CONTINUOUS
Status: DISCONTINUED | OUTPATIENT
Start: 2023-03-18 | End: 2023-03-18 | Stop reason: HOSPADM

## 2023-03-18 RX ORDER — CYCLOBENZAPRINE HCL 10 MG
10 TABLET ORAL 3 TIMES DAILY PRN
Qty: 50 TABLET | Refills: 0 | Status: SHIPPED | OUTPATIENT
Start: 2023-03-18

## 2023-03-18 RX ADMIN — SODIUM CHLORIDE, POTASSIUM CHLORIDE, SODIUM LACTATE AND CALCIUM CHLORIDE: 600; 310; 30; 20 INJECTION, SOLUTION INTRAVENOUS at 05:21

## 2023-03-18 RX ADMIN — METOPROLOL TARTRATE 25 MG: 25 TABLET, FILM COATED ORAL at 08:40

## 2023-03-18 RX ADMIN — SODIUM CHLORIDE, PRESERVATIVE FREE 10 ML: 5 INJECTION INTRAVENOUS at 08:41

## 2023-03-18 RX ADMIN — NALOXEGOL OXALATE 25 MG: 25 TABLET, FILM COATED ORAL at 05:18

## 2023-03-18 RX ADMIN — POLYETHYLENE GLYCOL 3350 17 G: 17 POWDER, FOR SOLUTION ORAL at 08:42

## 2023-03-18 RX ADMIN — PANTOPRAZOLE SODIUM 40 MG: 40 TABLET, DELAYED RELEASE ORAL at 05:18

## 2023-03-18 RX ADMIN — LEVOTHYROXINE SODIUM 25 MCG: 0.03 TABLET ORAL at 05:18

## 2023-03-18 ASSESSMENT — PAIN SCALES - GENERAL
PAINLEVEL_OUTOF10: 0
PAINLEVEL_OUTOF10: 0

## 2023-03-18 NOTE — PROGRESS NOTES
Department of Orthopedic Surgery  Spine Service  [place], [place] Progress Note        Subjective:    3/16/23  Steven Medellin is resting in bed. She has back pain with movement. MRI thoracic and lumbar completed yesterday. No new compression fractures. Finding of possible hematoma at the T11-12 level. There is cord compression. She does admit to some loss of balance changes that she feels have become more prominent. Pain is likely due to chronic compression deformities, degenerative disc disease, and muscular strain. Dr. Slick Ren saw the patient as well this AM. For now plan is to monitor symptoms. Work with PT. Continue to hold eliquis. If she shows signs of worsening myelopathy, may have to consider surgery for decompression of the cord. Would like to avoid this as much as possible. If able to mobilize and tolerate pain, plan would be to discharge. Will monitor for a few days. 3/17/23  tSeven Medellin is resting in the chair doing well. Pain improved. Walked with therapy yesterday and did well. Plan to continue with PT/OT today. If trending better. Plan to discharge home possibly tomorrow. 3/18/23  Steven Medellin is resting in the chair. Doing well. Pain controlled. Walking well. Ready to go home today. O/p therapy ordered.      Vitals  VITALS:  /69   Pulse 72   Temp 98.1 °F (36.7 °C) (Oral)   Resp 16   Ht 5' 9\" (1.753 m)   Wt 205 lb (93 kg)   SpO2 97%   BMI 30.27 kg/m²   24HR INTAKE/OUTPUT:    Intake/Output Summary (Last 24 hours) at 3/18/2023 0959  Last data filed at 3/17/2023 1113  Gross per 24 hour   Intake 350 ml   Output --   Net 350 ml       URINARY CATHETER OUTPUT (Joy):     DRAIN/TUBE OUTPUT:         PHYSICAL EXAM:    Orientation:  alert and oriented to person, place and time  Lower Extremity Motor :  quadriceps, extensor hallucis longus, dorsiflexion, plantarflexion 5/5 bilaterally  Lower Extremity Sensory:  Intact L1-S1    Flatus:  positive    ABNORMAL EXAM FINDINGS:  none    LABS:    HgB:    Lab Results Component Value Date/Time    HGB 12.4 03/16/2023 06:41 AM         ASSESSMENT AND PLAN:    Thoracic T11-12 cord compression due to hematoma. 1:  PT/OT eval   2:  Restart eliquis  3:  Discharge home today.

## 2023-03-18 NOTE — PLAN OF CARE
Problem: Discharge Planning  Goal: Discharge to home or other facility with appropriate resources  Flowsheets (Taken 3/18/2023 0026)  Discharge to home or other facility with appropriate resources:   Identify barriers to discharge with patient and caregiver   Arrange for needed discharge resources and transportation as appropriate   Identify discharge learning needs (meds, wound care, etc)     Problem: Pain  Goal: Verbalizes/displays adequate comfort level or baseline comfort level  Flowsheets (Taken 3/18/2023 0026)  Verbalizes/displays adequate comfort level or baseline comfort level:   Encourage patient to monitor pain and request assistance   Assess pain using appropriate pain scale   Administer analgesics based on type and severity of pain and evaluate response   Implement non-pharmacological measures as appropriate and evaluate response     Problem: Safety - Adult  Goal: Free from fall injury  Flowsheets  Taken 3/18/2023 0026 by Ashlyn Hernandez RN  Free From Fall Injury:   Instruct family/caregiver on patient safety   Based on caregiver fall risk screen, instruct family/caregiver to ask for assistance with transferring infant if caregiver noted to have fall risk factors  Taken 3/17/2023 1236 by Amilcar Martínez RN  Free From Fall Injury: Instruct family/caregiver on patient safety     Problem: ABCDS Injury Assessment  Goal: Absence of physical injury  Flowsheets  Taken 3/18/2023 0026 by Ashlyn Hernandez RN  Absence of Physical Injury: Implement safety measures based on patient assessment  Taken 3/17/2023 1236 by Amilcar Martínez RN  Absence of Physical Injury: Implement safety measures based on patient assessment     Care plan reviewed with patient. Patient verbalizes understanding of the plan of care and contributes to goal setting.
Problem: Discharge Planning  Goal: Discharge to home or other facility with appropriate resources  Outcome: Progressing     Problem: Pain  Goal: Verbalizes/displays adequate comfort level or baseline comfort level  Outcome: Progressing
Problem: Discharge Planning  Goal: Discharge to home or other facility with appropriate resources  Outcome: Progressing     Problem: Pain  Goal: Verbalizes/displays adequate comfort level or baseline comfort level  Outcome: Progressing     Problem: Safety - Adult  Goal: Free from fall injury  Outcome: Progressing     Problem: Skin/Tissue Integrity  Goal: Absence of new skin breakdown  Description: 1. Monitor for areas of redness and/or skin breakdown  2. Assess vascular access sites hourly  3. Every 4-6 hours minimum:  Change oxygen saturation probe site  4. Every 4-6 hours:  If on nasal continuous positive airway pressure, respiratory therapy assess nares and determine need for appliance change or resting period. Outcome: Progressing     Problem: ABCDS Injury Assessment  Goal: Absence of physical injury  Outcome: Progressing   Care plan reviewed with patient. Patient verbalizes understanding of the plan of care and contribute to goal setting.
Problem: Discharge Planning  Goal: Discharge to home or other facility with appropriate resources  Outcome: Progressing  Flowsheets (Taken 3/17/2023 0309)  Discharge to home or other facility with appropriate resources:   Identify barriers to discharge with patient and caregiver   Arrange for needed discharge resources and transportation as appropriate   Identify discharge learning needs (meds, wound care, etc)     Problem: Pain  Goal: Verbalizes/displays adequate comfort level or baseline comfort level  Outcome: Progressing  Flowsheets (Taken 3/17/2023 0309)  Verbalizes/displays adequate comfort level or baseline comfort level:   Encourage patient to monitor pain and request assistance   Assess pain using appropriate pain scale   Administer analgesics based on type and severity of pain and evaluate response     Problem: Safety - Adult  Goal: Free from fall injury  Outcome: Progressing  Flowsheets (Taken 3/17/2023 0309)  Free From Fall Injury:   Instruct family/caregiver on patient safety   Based on caregiver fall risk screen, instruct family/caregiver to ask for assistance with transferring infant if caregiver noted to have fall risk factors     Problem: Skin/Tissue Integrity  Goal: Absence of new skin breakdown  Description: 1. Monitor for areas of redness and/or skin breakdown  2. Assess vascular access sites hourly  3. Every 4-6 hours minimum:  Change oxygen saturation probe site  4. Every 4-6 hours:  If on nasal continuous positive airway pressure, respiratory therapy assess nares and determine need for appliance change or resting period. Outcome: Progressing     Problem: ABCDS Injury Assessment  Goal: Absence of physical injury  Outcome: Progressing  Flowsheets (Taken 3/17/2023 0309)  Absence of Physical Injury: Implement safety measures based on patient assessment     Care plan reviewed with patient. Patient verbalizes understanding of the plan of care and contributes to goal setting.
Severely impaired: cannot locate objects without hearing or touching them or patient nonresponsive.

## 2023-03-20 NOTE — PROGRESS NOTES
Physician Progress Note      PATIENT:               Christina Bailey  CSN #:                  [id number]  :                       1950  ADMIT DATE:       3/14/2023 1:16 PM  Nazario Fields DATE:        3/18/2023 12:10 PM  RESPONDING  PROVIDER #:        Kyle Stewart PA-C          QUERY TEXT:    Patient admitted with back pain , noted to have atrial fibrillation and is   maintained on Eliquis. If possible, please document in progress notes and   discharge summary if you are evaluating and/or treating any of the following: The medical record reflects the following:  Risk Factors: Elderly  Clinical Indicators: [id number] 3, Pt with afib on Eliquis  Treatment: Medication management, lab monitoring    Thank You! Dann Sutherland RN, CRCR  RN Clinical Documentation Integrity  (B) [phone] (I) [phone]  Options provided:  -- Secondary hypercoagulable state in a patient with atrial fibrillation  -- Other - I will add my own diagnosis  -- Disagree - Not applicable / Not valid  -- Disagree - Clinically unable to determine / Unknown  -- Refer to Clinical Documentation Reviewer    PROVIDER RESPONSE TEXT:    This patient has secondary hypercoagulable state in a patient with atrial   fibrillation.     Query created by: Marissa Leyden on 3/15/2023 9:38 AM      Electronically signed by:  Kyle Stewart PA-C 3/20/2023 8:23 AM

## 2023-03-21 NOTE — DISCHARGE SUMMARY
spinal  canal at the T10, T11 and T12 level. Therefore, we did order an updated  thoracic MRI image, which did show evidence of a hematoma at the T11-T12  level. After further discussion, the patient did describe some symptoms  of myelopathic change as she did feel there was some balance and  coordination change. There was no evidence of acute compression  deformity, therefore kyphoplasty was not an option. At that point,  discussion was had between Dr. Baldomero Laurent and the patient that we would  monitor her symptoms conservatively. We will continue to hold her  Eliquis in possible preparation for surgical intervention if indicated. Our preference was to avoid any surgical indication for this hematoma as  she was not clearly symptomatic of any myelopathy, we did not believe  that this is causing her significant pain. The patient was able to work  with physical therapy over the course of three days and showed improved  pain control, safe mobility and by the date of 03/18/2023, she was doing  well and made her discharge home. She was sent home with _____ for  outpatient physical therapy. I did send her home with some Flexeril as  a discharge medication as well. DISCHARGE INSTRUCTIONS:  Include continued progressive mobility to her  tolerance with no strict restrictions from our standpoint. We will plan  to see her back as an outpatient moving forward. DISCHARGE DISPOSITION:  Home. DISCHARGE CONDITION:  Stable.           Sushil Fagan    D: 03/20/2023 12:42:36       T: 03/21/2023 6:26:55     [email or web address]  Job#: [id number]     Doc#: [id number]    CC:  Gaynel Lesches, Cnp

## 2023-04-01 LAB
EKG ATRIAL RATE: 81 BPM
EKG P AXIS: 23 DEGREES
EKG P-R INTERVAL: 170 MS
EKG Q-T INTERVAL: 376 MS
EKG Q-T INTERVAL: 406 MS
EKG QRS DURATION: 78 MS
EKG QRS DURATION: 80 MS
EKG QTC CALCULATION (BAZETT): 457 MS
EKG QTC CALCULATION (BAZETT): 471 MS
EKG R AXIS: 6 DEGREES
EKG R AXIS: 6 DEGREES
EKG T AXIS: 36 DEGREES
EKG T AXIS: 46 DEGREES
EKG VENTRICULAR RATE: 81 BPM
EKG VENTRICULAR RATE: 89 BPM

## 2023-07-05 ENCOUNTER — HOSPITAL ENCOUNTER (OUTPATIENT)
Dept: MAMMOGRAPHY | Age: 73
Discharge: HOME | End: 2023-07-05
Payer: MEDICARE

## 2023-07-05 DIAGNOSIS — Z12.31 VISIT FOR SCREENING MAMMOGRAM: ICD-10-CM

## 2023-07-05 PROCEDURE — 77063 BREAST TOMOSYNTHESIS BI: CPT

## 2023-10-06 ENCOUNTER — OFFICE VISIT (OUTPATIENT)
Dept: CARDIOLOGY CLINIC | Age: 73
End: 2023-10-06
Payer: MEDICARE

## 2023-10-06 VITALS
DIASTOLIC BLOOD PRESSURE: 80 MMHG | WEIGHT: 210.6 LBS | HEART RATE: 64 BPM | SYSTOLIC BLOOD PRESSURE: 110 MMHG | HEIGHT: 69 IN | BODY MASS INDEX: 31.19 KG/M2

## 2023-10-06 DIAGNOSIS — R07.9 CHEST PAIN, UNSPECIFIED TYPE: ICD-10-CM

## 2023-10-06 DIAGNOSIS — I48.0 PAF (PAROXYSMAL ATRIAL FIBRILLATION) (HCC): ICD-10-CM

## 2023-10-06 DIAGNOSIS — E78.01 FAMILIAL HYPERCHOLESTEROLEMIA: ICD-10-CM

## 2023-10-06 DIAGNOSIS — R06.02 SHORTNESS OF BREATH: ICD-10-CM

## 2023-10-06 DIAGNOSIS — M79.89 SWELLING OF LOWER EXTREMITY: Primary | ICD-10-CM

## 2023-10-06 DIAGNOSIS — I71.21 ANEURYSM OF ASCENDING AORTA WITHOUT RUPTURE (HCC): ICD-10-CM

## 2023-10-06 PROCEDURE — 1036F TOBACCO NON-USER: CPT | Performed by: REGISTERED NURSE

## 2023-10-06 PROCEDURE — 1123F ACP DISCUSS/DSCN MKR DOCD: CPT | Performed by: REGISTERED NURSE

## 2023-10-06 PROCEDURE — G8484 FLU IMMUNIZE NO ADMIN: HCPCS | Performed by: REGISTERED NURSE

## 2023-10-06 PROCEDURE — 99214 OFFICE O/P EST MOD 30 MIN: CPT | Performed by: REGISTERED NURSE

## 2023-10-06 PROCEDURE — G8399 PT W/DXA RESULTS DOCUMENT: HCPCS | Performed by: REGISTERED NURSE

## 2023-10-06 PROCEDURE — G8417 CALC BMI ABV UP PARAM F/U: HCPCS | Performed by: REGISTERED NURSE

## 2023-10-06 PROCEDURE — 1090F PRES/ABSN URINE INCON ASSESS: CPT | Performed by: REGISTERED NURSE

## 2023-10-06 PROCEDURE — 3017F COLORECTAL CA SCREEN DOC REV: CPT | Performed by: REGISTERED NURSE

## 2023-10-06 PROCEDURE — G8427 DOCREV CUR MEDS BY ELIG CLIN: HCPCS | Performed by: REGISTERED NURSE

## 2023-10-06 RX ORDER — FUROSEMIDE 20 MG/1
20 TABLET ORAL DAILY PRN
Qty: 15 TABLET | Refills: 3 | Status: SHIPPED | OUTPATIENT
Start: 2023-10-06

## 2023-10-06 RX ORDER — ROSUVASTATIN CALCIUM 40 MG/1
40 TABLET, COATED ORAL DAILY
Qty: 90 TABLET | Refills: 3 | Status: SHIPPED | OUTPATIENT
Start: 2023-10-06

## 2023-10-06 RX ORDER — CETIRIZINE HYDROCHLORIDE 10 MG/1
10 TABLET ORAL DAILY
COMMUNITY

## 2023-10-06 RX ORDER — NITROGLYCERIN 0.4 MG/1
0.4 TABLET SUBLINGUAL EVERY 5 MIN PRN
Qty: 25 TABLET | Refills: 3 | Status: SHIPPED | OUTPATIENT
Start: 2023-10-06

## 2023-10-06 RX ORDER — CALCIUM CARBONATE/VITAMIN D3 600 MG-10
1 TABLET ORAL DAILY
COMMUNITY

## 2023-10-06 RX ORDER — ACETAMINOPHEN 160 MG
2000 TABLET,DISINTEGRATING ORAL DAILY
COMMUNITY

## 2023-10-06 RX ORDER — POTASSIUM CHLORIDE 750 MG/1
10 TABLET, FILM COATED, EXTENDED RELEASE ORAL DAILY PRN
Qty: 15 TABLET | Refills: 3 | Status: SHIPPED | OUTPATIENT
Start: 2023-10-06

## 2023-10-06 NOTE — PROGRESS NOTES
The patient presents for a follow-up. She was referred here to review the carotid duplex. She states she has had balance issues and shortness of breath. She says when she gets short of breath, she'll get an \"achey\" feeling in her chest and jaw, which resolves itself quickly. She has intermittent palpitations and swelling of the lower extremities. The patient denies dizziness.
lasix/furosemide dose daily for next three days. Take one potassium supplement pill on the days you take the lasix. Call office to let me know how you responded to the medication next week. Continue other current medications as prescribed. Have your stress test and ECHO completed in the next 1-2 weeks. Have labs done with your PCP next week. I recommend BMP, Lipid panel, and LFTs from my perspective. Continue the following:  Daily weights  Monitor BP  Activity as tolerated   Limit Na to 2 gm daily  Wear compression stockings daily; keep legs elevated      Follow-up with your PCP as scheduled. Follow up with Neurology as scheduled to discuss MRI and Carotid US  Follow-up with Dr. Fredy Carroll, or Christopher Hinkle CNP  in 3 months  as scheduled or sooner if need. Disposition:  Return in about 3 months (around 1/6/2024), or if symptoms worsen or fail to improve, for f/u. Electronically signed by OLEG Cavazos CNP   10/6/2023 at 7:17 AM EDT       Diagnosis Orders   1. Swelling of lower extremity  furosemide (LASIX) 20 MG tablet    potassium chloride (KLOR-CON) 10 MEQ extended release tablet      2. Chest pain, unspecified type  nitroGLYCERIN (NITROSTAT) 0.4 MG SL tablet    Stress test, lexiscan    ECHO Complete 2D W Doppler W Color      3. Shortness of breath  Stress test, lexiscan    ECHO Complete 2D W Doppler W Color      4. Familial hypercholesterolemia  rosuvastatin (CRESTOR) 40 MG tablet      5. PAF (paroxysmal atrial fibrillation) ([address])        6.  Aneurysm of ascending aorta without rupture ([address])            Orders Placed This Encounter   Procedures    Stress test, lexiscan     Standing Status:   Future     Standing Expiration Date:   10/6/2024     Order Specific Question:   Reason for Exam?     Answer:   Chest pain     Order Specific Question:   Reason for Exam?     Answer:   Shortness of breath    ECHO Complete 2D W Doppler W Color     Standing Status:   Future     Standing Expiration

## 2023-10-06 NOTE — PATIENT INSTRUCTIONS
Your crestor has been increased. You may take 2 tablets of your 20 mg supply nightly until your new prescription arrives from [facility] (then you will take one pill)    Take the lasix/furosemide dose daily for next three days. Take one potassium supplement pill on the days you take the lasix. Call office to let me know how you responded to the medication next week. Continue other current medications as prescribed. Have your stress test and ECHO completed in the next 1-2 weeks. Have labs done with your PCP next week. I recommend BMP, Lipid panel, and LFTs from my perspective. Continue the following:  Daily weights  Monitor BP  Activity as tolerated   Limit Na to 2 gm daily  Wear compression stockings daily; keep legs elevated      Follow-up with your PCP as scheduled. Follow up with Neurology as scheduled to discuss MRI and Carotid US  Follow-up with Dr. Fredy Carroll, or Christopher Hinkle CNP  in 3 months  as scheduled or sooner if need.

## 2023-10-09 ENCOUNTER — TELEPHONE (OUTPATIENT)
Dept: CARDIOLOGY CLINIC | Age: 73
End: 2023-10-09

## 2023-10-09 NOTE — TELEPHONE ENCOUNTER
Pt called to give Lewis Iqbal an update   States after her appt on Friday   She took the water pill around 4:00Pm the next day her weight dropped 4lbs    On Saturday she weighed her self and she was up 1 lbs     Yesterday her weight was back down   Pt is wearing her compression stockings   Swelling seems to be doing better   Still has some SOB, but states she feels better then she did

## 2023-10-11 ENCOUNTER — HOSPITAL ENCOUNTER (OUTPATIENT)
Dept: ULTRASOUND IMAGING | Age: 73
Discharge: HOME | End: 2023-10-11
Attending: RADIOLOGY

## 2023-10-11 ENCOUNTER — TELEPHONE (OUTPATIENT)
Dept: CARDIOLOGY CLINIC | Age: 73
End: 2023-10-11

## 2023-10-11 ENCOUNTER — HOSPITAL ENCOUNTER (OUTPATIENT)
Dept: MRI IMAGING | Age: 73
Discharge: HOME | End: 2023-10-11
Attending: RADIOLOGY

## 2023-10-11 DIAGNOSIS — Z00.6 EXAMINATION FOR NORMAL COMPARISON FOR CLINICAL RESEARCH: ICD-10-CM

## 2023-10-12 ENCOUNTER — TELEPHONE (OUTPATIENT)
Dept: CARDIOLOGY CLINIC | Age: 73
End: 2023-10-12

## 2023-10-12 NOTE — TELEPHONE ENCOUNTER
Pt states her weight has been stable and her bp has been running around the 116/70 ranges. She has taken a total of 6 water pills so far. Echo/mt is scheduled for monday.

## 2023-10-13 NOTE — TELEPHONE ENCOUNTER
Pt states she still has a few water pills and is doing ok so far. Pt did not take a water pill today. She will see how testing comes out also.

## 2023-10-16 ENCOUNTER — HOSPITAL ENCOUNTER (OUTPATIENT)
Dept: NON INVASIVE DIAGNOSTICS | Age: 73
Discharge: HOME | End: 2023-10-16
Payer: MEDICARE

## 2023-10-16 DIAGNOSIS — R07.9 CHEST PAIN, UNSPECIFIED TYPE: ICD-10-CM

## 2023-10-16 DIAGNOSIS — R06.02 SHORTNESS OF BREATH: ICD-10-CM

## 2023-10-16 PROCEDURE — 78452 HT MUSCLE IMAGE SPECT MULT: CPT | Performed by: INTERNAL MEDICINE

## 2023-10-16 PROCEDURE — A9500 TC99M SESTAMIBI: HCPCS | Performed by: REGISTERED NURSE

## 2023-10-16 PROCEDURE — 93017 CV STRESS TEST TRACING ONLY: CPT | Performed by: INTERNAL MEDICINE

## 2023-10-16 PROCEDURE — 6360000002 HC RX W HCPCS

## 2023-10-16 PROCEDURE — 3430000000 HC RX DIAGNOSTIC RADIOPHARMACEUTICAL: Performed by: REGISTERED NURSE

## 2023-10-16 PROCEDURE — 93306 TTE W/DOPPLER COMPLETE: CPT

## 2023-10-16 RX ORDER — TETRAKIS(2-METHOXYISOBUTYLISOCYANIDE)COPPER(I) TETRAFLUOROBORATE 1 MG/ML
10.9 INJECTION, POWDER, LYOPHILIZED, FOR SOLUTION INTRAVENOUS
Status: COMPLETED | OUTPATIENT
Start: 2023-10-16 | End: 2023-10-16

## 2023-10-16 RX ORDER — TETRAKIS(2-METHOXYISOBUTYLISOCYANIDE)COPPER(I) TETRAFLUOROBORATE 1 MG/ML
35 INJECTION, POWDER, LYOPHILIZED, FOR SOLUTION INTRAVENOUS
Status: COMPLETED | OUTPATIENT
Start: 2023-10-16 | End: 2023-10-16

## 2023-10-16 RX ADMIN — Medication 35 MILLICURIE: at 13:46

## 2023-10-16 RX ADMIN — Medication 10.9 MILLICURIE: at 12:33

## 2023-10-17 DIAGNOSIS — M79.89 SWELLING OF LOWER EXTREMITY: ICD-10-CM

## 2023-10-17 DIAGNOSIS — R06.02 SHORTNESS OF BREATH: ICD-10-CM

## 2023-10-17 DIAGNOSIS — Z79.899 LONG TERM CURRENT USE OF DIURETIC: Primary | ICD-10-CM

## 2023-10-17 RX ORDER — FUROSEMIDE 20 MG/1
20 TABLET ORAL DAILY
Qty: 30 TABLET | Refills: 11 | Status: SHIPPED | OUTPATIENT
Start: 2023-10-17

## 2023-10-17 RX ORDER — POTASSIUM CHLORIDE 750 MG/1
10 TABLET, FILM COATED, EXTENDED RELEASE ORAL DAILY
Qty: 30 TABLET | Refills: 11 | Status: SHIPPED | OUTPATIENT
Start: 2023-10-17
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
## 2023-10-26 ENCOUNTER — HOSPITAL ENCOUNTER (OUTPATIENT)
Age: 73
Discharge: HOME | End: 2023-10-26
Payer: MEDICARE

## 2023-10-26 DIAGNOSIS — M79.89 SWELLING OF LOWER EXTREMITY: ICD-10-CM

## 2023-10-26 DIAGNOSIS — R06.02 SHORTNESS OF BREATH: ICD-10-CM

## 2023-10-26 DIAGNOSIS — Z79.899 LONG TERM CURRENT USE OF DIURETIC: ICD-10-CM

## 2023-10-26 LAB
ANION GAP SERPL CALC-SCNC: 11 MEQ/L (ref 8–16)
BUN SERPL-MCNC: 24 MG/DL (ref 7–22)
CALCIUM SERPL-MCNC: 9 MG/DL (ref 8.5–10.5)
CHLORIDE SERPL-SCNC: 106 MEQ/L (ref 98–111)
CO2 SERPL-SCNC: 27 MEQ/L (ref 23–33)
CREAT SERPL-MCNC: 1.1 MG/DL (ref 0.4–1.2)
GFR SERPL CREATININE-BSD FRML MDRD: 53 ML/MIN/1.73M2
GLUCOSE SERPL-MCNC: 88 MG/DL (ref 70–108)
POTASSIUM SERPL-SCNC: 3.4 MEQ/L (ref 3.5–5.2)
SODIUM SERPL-SCNC: 144 MEQ/L (ref 135–145)
TSH SERPL DL<=0.005 MIU/L-ACNC: 4.15 UIU/ML (ref 0.4–4.2)

## 2023-10-26 PROCEDURE — 80048 BASIC METABOLIC PNL TOTAL CA: CPT

## 2023-10-26 PROCEDURE — 84443 ASSAY THYROID STIM HORMONE: CPT

## 2023-10-26 PROCEDURE — 36415 COLL VENOUS BLD VENIPUNCTURE: CPT

## 2023-10-27 DIAGNOSIS — E87.8 POTASSIUM DISORDER: Primary | ICD-10-CM

## 2023-10-27 RX ORDER — ISOSORBIDE MONONITRATE 30 MG/1
30 TABLET, EXTENDED RELEASE ORAL DAILY
Qty: 30 TABLET | Refills: 2 | Status: SHIPPED | OUTPATIENT
Start: 2023-10-27

## 2023-10-27 RX ORDER — ISOSORBIDE MONONITRATE 30 MG/1
30 TABLET, EXTENDED RELEASE ORAL DAILY
COMMUNITY
End: 2023-10-27 | Stop reason: SDUPTHER

## 2023-10-27 RX ORDER — POTASSIUM CHLORIDE 20 MEQ/1
20 TABLET, EXTENDED RELEASE ORAL DAILY
Qty: 60 TABLET | Refills: 3 | Status: SHIPPED | OUTPATIENT
Start: 2023-10-27

## 2023-10-27 RX ORDER — POTASSIUM CHLORIDE 20 MEQ/1
20 TABLET, EXTENDED RELEASE ORAL DAILY
COMMUNITY
End: 2023-10-27 | Stop reason: SDUPTHER

## 2023-10-27 NOTE — TELEPHONE ENCOUNTER
----- Message from Tonda Lesch, APRN - CNP sent at 10/27/2023  6:54 AM EDT -----  Please let Ms. Gopi Elias know I reviewed her lab results. Her potassium is on the low end. Please have her take 2 of her potassium supplements (10meQ each) today and hold her lasix today. Resume lasix tomorrow and begin taking 2 potassium supplements da  tom with it. Obtain another BMP in 1-2 weeks. Regarding her intermittent jaw/chest pain. While her stress test was negative, I would like to start her on Imdur 30mg PO daily to see if she has relief. Please move her next appointment up to 4 weeks from now so we can discuss next steps. If she dev  elops chest pain at rest or chest pain unrelieved wit her nitroglycerin she should present to the ER for evaluation.

## 2023-11-03 ENCOUNTER — HOSPITAL ENCOUNTER (OUTPATIENT)
Age: 73
Discharge: HOME | End: 2023-11-03
Payer: MEDICARE

## 2023-11-03 DIAGNOSIS — E87.8 POTASSIUM DISORDER: ICD-10-CM

## 2023-11-03 LAB
ANION GAP SERPL CALC-SCNC: 11 MEQ/L (ref 8–16)
BUN SERPL-MCNC: 25 MG/DL (ref 7–22)
CALCIUM SERPL-MCNC: 9.3 MG/DL (ref 8.5–10.5)
CHLORIDE SERPL-SCNC: 104 MEQ/L (ref 98–111)
CO2 SERPL-SCNC: 26 MEQ/L (ref 23–33)
CREAT SERPL-MCNC: 1.2 MG/DL (ref 0.4–1.2)
GFR SERPL CREATININE-BSD FRML MDRD: 48 ML/MIN/1.73M2
GLUCOSE SERPL-MCNC: 93 MG/DL (ref 70–108)
POTASSIUM SERPL-SCNC: 4.3 MEQ/L (ref 3.5–5.2)
SODIUM SERPL-SCNC: 141 MEQ/L (ref 135–145)

## 2023-11-03 PROCEDURE — 36415 COLL VENOUS BLD VENIPUNCTURE: CPT

## 2023-11-03 PROCEDURE — 80048 BASIC METABOLIC PNL TOTAL CA: CPT

## 2023-11-28 ENCOUNTER — HOSPITAL ENCOUNTER (OUTPATIENT)
Age: 73
Discharge: HOME | End: 2023-11-28

## 2023-11-28 ENCOUNTER — OFFICE VISIT (OUTPATIENT)
Dept: NEUROLOGY | Age: 73
End: 2023-11-28
Payer: MEDICARE

## 2023-11-28 VITALS
WEIGHT: 212 LBS | HEART RATE: 49 BPM | DIASTOLIC BLOOD PRESSURE: 68 MMHG | OXYGEN SATURATION: 96 % | HEIGHT: 69 IN | SYSTOLIC BLOOD PRESSURE: 109 MMHG | BODY MASS INDEX: 31.4 KG/M2

## 2023-11-28 DIAGNOSIS — R42 DIZZINESS: ICD-10-CM

## 2023-11-28 DIAGNOSIS — R26.89 BALANCE PROBLEM: ICD-10-CM

## 2023-11-28 DIAGNOSIS — R41.0 EPISODE OF CONFUSION: ICD-10-CM

## 2023-11-28 DIAGNOSIS — R41.0 EPISODE OF CONFUSION: Primary | ICD-10-CM

## 2023-11-28 DIAGNOSIS — M89.9 DISORDER OF BONE, UNSPECIFIED: ICD-10-CM

## 2023-11-28 PROCEDURE — G8417 CALC BMI ABV UP PARAM F/U: HCPCS | Performed by: PSYCHIATRY & NEUROLOGY

## 2023-11-28 PROCEDURE — G8427 DOCREV CUR MEDS BY ELIG CLIN: HCPCS | Performed by: PSYCHIATRY & NEUROLOGY

## 2023-11-28 PROCEDURE — 1036F TOBACCO NON-USER: CPT | Performed by: PSYCHIATRY & NEUROLOGY

## 2023-11-28 PROCEDURE — 3017F COLORECTAL CA SCREEN DOC REV: CPT | Performed by: PSYCHIATRY & NEUROLOGY

## 2023-11-28 PROCEDURE — G8399 PT W/DXA RESULTS DOCUMENT: HCPCS | Performed by: PSYCHIATRY & NEUROLOGY

## 2023-11-28 PROCEDURE — 1123F ACP DISCUSS/DSCN MKR DOCD: CPT | Performed by: PSYCHIATRY & NEUROLOGY

## 2023-11-28 PROCEDURE — 99205 OFFICE O/P NEW HI 60 MIN: CPT | Performed by: PSYCHIATRY & NEUROLOGY

## 2023-11-28 PROCEDURE — 1090F PRES/ABSN URINE INCON ASSESS: CPT | Performed by: PSYCHIATRY & NEUROLOGY

## 2023-11-28 PROCEDURE — G8484 FLU IMMUNIZE NO ADMIN: HCPCS | Performed by: PSYCHIATRY & NEUROLOGY

## 2023-11-28 NOTE — PATIENT INSTRUCTIONS
CTA head and neck W/WO contrast  EEG  Tilt table test  Vitamin B12, folate  Vitamin B6 level  Vitamin D level  Vitamin E level  Call with any new symptoms or concerns. Follow up in 2 months.

## 2023-11-29 ENCOUNTER — HOSPITAL ENCOUNTER (OUTPATIENT)
Age: 73
Discharge: HOME | End: 2023-11-29
Payer: MEDICARE

## 2023-11-29 LAB
25(OH)D3 SERPL-MCNC: 56 NG/ML (ref 30–100)
BUN SERPL-MCNC: 28 MG/DL (ref 7–22)
CALCIUM SERPL-MCNC: 9.4 MG/DL (ref 8.5–10.5)
CREAT SERPL-MCNC: 1.3 MG/DL (ref 0.4–1.2)
FOLATE SERPL-MCNC: 9.5 NG/ML (ref 4.8–24.2)
GFR SERPL CREATININE-BSD FRML MDRD: 43 ML/MIN/1.73M2
PHOSPHATE SERPL-MCNC: 4 MG/DL (ref 2.4–4.7)
VIT B12 SERPL-MCNC: 516 PG/ML (ref 211–911)

## 2023-11-29 PROCEDURE — 84446 ASSAY OF VITAMIN E: CPT

## 2023-11-29 PROCEDURE — 82565 ASSAY OF CREATININE: CPT

## 2023-11-29 PROCEDURE — 82310 ASSAY OF CALCIUM: CPT

## 2023-11-29 PROCEDURE — 82746 ASSAY OF FOLIC ACID SERUM: CPT

## 2023-11-29 PROCEDURE — 84207 ASSAY OF VITAMIN B-6: CPT

## 2023-11-29 PROCEDURE — 82607 VITAMIN B-12: CPT

## 2023-11-29 PROCEDURE — 84520 ASSAY OF UREA NITROGEN: CPT

## 2023-11-29 PROCEDURE — 36415 COLL VENOUS BLD VENIPUNCTURE: CPT

## 2023-11-29 PROCEDURE — 82306 VITAMIN D 25 HYDROXY: CPT

## 2023-11-29 PROCEDURE — 84100 ASSAY OF PHOSPHORUS: CPT

## 2023-12-01 ENCOUNTER — OFFICE VISIT (OUTPATIENT)
Dept: CARDIOLOGY CLINIC | Age: 73
End: 2023-12-01
Payer: MEDICARE

## 2023-12-01 VITALS
DIASTOLIC BLOOD PRESSURE: 77 MMHG | BODY MASS INDEX: 31.25 KG/M2 | HEIGHT: 69 IN | HEART RATE: 88 BPM | WEIGHT: 211 LBS | SYSTOLIC BLOOD PRESSURE: 116 MMHG

## 2023-12-01 DIAGNOSIS — I50.32 CHRONIC HEART FAILURE WITH PRESERVED EJECTION FRACTION (HCC): Primary | ICD-10-CM

## 2023-12-01 DIAGNOSIS — I48.0 PAF (PAROXYSMAL ATRIAL FIBRILLATION) (HCC): ICD-10-CM

## 2023-12-01 DIAGNOSIS — M79.89 SWELLING OF LOWER EXTREMITY: ICD-10-CM

## 2023-12-01 LAB — PYRIDOXAL PHOS SERPL-SCNC: 32.3 NMOL/L (ref 20–125)

## 2023-12-01 PROCEDURE — 1090F PRES/ABSN URINE INCON ASSESS: CPT | Performed by: REGISTERED NURSE

## 2023-12-01 PROCEDURE — G8484 FLU IMMUNIZE NO ADMIN: HCPCS | Performed by: REGISTERED NURSE

## 2023-12-01 PROCEDURE — 1036F TOBACCO NON-USER: CPT | Performed by: REGISTERED NURSE

## 2023-12-01 PROCEDURE — 1123F ACP DISCUSS/DSCN MKR DOCD: CPT | Performed by: REGISTERED NURSE

## 2023-12-01 PROCEDURE — G8417 CALC BMI ABV UP PARAM F/U: HCPCS | Performed by: REGISTERED NURSE

## 2023-12-01 PROCEDURE — 3017F COLORECTAL CA SCREEN DOC REV: CPT | Performed by: REGISTERED NURSE

## 2023-12-01 PROCEDURE — G8399 PT W/DXA RESULTS DOCUMENT: HCPCS | Performed by: REGISTERED NURSE

## 2023-12-01 PROCEDURE — 99214 OFFICE O/P EST MOD 30 MIN: CPT | Performed by: REGISTERED NURSE

## 2023-12-01 PROCEDURE — G8427 DOCREV CUR MEDS BY ELIG CLIN: HCPCS | Performed by: REGISTERED NURSE

## 2023-12-01 RX ORDER — POTASSIUM CHLORIDE 20 MEQ/1
20 TABLET, EXTENDED RELEASE ORAL DAILY PRN
Qty: 60 TABLET | Refills: 3
Start: 2023-12-01

## 2023-12-01 RX ORDER — FUROSEMIDE 20 MG/1
20 TABLET ORAL DAILY PRN
Qty: 30 TABLET | Refills: 11
Start: 2023-12-01

## 2023-12-01 NOTE — PROGRESS NOTES
Pt here for 4 week check up     Pt concerned about b/p , brought b/p log for review , urine output on lasix     Pt continues off balance at times, swelling wears compression hose ,     Pt has upcoming testing
wall thickness was within normal limits. E/A flow reversal noted. Suggestive of diastolic dysfunction. Right Atrium   Right atrial size was normal.      Right Ventricle   The right ventricular size was normal with normal systolic function and   wall thickness. Pericardial Effusion   No evidence of any pericardial effusion.      M-Mode/2D Measurements & Calculations      LV Diastolic   LV Systolic Dimension:    AV Cusp Separation: 2.2 cmLA   Dimension: 4.9 3.1 cm                    Dimension: 3.9 cmAO Root   cm             LV Volume Diastolic: 694  Dimension: 3.3 cmLA Area: 20.8   LV FS:36.7 %   ml                        cm^2   LV PW          LV Volume Systolic: 86.3   Diastolic: 0.9 ml   cm             LV EDV/LV EDV Index: 113   Septum         ml/56 m^2LV ESV/LV ESV    RV Diastolic Dimension: 2.9 cm   Diastolic: 0.9 Index: 00.0 ml/19 m^2   cm             EF Calculated: 66.5 %     LA/Aorta: 1.18                                               LA volume/Index: 54 ml /27m^2                     LVOT: 2.1 cm     Doppler Measurements & Calculations      MV Peak E-Wave:     AV Peak Velocity: 164    LVOT Peak Velocity: 102 cm/s   78.5 cm/s           cm/s                     LVOT Mean Velocity: 62.9 cm/s   MV Peak A-Wave:     AV Peak Gradient: 10.76  LVOT Peak Gradient: 4   96.3 cm/s           mmHg                     mmHgLVOT Mean Gradient: 2   MV E/A Ratio: 0.82  AV Mean Velocity: 111    mmHg   MV Peak Gradient:   cm/s   2.46 mmHg           AV Mean Gradient: 6 mmHg TV Peak E-Wave: 51.8 cm/s                       AV VTI: 34.4 cm          TV Peak A-Wave: 38 cm/s   MV Deceleration     AV Area   Time: 264 msec      (Continuity):1.95 cm^2   TV Peak Gradient: 1.07 mmHg                                                TR Velocity:225 cm/s                       LVOT VTI: 19.4 cm        TR Gradient:20.25 mmHg   MV E' Septal        IVRT: 85 msec            PV Peak Velocity: 68.6 cm/s   Velocity: 6.63 cm/s

## 2023-12-01 NOTE — PATIENT INSTRUCTIONS
Reduce your lasix. Only take every other day; take the potassium supplement on the days you take your lasix. You may take a lasix on off days for increased swelling or weight gain >2lbs. Continue other current medications as prescribed. Continue the following:  Daily weights; call office if you gain >4 lbs in 1 week   Fluid restriction of 2 Liters per day  Limit sodium in diet to around 6850-7074 mg/day  Monitor BP  Activity as tolerated       Follow-up with your PCP as scheduled. Follow-up with Dr. Sina Armstrong    as scheduled or sooner if need.

## 2023-12-02 LAB — VITAMIN E LEVEL: NORMAL

## 2023-12-05 NOTE — PROGRESS NOTES
Chief Complaint   Patient presents with    Consultation     Balance problem, dizziness, and episode of altered awareness     Bonita Graf is a 68 y.o. female who presents today for evaluation of episode of confusion in September 2023. She was out at [employer] when she got to her car and noticed her key pad on her car to be blurry and she could not remember her code to unlock her car. This lasted a couple of minutes and then resolved, she was able to unlock her car and go about her day normally. No headache before, during, or after this spell. Of note, her blood pressure today is noted to be 109/68. She reports she has checked her blood pressure at home and its been as low as 70 systolic. She also has previous history of atrial fibrillation, she is on Eliquis. MRI brain WO contrast done 10/4/23 showed No acute abnormalities. Mild chronic small vessel ischemic changes. Carotid US done 10/3/23 showed A stenosis of less than 50% is present in the right internal carotid artery. There is 50% to 69% stenosis in the left internal carotid artery. The stenosis is at the lower end of this range. Mild atherosclerosis in the right and left carotid bifurcations. Her sleep is good, she wakes up feeling well rested. She  denies chest pain. No shortness of breath, no neck pain. No vision changes. No dysphagia. No fever. No rash. No weight loss. History provided by patient.        Past Medical History:   Diagnosis Date    Afib ([address])     Aortic aneurysm ([address])     Fatty liver     Hyperlipidemia     IBS (irritable bowel syndrome)     Senile osteoporosis        Patient Active Problem List   Diagnosis    Osteoporosis    Biliary colic    Narrow complex tachycardia    IBS (irritable bowel syndrome)    Hyperlipidemia    S/P laparoscopic cholecystectomy    Ascending aortic aneurysm, 4 cm per CT-chest from 7/25    Abnormal EKG - ST depression; cardio recommends outpatient cardiac cath    Postoperative ileus (HCC)    PAF

## 2023-12-15 ENCOUNTER — HOSPITAL ENCOUNTER (OUTPATIENT)
Age: 73
End: 2023-12-15
Payer: MEDICARE

## 2023-12-15 VITALS — WEIGHT: 205 LBS | BODY MASS INDEX: 30.36 KG/M2 | HEIGHT: 69 IN

## 2023-12-15 DIAGNOSIS — R26.89 BALANCE PROBLEM: ICD-10-CM

## 2023-12-15 DIAGNOSIS — R41.0 EPISODE OF CONFUSION: ICD-10-CM

## 2023-12-15 DIAGNOSIS — R42 DIZZINESS: ICD-10-CM

## 2023-12-15 LAB
ECHO BSA: 2.13 M2
TILT CV INITIAL SUPINE HEART RATE: 82 BPM
TILT CV INITIAL SUPINE MAX BP: NORMAL BPM
TILT CV INITIAL TILT BLOOD PRESSURE: NORMAL MMHG
TILT CV INITIAL TILT HEART RATE: 67 BPM
TILT CV MAX BP BLOOD PRESSURE: NORMAL MMHG
TILT CV MAX BP HEART RATE: 67 BPM
TILT CV MAX BP MINUTES: 1
TILT CV MAX BP SECONDS: 0
TILT CV MAX HEART RATE: 83 BPM
TILT CV MAX HR BLOOD PRESSURE: NORMAL MMHG
TILT CV MAX HR MINUTES: 28
TILT CV MAX HR SECONDS: 0
TILT CV MINIMUM BP BLOOD PRESSURE: NORMAL MMHG
TILT CV MINIMUM BP HEART RATE: 65 BPM
TILT CV MINIMUM BP MINUTES: 14
TILT CV MINIMUM BP SECONDS: 0
TILT CV MINIMUM HR BP: NORMAL MMHG
TILT CV MINIMUM HR HEART RATE: 56 BPM
TILT CV MINIMUM HR MINUTES: 4
TILT CV MINIMUM HR SECONDS: 0

## 2023-12-15 PROCEDURE — 93660 TILT TABLE EVALUATION: CPT

## 2023-12-15 PROCEDURE — 2580000003 HC RX 258: Performed by: NURSE PRACTITIONER

## 2023-12-15 RX ORDER — SODIUM CHLORIDE 9 MG/ML
INJECTION, SOLUTION INTRAVENOUS CONTINUOUS
Status: ACTIVE | OUTPATIENT
Start: 2023-12-15

## 2023-12-15 RX ADMIN — SODIUM CHLORIDE: 9 INJECTION, SOLUTION INTRAVENOUS at 08:38

## 2023-12-16 NOTE — PROCEDURES
[place] [address]                                TILT TABLE TEST    PATIENT NAME: Hyacinth German                      :        1950  MED REC NO:   [id number]                           ROOM:  ACCOUNT NO:   [de-identified]                           ADMIT DATE: 12/15/2023  PROVIDER:     Mahnaz Brandon. Roberto Hart M.D.    Jelani Fill:  12/15/2023    TILT TABLE TEST    INDICATIONS:  Dizziness, balance issue, and episode of confusion. Baseline blood pressure is 135/78, pulse rate 79. PROCEDURE DETAILS:  Under continuous EKG monitoring and intermittent  blood pressure monitoring, the patient was allowed to assume standing  position at 70-degree inclination. Three minutes into tilting, blood  pressure 150/79, pulse rate 57. Ten minutes into tilting, blood  pressure 136/73 and pulse rate 61. Twenty minutes into tilting, blood  pressure 123/71 and pulse rate 68. Twenty five minutes into tilting,  blood pressure 118/76 and pulse rate 74. Twenty nine minutes into  tilting, blood pressure 118/73, pulse rate 71, and 30 into tilting,  blood pressure 110/60, pulse rate 75. After 30 minutes into tilting, the patient was allowed to assume supine  position. First minute in the recovery in supine position, blood  pressure 140/78, pulse rate 63. Three minutes in the recovery in supine  position, blood pressure 148/86, pulse rate 61. SYMPTOMATOLOGY:  No symptoms throughout the tilting. HEMODYNAMICS:  Blood pressure at baseline 135/78 and progressively heart  rate maintained and unchanged. No significant change throughout the  tilting; however, the blood pressure 135/70s at baseline and in the 10  minutes remained in the 130s/70s and in the 20 minutes it becomes  120/75.   After 26 minutes, it becomes 116/75 and then went down even at  28 minutes 112/74 and in the 29 minutes becomes 118/73 and 30 minutes  becomes 110/60, and so basically,

## 2024-01-12 ENCOUNTER — TRANSCRIBE ORDERS (OUTPATIENT)
Dept: ADMINISTRATIVE | Age: 74
End: 2024-01-12

## 2024-01-12 DIAGNOSIS — R10.11 RIGHT UPPER QUADRANT PAIN: Primary | ICD-10-CM

## 2024-01-19 ENCOUNTER — HOSPITAL ENCOUNTER (OUTPATIENT)
Dept: CT IMAGING | Age: 74
Discharge: HOME | End: 2024-01-19
Payer: MEDICARE

## 2024-01-19 DIAGNOSIS — R10.11 RIGHT UPPER QUADRANT PAIN: ICD-10-CM

## 2024-01-19 LAB
ALBUMIN SERPL BCG-MCNC: 4.1 G/DL (ref 3.5–5.1)
ALP SERPL-CCNC: 73 U/L (ref 38–126)
ALT SERPL W/O P-5'-P-CCNC: 29 U/L (ref 11–66)
AST SERPL-CCNC: 38 U/L (ref 5–40)
BILIRUB CONJ SERPL-MCNC: < 0.2 MG/DL (ref 0–0.3)
BILIRUB SERPL-MCNC: 0.9 MG/DL (ref 0.3–1.2)
CREAT BLD-MCNC: 1.4 MG/DL (ref 0.5–1.2)
GFR SERPL CREATININE-BSD FRML MDRD: 40 ML/MIN/1.73M2
PROT SERPL-MCNC: 6.7 G/DL (ref 6.1–8)

## 2024-01-19 PROCEDURE — 6360000004 HC RX CONTRAST MEDICATION

## 2024-01-19 PROCEDURE — 74177 CT ABD & PELVIS W/CONTRAST: CPT

## 2024-01-19 PROCEDURE — 82565 ASSAY OF CREATININE: CPT

## 2024-01-19 PROCEDURE — 80076 HEPATIC FUNCTION PANEL: CPT

## 2024-01-19 PROCEDURE — 36415 COLL VENOUS BLD VENIPUNCTURE: CPT

## 2024-01-19 RX ADMIN — IOPAMIDOL 85 ML: 755 INJECTION, SOLUTION INTRAVENOUS at 09:53

## 2024-02-05 ENCOUNTER — OFFICE VISIT (OUTPATIENT)
Dept: NEUROLOGY | Age: 74
End: 2024-02-05
Payer: MEDICARE

## 2024-02-05 VITALS
SYSTOLIC BLOOD PRESSURE: 118 MMHG | BODY MASS INDEX: 31.7 KG/M2 | HEIGHT: 69 IN | WEIGHT: 214 LBS | HEART RATE: 50 BPM | OXYGEN SATURATION: 98 % | DIASTOLIC BLOOD PRESSURE: 78 MMHG

## 2024-02-05 DIAGNOSIS — R41.0 EPISODE OF CONFUSION: Primary | ICD-10-CM

## 2024-02-05 DIAGNOSIS — R42 DIZZINESS: ICD-10-CM

## 2024-02-05 DIAGNOSIS — R26.89 BALANCE PROBLEM: ICD-10-CM

## 2024-02-05 DIAGNOSIS — I95.1 ORTHOSTATIC HYPOTENSION: ICD-10-CM

## 2024-02-05 PROCEDURE — 1123F ACP DISCUSS/DSCN MKR DOCD: CPT | Performed by: NURSE PRACTITIONER

## 2024-02-05 PROCEDURE — G8427 DOCREV CUR MEDS BY ELIG CLIN: HCPCS | Performed by: NURSE PRACTITIONER

## 2024-02-05 PROCEDURE — G8484 FLU IMMUNIZE NO ADMIN: HCPCS | Performed by: NURSE PRACTITIONER

## 2024-02-05 PROCEDURE — 1036F TOBACCO NON-USER: CPT | Performed by: NURSE PRACTITIONER

## 2024-02-05 PROCEDURE — G8399 PT W/DXA RESULTS DOCUMENT: HCPCS | Performed by: NURSE PRACTITIONER

## 2024-02-05 PROCEDURE — 3017F COLORECTAL CA SCREEN DOC REV: CPT | Performed by: NURSE PRACTITIONER

## 2024-02-05 PROCEDURE — G8417 CALC BMI ABV UP PARAM F/U: HCPCS | Performed by: NURSE PRACTITIONER

## 2024-02-05 PROCEDURE — 1090F PRES/ABSN URINE INCON ASSESS: CPT | Performed by: NURSE PRACTITIONER

## 2024-02-05 PROCEDURE — 99213 OFFICE O/P EST LOW 20 MIN: CPT | Performed by: NURSE PRACTITIONER

## 2024-02-05 NOTE — PATIENT INSTRUCTIONS
Advise patient to wear support stocking.  Avoid standing for prolonged periods of time.  Take plenty of fluids with electrolytes.   Follow up as needed.  Call if any questions or concerns.

## 2024-02-05 NOTE — PROGRESS NOTES
NEUROLOGY OUT PATIENT FOLLOW UP NOTE:  2/5/20248:50 AM    Joshua Christy is here for follow up for episode of confusion, dizziness, balance trouble.              Allergies   Allergen Reactions    Amitiza [Lubiprostone] Diarrhea    Bentyl [Dicyclomine Hcl]      Severe spasms    Isosorbide Headaches     Pt states when taken headaches , chills , shakey     Lipitor [Atorvastatin]      Muscle aches    Tramadol Other (See Comments)     Constipation    Soma [Carisoprodol] Nausea And Vomiting       Current Outpatient Medications:     apixaban (ELIQUIS) 5 MG TABS tablet, TAKE 1 TABLET BY MOUTH TWICE  DAILY, Disp: 180 tablet, Rfl: 0    potassium chloride (KLOR-CON M20) 20 MEQ extended release tablet, Take 1 tablet by mouth daily as needed (Take dose on days you take your lasix dose), Disp: 60 tablet, Rfl: 3    furosemide (LASIX) 20 MG tablet, Take 1 tablet by mouth daily as needed (take every other day; may take extra dose on off days for increased swelling or weight gain >2 lbs), Disp: 30 tablet, Rfl: 11    Cholecalciferol (VITAMIN D3) 50 MCG (2000 UT) CAPS, Take 1 capsule by mouth daily, Disp: , Rfl:     calcium carb-cholecalciferol (CALCIUM + VITAMIN D3) 600-10 MG-MCG TABS per tab, Take 1 tablet by mouth daily, Disp: , Rfl:     Chlorphen-Pseudoephed-APAP (CORICIDIN D PO), Take by mouth daily as needed, Disp: , Rfl:     cetirizine (ZYRTEC) 10 MG tablet, Take 1 tablet by mouth daily, Disp: , Rfl:     Biotin w/ Vitamins C & E (HAIR SKIN & NAILS GUMMIES PO), Take 1 tablet by mouth daily, Disp: , Rfl:     nitroGLYCERIN (NITROSTAT) 0.4 MG SL tablet, Place 1 tablet under the tongue every 5 minutes as needed for Chest pain, Disp: 25 tablet, Rfl: 3    rosuvastatin (CRESTOR) 40 MG tablet, Take 1 tablet by mouth daily, Disp: 90 tablet, Rfl: 3    cyclobenzaprine (FLEXERIL) 10 MG tablet, Take 1 tablet by mouth 3 times daily as needed for Muscle spasms, Disp: 50 tablet, Rfl: 0    levothyroxine (SYNTHROID) 25 MCG tablet, Take 1 tablet by

## 2024-03-07 ENCOUNTER — OFFICE VISIT (OUTPATIENT)
Dept: CARDIOLOGY CLINIC | Age: 74
End: 2024-03-07

## 2024-03-07 VITALS
DIASTOLIC BLOOD PRESSURE: 82 MMHG | WEIGHT: 215.4 LBS | SYSTOLIC BLOOD PRESSURE: 132 MMHG | HEIGHT: 69 IN | HEART RATE: 76 BPM | BODY MASS INDEX: 31.9 KG/M2

## 2024-03-07 DIAGNOSIS — M79.89 SWELLING OF LOWER EXTREMITY: Primary | ICD-10-CM

## 2024-03-07 DIAGNOSIS — I48.0 PAF (PAROXYSMAL ATRIAL FIBRILLATION) (HCC): ICD-10-CM

## 2024-03-07 NOTE — PROGRESS NOTES
intact distal pulses.    No murmur heard.  Pulmonary/Chest: Effort normal and breath sounds normal. No respiratory distress. No wheezes. No rales.   Abdominal: Soft. Bowel sounds are normal. No distension. There is no tenderness.   Musculoskeletal: Normal range of motion. 2-3+ edema.   Neurological: Alert and oriented to person, place, and time. No cranial nerve deficit. Coordination normal.   Skin: Skin is warm and dry.   Psychiatric: Normal mood and affect.       No results found for: \"CKTOTAL\", \"CKMB\", \"CKMBINDEX\"    Lab Results   Component Value Date/Time    WBC 4.7 03/16/2023 06:41 AM    RBC 3.71 03/16/2023 06:41 AM    RBC 4.19 08/12/2022 12:00 PM    HGB 12.4 03/16/2023 06:41 AM    HCT 38.1 03/16/2023 06:41 AM    .7 03/16/2023 06:41 AM    MCH 33.4 03/16/2023 06:41 AM    MCHC 32.5 03/16/2023 06:41 AM    RDW 13.6 08/12/2022 12:00 PM     03/16/2023 06:41 AM    MPV 10.0 03/16/2023 06:41 AM       Lab Results   Component Value Date/Time     11/03/2023 11:32 AM    K 4.3 11/03/2023 11:32 AM    K 4.2 03/17/2023 10:28 AM     11/03/2023 11:32 AM    CO2 26 11/03/2023 11:32 AM    BUN 28 11/29/2023 02:07 PM    LABALBU 4.1 01/19/2024 08:59 AM    CREATININE 1.4 01/19/2024 08:59 AM    CALCIUM 9.4 11/29/2023 02:07 PM    LABGLOM 40 01/19/2024 08:59 AM    GLUCOSE 93 11/03/2023 11:32 AM    GLUCOSE 94 04/10/2023 08:36 AM       Lab Results   Component Value Date/Time    ALKPHOS 73 01/19/2024 08:59 AM    ALT 29 01/19/2024 08:59 AM    AST 38 01/19/2024 08:59 AM    PROT 6.7 01/19/2024 08:59 AM    BILITOT 0.9 01/19/2024 08:59 AM    BILIDIR <0.2 01/19/2024 08:59 AM    IBILI 0.8 04/10/2023 08:36 AM    LABALBU 4.1 01/19/2024 08:59 AM       Lab Results   Component Value Date/Time    MG 1.9 03/11/2023 10:33 PM       Lab Results   Component Value Date    INR 1.31 (H) 03/15/2023    INR 1.00 08/12/2015         No results found for: \"LABA1C\"    Lab Results   Component Value Date/Time    TRIG 42 04/10/2023 08:36 AM    HDL

## 2024-03-07 NOTE — PATIENT INSTRUCTIONS
Your Nurses today were: HARRY Daigle and HARRY Giron  Your Doctor today: Dr Bautista   Phone Number: [phone]

## 2024-07-09 ENCOUNTER — HOSPITAL ENCOUNTER (OUTPATIENT)
Dept: MAMMOGRAPHY | Age: 74
Discharge: HOME | End: 2024-07-09
Payer: MEDICARE

## 2024-07-09 VITALS — BODY MASS INDEX: 29.62 KG/M2 | HEIGHT: 69 IN | WEIGHT: 200 LBS

## 2024-07-09 DIAGNOSIS — Z12.39 BREAST SCREENING: ICD-10-CM

## 2024-07-09 PROCEDURE — 77063 BREAST TOMOSYNTHESIS BI: CPT

## 2024-07-12 ENCOUNTER — HOSPITAL ENCOUNTER (OUTPATIENT)
Dept: WOMENS IMAGING | Age: 74
Discharge: HOME | End: 2024-07-12
Attending: RADIOLOGY
Payer: MEDICARE

## 2024-07-12 ENCOUNTER — TELEPHONE (OUTPATIENT)
Dept: CARDIOLOGY CLINIC | Age: 74
End: 2024-07-12

## 2024-07-12 DIAGNOSIS — R92.0 MICROCALCIFICATION OF RIGHT BREAST ON MAMMOGRAM: ICD-10-CM

## 2024-07-12 PROCEDURE — G0279 TOMOSYNTHESIS, MAMMO: HCPCS

## 2024-07-18 ENCOUNTER — HOSPITAL ENCOUNTER (OUTPATIENT)
Dept: WOMENS IMAGING | Age: 74
Discharge: HOME | End: 2024-07-18
Payer: MEDICARE

## 2024-07-18 DIAGNOSIS — R92.1 BREAST CALCIFICATIONS: ICD-10-CM

## 2024-07-18 DIAGNOSIS — R92.8 ABNORMAL MAMMOGRAM: ICD-10-CM

## 2024-07-18 PROCEDURE — 77065 DX MAMMO INCL CAD UNI: CPT

## 2024-07-18 PROCEDURE — 88305 TISSUE EXAM BY PATHOLOGIST: CPT

## 2024-07-18 PROCEDURE — 19081 BX BREAST 1ST LESION STRTCTC: CPT

## 2024-07-18 NOTE — PROGRESS NOTES
[facility]  Pre-Biopsy Assessment      Patient Education    Written information about procedure Yes  right   Procedural steps explained Yes Stereotactic Biopsy   Post-op potential: bruising, hematoma, pain Yes    Self-care: activity, care of dressing Yes    Patient verbalized understanding Yes    Consent signed and witnessed Yes      Hormone Therapy Status: none    Recent Medication: Aspirin and Eliquis  Last Dose: still taking the 81 mg aspirin but has been off Eliquis since Sunday                                     Hormone Replacement Therapy: no    Previous Breast Biopsy: yes - 2006 right stereo - benign    Previous Diagnosis Cancer: no    Hysterectomy:yes - complete age 34 for endometriosis and heavy bleeding    Emotional Status: Calm    Language or Physical Barriers: none    Comments: none      Electronically signed by Poly Childers on 7/18/2024 at 10:45 AM

## 2024-07-18 NOTE — PROGRESS NOTES
Breast Biopsy Flowsheet/Post-Operative Care    Date of Procedure: 7/18/2024  Physician: Dr. Karimi  Technologist: Lacie Trujillo RT(R)(M)    Biopsy:stereotactic breast biopsy  Lesion type: Non-palpable  Breast: right    Clock face position: Site #1: outer central        Primary Method of Detection: Mammogram      Microcalcification's: yes,Increasing Number   Distribution: Grouped     Biopsy Method:   Mammotome:    Site # 1    Gauge: 10    # of Passes: 8     Clip: Yashira         Pre-Op Assessment: (BI-RADS)   4. Suspicious Abnormality    Patient Tolerated Procedure: good  Complications: none  Comments: none    Post Operative Care  Steri strips: Yes  Dressing: Gauze, Tape   Ice Applied to Site:  Yes  Evidence of Bleeding:  No    Pain Verbalized: No      Written Discharge Instructions: Yes  Condition at Discharge: good  Time of Discharge: 1040    Electronically signed by Poly Childers on 7/18/2024 at 10:48 AM

## 2024-07-19 ENCOUNTER — CLINICAL DOCUMENTATION (OUTPATIENT)
Dept: WOMENS IMAGING | Age: 74
End: 2024-07-19

## 2024-07-19 NOTE — PROGRESS NOTES
Contact Type :    Telephone  Notes :  After consulting with Dr. Karimi,  Joshua was contacted by telephone.  She was informed of the negative biopsy results and the need to return for a 6 month follow up.  She voiced understanding.    Biopsy site: really good     Results faxed to: BEVERLY Barrera

## 2024-07-22 ENCOUNTER — CLINICAL DOCUMENTATION (OUTPATIENT)
Dept: WOMENS IMAGING | Age: 74
End: 2024-07-22

## 2024-07-22 NOTE — PROGRESS NOTES
Joshua called today with concerns about bruising and a breast lump.  She came in for us to evaluate it.  She has a bruise covering the outer and  anterior portion of her breast.  Also has a 5 cm lump at biopsy site.  No redness, warmth, fever or discharge at biopsy site. Instructed to continue her Eliquis and we will contact her tomorrow.  She voiced understanding.

## 2024-07-23 ENCOUNTER — CLINICAL DOCUMENTATION (OUTPATIENT)
Dept: WOMENS IMAGING | Age: 74
End: 2024-07-23

## 2024-07-23 NOTE — PROGRESS NOTES
Spoke to Joshua she states the area of bruising is not increasing in size, but is very purple.  Area of the hematoma does not feel like it is \"pulling\" like it was yesterday.  She has been using heat and feels that is helping.  Instructed to call if painful, increased swelling, or fever. She voiced understanding.

## 2024-07-24 DIAGNOSIS — R92.0 MICROCALCIFICATION OF RIGHT BREAST ON MAMMOGRAM: ICD-10-CM

## 2024-07-24 DIAGNOSIS — Z09 FOLLOW-UP EXAM: Primary | ICD-10-CM

## 2025-01-09 ENCOUNTER — TELEPHONE (OUTPATIENT)
Dept: CARDIOLOGY CLINIC | Age: 75
End: 2025-01-09

## 2025-01-09 DIAGNOSIS — I71.21 ANEURYSM OF ASCENDING AORTA WITHOUT RUPTURE (HCC): Primary | ICD-10-CM

## 2025-01-09 NOTE — TELEPHONE ENCOUNTER
When speaking with patient to reschedule appt   patient is requesting a CTA for aortic aneurysm before appt  in march with Javed, since did not have one last year.    Dr. Bautista advise?

## 2025-01-13 NOTE — TELEPHONE ENCOUNTER
Creatine levels has been elevated, advise on this?     Patient is scheduled for cta per central scheduling.

## 2025-01-17 ENCOUNTER — HOSPITAL ENCOUNTER (OUTPATIENT)
Dept: CT IMAGING | Age: 75
Discharge: HOME | End: 2025-01-17
Attending: INTERNAL MEDICINE
Payer: MEDICARE

## 2025-01-17 DIAGNOSIS — I71.21 ANEURYSM OF ASCENDING AORTA WITHOUT RUPTURE (HCC): ICD-10-CM

## 2025-01-17 LAB
CREAT BLD-MCNC: 1.3 MG/DL (ref 0.5–1.2)
GFR SERPL CREATININE-BSD FRML MDRD: 43 ML/MIN/1.73M2

## 2025-01-17 PROCEDURE — 71275 CT ANGIOGRAPHY CHEST: CPT

## 2025-01-17 PROCEDURE — 6360000004 HC RX CONTRAST MEDICATION: Performed by: INTERNAL MEDICINE

## 2025-01-17 PROCEDURE — 82565 ASSAY OF CREATININE: CPT

## 2025-01-17 RX ORDER — IOPAMIDOL 755 MG/ML
95 INJECTION, SOLUTION INTRAVASCULAR
Status: COMPLETED | OUTPATIENT
Start: 2025-01-17 | End: 2025-01-17

## 2025-01-17 RX ADMIN — IOPAMIDOL 95 ML: 755 INJECTION, SOLUTION INTRAVENOUS at 09:12

## 2025-01-17 NOTE — TELEPHONE ENCOUNTER
Please see CTA.  Anything needed?    IMPRESSION:     1. Stable CTA chest, no interval change since previous study dated 3/11/2023.  2. Dilated ascending aorta which measures 4.4 cm in diameter. The descending  thoracic aorta is unremarkable.  3. Mild dilatation of the main, right and left pulmonary arteries.  4. Mild cardiomegaly.  5. Diffuse COPD, thickening of the interstitial lung markings and evidence for  granulomatous disease in the right lower lobe and right hilum.  6. Thoracic spondylosis. Status post instrumentation in the mid thoracic  vertebral bodies. Compression deformity at the thoracolumbar junction.  7. Slight heterogeneity involving the right and left lobes of the thyroid  gland..

## 2025-01-17 NOTE — TELEPHONE ENCOUNTER
Pt scheduled today at 9.  No previous nephrology encounters, doesn't appear pt sees nephrology.  There will be a stat creatinine drawn in CT today.  Prev Creat 1.13-1.4  Called CT they won't do procedure if Creat above 2.   1.8-1.9 they will call the physician. .  1.5 or above they will have the patient repeat Creat in 2 days.    Call to Dr Bautista. No answer at this time.

## 2025-02-03 ENCOUNTER — HOSPITAL ENCOUNTER (OUTPATIENT)
Dept: WOMENS IMAGING | Age: 75
Discharge: HOME | End: 2025-02-03
Attending: RADIOLOGY
Payer: MEDICARE

## 2025-02-03 ENCOUNTER — HOSPITAL ENCOUNTER (OUTPATIENT)
Dept: WOMENS IMAGING | Age: 75
End: 2025-02-03
Attending: RADIOLOGY
Payer: MEDICARE

## 2025-02-03 DIAGNOSIS — Z09 FOLLOW-UP EXAM: ICD-10-CM

## 2025-02-03 DIAGNOSIS — R92.0 MICROCALCIFICATION OF RIGHT BREAST ON MAMMOGRAM: ICD-10-CM

## 2025-02-03 PROCEDURE — G0279 TOMOSYNTHESIS, MAMMO: HCPCS

## 2025-02-25 ENCOUNTER — OFFICE VISIT (OUTPATIENT)
Dept: PULMONOLOGY | Age: 75
End: 2025-02-25
Payer: MEDICARE

## 2025-02-25 VITALS
HEIGHT: 70 IN | DIASTOLIC BLOOD PRESSURE: 76 MMHG | BODY MASS INDEX: 29.55 KG/M2 | WEIGHT: 206.4 LBS | TEMPERATURE: 97.4 F | OXYGEN SATURATION: 96 % | HEART RATE: 56 BPM | SYSTOLIC BLOOD PRESSURE: 112 MMHG

## 2025-02-25 DIAGNOSIS — R93.89 ABNORMAL CT OF THE CHEST: Primary | ICD-10-CM

## 2025-02-25 DIAGNOSIS — I27.20 PULMONARY HYPERTENSION (HCC): ICD-10-CM

## 2025-02-25 DIAGNOSIS — R06.02 SHORTNESS OF BREATH: ICD-10-CM

## 2025-02-25 DIAGNOSIS — M79.89 SWELLING OF BOTH LOWER EXTREMITIES: ICD-10-CM

## 2025-02-25 PROBLEM — K57.90 DIVERTICULOSIS: Status: ACTIVE | Noted: 2022-06-06

## 2025-02-25 PROBLEM — K91.89 POSTOPERATIVE ILEUS (HCC): Status: RESOLVED | Noted: 2017-07-27 | Resolved: 2025-02-25

## 2025-02-25 PROBLEM — I65.23 BILATERAL CAROTID ARTERY STENOSIS: Status: ACTIVE | Noted: 2024-04-30

## 2025-02-25 PROBLEM — Z90.49 S/P LAPAROSCOPIC CHOLECYSTECTOMY: Status: RESOLVED | Noted: 2017-07-25 | Resolved: 2025-02-25

## 2025-02-25 PROBLEM — R94.31 ABNORMAL EKG: Status: RESOLVED | Noted: 2017-07-26 | Resolved: 2025-02-25

## 2025-02-25 PROBLEM — E78.01 FAMILIAL HYPERCHOLESTEROLEMIA: Status: ACTIVE | Noted: 2024-04-30

## 2025-02-25 PROBLEM — S32.040G CLOSED COMPRESSION FRACTURE OF L4 LUMBAR VERTEBRA WITH DELAYED HEALING, SUBSEQUENT ENCOUNTER: Status: RESOLVED | Noted: 2023-03-14 | Resolved: 2025-02-25

## 2025-02-25 PROBLEM — K56.7 POSTOPERATIVE ILEUS (HCC): Status: RESOLVED | Noted: 2017-07-27 | Resolved: 2025-02-25

## 2025-02-25 PROBLEM — I47.19 NARROW COMPLEX TACHYCARDIA: Status: RESOLVED | Noted: 2017-07-25 | Resolved: 2025-02-25

## 2025-02-25 PROCEDURE — 1160F RVW MEDS BY RX/DR IN RCRD: CPT | Performed by: INTERNAL MEDICINE

## 2025-02-25 PROCEDURE — 99204 OFFICE O/P NEW MOD 45 MIN: CPT | Performed by: INTERNAL MEDICINE

## 2025-02-25 PROCEDURE — 1159F MED LIST DOCD IN RCRD: CPT | Performed by: INTERNAL MEDICINE

## 2025-02-25 PROCEDURE — G8427 DOCREV CUR MEDS BY ELIG CLIN: HCPCS | Performed by: INTERNAL MEDICINE

## 2025-02-25 PROCEDURE — 1090F PRES/ABSN URINE INCON ASSESS: CPT | Performed by: INTERNAL MEDICINE

## 2025-02-25 PROCEDURE — 3017F COLORECTAL CA SCREEN DOC REV: CPT | Performed by: INTERNAL MEDICINE

## 2025-02-25 PROCEDURE — 1123F ACP DISCUSS/DSCN MKR DOCD: CPT | Performed by: INTERNAL MEDICINE

## 2025-02-25 PROCEDURE — G8399 PT W/DXA RESULTS DOCUMENT: HCPCS | Performed by: INTERNAL MEDICINE

## 2025-02-25 PROCEDURE — 1036F TOBACCO NON-USER: CPT | Performed by: INTERNAL MEDICINE

## 2025-02-25 PROCEDURE — G8417 CALC BMI ABV UP PARAM F/U: HCPCS | Performed by: INTERNAL MEDICINE

## 2025-02-25 ASSESSMENT — ENCOUNTER SYMPTOMS
WHEEZING: 0
SPUTUM PRODUCTION: 0
BLOOD IN STOOL: 0
COUGH: 1
SHORTNESS OF BREATH: 1
HEARTBURN: 0
TROUBLE SWALLOWING: 0
HEMOPTYSIS: 0

## 2025-03-05 ENCOUNTER — TRANSCRIBE ORDERS (OUTPATIENT)
Dept: ADMINISTRATIVE | Age: 75
End: 2025-03-05

## 2025-03-05 DIAGNOSIS — R93.89 ABNORMAL IMAGING OF THYROID: Primary | ICD-10-CM

## 2025-03-06 ENCOUNTER — TELEPHONE (OUTPATIENT)
Dept: CARDIOLOGY CLINIC | Age: 75
End: 2025-03-06

## 2025-03-06 DIAGNOSIS — R06.02 SHORTNESS OF BREATH: ICD-10-CM

## 2025-03-06 DIAGNOSIS — I50.32 CHRONIC HEART FAILURE WITH PRESERVED EJECTION FRACTION (HCC): Primary | ICD-10-CM

## 2025-03-06 NOTE — TELEPHONE ENCOUNTER
Pt calling, saw Dr Mcintyre as new patient.    PT states he was concerned about the following finding on 10/2023 echo:     IVC size is severely dilated with reduced respirophasic variation (CVP~>20   mmHg).    He ordered a repeat echo and PFTs which are scheduled for 3/19.    Pt is worried after doing some of her own research. She reports some \"achiness\" in the center of chest and SOB, especially when she first gets up in the morning.     She is asking for Dr Bautista's thoughts on this.       Okay to leave detailed message per patient.

## 2025-03-06 NOTE — TELEPHONE ENCOUNTER
She was orthostatic (dehydrated) and lasix was made prn, then echo shows too much volume  F/u CHF clinic and if needed could consider RHC

## 2025-03-11 ENCOUNTER — OFFICE VISIT (OUTPATIENT)
Dept: CARDIOLOGY CLINIC | Age: 75
End: 2025-03-11
Payer: MEDICARE

## 2025-03-11 VITALS
SYSTOLIC BLOOD PRESSURE: 112 MMHG | WEIGHT: 203 LBS | BODY MASS INDEX: 29.06 KG/M2 | HEART RATE: 61 BPM | DIASTOLIC BLOOD PRESSURE: 60 MMHG | HEIGHT: 70 IN

## 2025-03-11 DIAGNOSIS — I48.0 PAF (PAROXYSMAL ATRIAL FIBRILLATION) (HCC): ICD-10-CM

## 2025-03-11 DIAGNOSIS — I50.32 CHRONIC HEART FAILURE WITH PRESERVED EJECTION FRACTION (HCC): ICD-10-CM

## 2025-03-11 DIAGNOSIS — R06.02 SHORTNESS OF BREATH: Primary | ICD-10-CM

## 2025-03-11 PROCEDURE — G8399 PT W/DXA RESULTS DOCUMENT: HCPCS | Performed by: PHYSICIAN ASSISTANT

## 2025-03-11 PROCEDURE — 99214 OFFICE O/P EST MOD 30 MIN: CPT | Performed by: PHYSICIAN ASSISTANT

## 2025-03-11 PROCEDURE — 1159F MED LIST DOCD IN RCRD: CPT | Performed by: PHYSICIAN ASSISTANT

## 2025-03-11 PROCEDURE — 1123F ACP DISCUSS/DSCN MKR DOCD: CPT | Performed by: PHYSICIAN ASSISTANT

## 2025-03-11 PROCEDURE — 93000 ELECTROCARDIOGRAM COMPLETE: CPT | Performed by: PHYSICIAN ASSISTANT

## 2025-03-11 PROCEDURE — G8417 CALC BMI ABV UP PARAM F/U: HCPCS | Performed by: PHYSICIAN ASSISTANT

## 2025-03-11 PROCEDURE — 3017F COLORECTAL CA SCREEN DOC REV: CPT | Performed by: PHYSICIAN ASSISTANT

## 2025-03-11 PROCEDURE — 1090F PRES/ABSN URINE INCON ASSESS: CPT | Performed by: PHYSICIAN ASSISTANT

## 2025-03-11 PROCEDURE — G8427 DOCREV CUR MEDS BY ELIG CLIN: HCPCS | Performed by: PHYSICIAN ASSISTANT

## 2025-03-11 PROCEDURE — 1036F TOBACCO NON-USER: CPT | Performed by: PHYSICIAN ASSISTANT

## 2025-03-11 NOTE — PROGRESS NOTES
of breath  EKG 12 lead      2. Chronic heart failure with preserved ejection fraction (HCC)  EKG 12 lead      3. PAF (paroxysmal atrial fibrillation) (HCC)  EKG 12 lead            Above findings and plan of care were discussed with patient in details, patient's questions were answered.     Disposition:      Advised patient to call office or seek immediate medical attention if there is any new onset of  any chest pain, sob, palpitations, lightheadedness, dizziness, orthopnea, PND or pedal edema.       Assessment & Plan  1. Paroxysmal atrial fibrillation/flutter.  Her blood pressure and pulse are within normal limits. The EKG revealed occasional PACs, but no atrial fibrillation was detected. She is currently on Eliquis 5 mg twice a day and aspirin 81 mg daily. Her metoprolol dosage was reduced to half a tablet twice a day due to fluctuations in her heart rate. She reports that this adjustment has made a difference, with her heart rate now typically in the 60s.    2. Ascending aortic and thoracic aortic aneurysm.  The echocardiogram from October 2024 showed mild MR and moderate in the tricuspid valve, with a normal ejection fraction of 55-60%. There is some dilation of the aorta at 4.1 cm. A CAT scan from January 2025 showed the ascending aorta at 4.4 cm and the descending aorta at 2.9 cm, with no interval change since 2023.    3. Orthostatic hypotension.  She had a tilt table test in 2023 due to episodes of losing balance, which indicated orthostatic hypotension.    4. Chronic obstructive pulmonary disease.  She has a history of occasional smoking in her youth and secondhand smoke exposure from her , who quit in 1985. The CAT scan showed some changes in her lungs, and she was referred to a pulmonologist. She is scheduled for another echocardiogram and a pulmonary function test.    5. Pulmonary hypertension.  There is a concern for pulmonary hypertension due to a severely dilated IVC and changes in her lungs

## 2025-03-18 ENCOUNTER — OFFICE VISIT (OUTPATIENT)
Dept: CARDIOLOGY CLINIC | Age: 75
End: 2025-03-18
Payer: MEDICARE

## 2025-03-18 VITALS
OXYGEN SATURATION: 98 % | HEART RATE: 62 BPM | HEIGHT: 69 IN | SYSTOLIC BLOOD PRESSURE: 122 MMHG | BODY MASS INDEX: 30.6 KG/M2 | WEIGHT: 206.6 LBS | DIASTOLIC BLOOD PRESSURE: 64 MMHG

## 2025-03-18 DIAGNOSIS — I48.0 PAF (PAROXYSMAL ATRIAL FIBRILLATION) (HCC): Primary | ICD-10-CM

## 2025-03-18 DIAGNOSIS — I49.1 PAC (PREMATURE ATRIAL CONTRACTION): ICD-10-CM

## 2025-03-18 DIAGNOSIS — E78.5 HYPERLIPIDEMIA, UNSPECIFIED HYPERLIPIDEMIA TYPE: ICD-10-CM

## 2025-03-18 DIAGNOSIS — I71.21 ANEURYSM OF ASCENDING AORTA WITHOUT RUPTURE: ICD-10-CM

## 2025-03-18 DIAGNOSIS — I50.32 CHRONIC HEART FAILURE WITH PRESERVED EJECTION FRACTION (HCC): ICD-10-CM

## 2025-03-18 PROCEDURE — 99214 OFFICE O/P EST MOD 30 MIN: CPT | Performed by: NURSE PRACTITIONER

## 2025-03-18 PROCEDURE — 3017F COLORECTAL CA SCREEN DOC REV: CPT | Performed by: NURSE PRACTITIONER

## 2025-03-18 PROCEDURE — 1159F MED LIST DOCD IN RCRD: CPT | Performed by: NURSE PRACTITIONER

## 2025-03-18 PROCEDURE — 1036F TOBACCO NON-USER: CPT | Performed by: NURSE PRACTITIONER

## 2025-03-18 PROCEDURE — G8399 PT W/DXA RESULTS DOCUMENT: HCPCS | Performed by: NURSE PRACTITIONER

## 2025-03-18 PROCEDURE — 1090F PRES/ABSN URINE INCON ASSESS: CPT | Performed by: NURSE PRACTITIONER

## 2025-03-18 PROCEDURE — G8427 DOCREV CUR MEDS BY ELIG CLIN: HCPCS | Performed by: NURSE PRACTITIONER

## 2025-03-18 PROCEDURE — G8417 CALC BMI ABV UP PARAM F/U: HCPCS | Performed by: NURSE PRACTITIONER

## 2025-03-18 PROCEDURE — 1123F ACP DISCUSS/DSCN MKR DOCD: CPT | Performed by: NURSE PRACTITIONER

## 2025-03-18 NOTE — PATIENT INSTRUCTIONS
Continue current medications as prescribed.  Have the echo, pulmonary function study and thyroid ultrasound as scheduled.   Will call with results.     Stay as active as you can.     Eat heart healthy diet.     Follow-up with your PCP as scheduled.    Follow-up with Andreia ORTA in 3 months as scheduled or sooner if need.

## 2025-03-18 NOTE — PROGRESS NOTES
Patient taking Lasix and Kcl PRN- not very often.     Taking Metoprolol differently- 12.5mg BID  
reports no issues with bowel or bladder function, typically having 1 to 3 bowel movements per day. She reports no symptoms of acid reflux or GERD. She has a history of fatty liver disease and umbilical hernia, which was repaired during a cholecystectomy. She has not had a potassium test since 10/2023, when her levels were found to be low. She uses compression hose daily and takes Lasix as needed, typically when her weight increases by more than 5 pounds.    She recently experienced chest pressure, which she initially attributed to a new, tighter bra. However, she noticed a relief in symptoms after taking Lasix. She reports no episodes of waking up gasping for air.    She has a history of atrial fibrillation/flutter and is currently on Eliquis. She reports no bleeding issues while on Eliquis.    She has a history of orthostatic hypotension with a positive tilt table test.    She has a history of lymphedema and uses lymphedema pumps. She occasionally experiences leg swelling, more pronounced in the summer, which she manages with a bike pedal and standing leg exercises.    She has a history of hyperlipidemia.    She has a history of chronic diastolic heart failure with a preserved ejection fraction of 55 to 60 percent per the echo of 10/2023. She has a new echo pending to be done tomorrow.    She has a history of COPD.    She has a history of pulmonary hypertension.    She has a history of thoracic aneurysm of 4.1 cm per CT, which is stable.    Supplemental Information  She had COVID-19 after her second vaccination and was in bed for 4 days. She tested positive for COVID-19 again about a year later. She feels her thinking has been off since then and it has gotten worse. She had influenza A in 02/2025 and did not want to eat half the time. She had a GI scope in the past and they saw a little red area, but when they did it again, they said it went away. She had her last colonoscopy last fall and does not need anymore.

## 2025-03-19 ENCOUNTER — HOSPITAL ENCOUNTER (OUTPATIENT)
Age: 75
Discharge: HOME | End: 2025-03-21
Attending: INTERNAL MEDICINE
Payer: MEDICARE

## 2025-03-19 ENCOUNTER — HOSPITAL ENCOUNTER (OUTPATIENT)
Age: 75
Discharge: HOME | End: 2025-03-19
Attending: INTERNAL MEDICINE
Payer: MEDICARE

## 2025-03-19 ENCOUNTER — HOSPITAL ENCOUNTER (OUTPATIENT)
Dept: ULTRASOUND IMAGING | Age: 75
Discharge: HOME | End: 2025-03-19
Attending: NURSE PRACTITIONER
Payer: MEDICARE

## 2025-03-19 ENCOUNTER — HOSPITAL ENCOUNTER (OUTPATIENT)
Dept: PULMONOLOGY | Age: 75
Discharge: HOME | End: 2025-03-19
Attending: INTERNAL MEDICINE
Payer: MEDICARE

## 2025-03-19 DIAGNOSIS — I27.20 PULMONARY HYPERTENSION (HCC): ICD-10-CM

## 2025-03-19 DIAGNOSIS — R06.02 SHORTNESS OF BREATH: ICD-10-CM

## 2025-03-19 DIAGNOSIS — R93.89 ABNORMAL IMAGING OF THYROID: ICD-10-CM

## 2025-03-19 DIAGNOSIS — I48.0 PAF (PAROXYSMAL ATRIAL FIBRILLATION) (HCC): ICD-10-CM

## 2025-03-19 DIAGNOSIS — R93.89 ABNORMAL CT OF THE CHEST: ICD-10-CM

## 2025-03-19 LAB
ANION GAP SERPL CALC-SCNC: 11 MEQ/L (ref 8–16)
BUN SERPL-MCNC: 19 MG/DL (ref 8–23)
CALCIUM SERPL-MCNC: 9.4 MG/DL (ref 8.8–10.2)
CHLORIDE SERPL-SCNC: 104 MEQ/L (ref 98–111)
CO2 SERPL-SCNC: 23 MEQ/L (ref 22–29)
CREAT SERPL-MCNC: 1.1 MG/DL (ref 0.5–0.9)
ECHO AO ASC DIAM: 4.2 CM
ECHO AV CUSP MM: 1.7 CM
ECHO AV MEAN GRADIENT: 5 MMHG
ECHO AV MEAN VELOCITY: 1.1 M/S
ECHO AV PEAK GRADIENT: 10 MMHG
ECHO AV PEAK VELOCITY: 1.6 M/S
ECHO AV VELOCITY RATIO: 0.5
ECHO AV VTI: 41.5 CM
ECHO EST RA PRESSURE: 8 MMHG
ECHO LA AREA 2C: 20.5 CM2
ECHO LA AREA 4C: 18.4 CM2
ECHO LA DIAMETER: 3.6 CM
ECHO LA MAJOR AXIS: 6.4 CM
ECHO LA MINOR AXIS: 5.3 CM
ECHO LA VOL BP: 56 ML (ref 22–52)
ECHO LA VOL MOD A2C: 66 ML (ref 22–52)
ECHO LA VOL MOD A4C: 40 ML (ref 22–52)
ECHO LV E' LATERAL VELOCITY: 7.1 CM/S
ECHO LV E' SEPTAL VELOCITY: 6.7 CM/S
ECHO LV EDV A2C: 84 ML
ECHO LV EDV A4C: 73 ML
ECHO LV EF PHYSICIAN: 55 %
ECHO LV EJECTION FRACTION A2C: 57 %
ECHO LV EJECTION FRACTION A4C: 56 %
ECHO LV EJECTION FRACTION BIPLANE: 55 % (ref 55–100)
ECHO LV ESV A2C: 37 ML
ECHO LV ESV A4C: 32 ML
ECHO LV FRACTIONAL SHORTENING: 29 % (ref 28–44)
ECHO LV INTERNAL DIMENSION DIASTOLIC: 4.5 CM (ref 3.9–5.3)
ECHO LV INTERNAL DIMENSION SYSTOLIC: 3.2 CM
ECHO LV ISOVOLUMETRIC RELAXATION TIME (IVRT): 56 MS
ECHO LV IVSD: 0.9 CM (ref 0.6–0.9)
ECHO LV MASS 2D: 132.8 G (ref 67–162)
ECHO LV POSTERIOR WALL DIASTOLIC: 0.9 CM (ref 0.6–0.9)
ECHO LV RELATIVE WALL THICKNESS RATIO: 0.4
ECHO LVOT AV VTI INDEX: 0.6
ECHO LVOT MEAN GRADIENT: 2 MMHG
ECHO LVOT PEAK GRADIENT: 3 MMHG
ECHO LVOT PEAK VELOCITY: 0.8 M/S
ECHO LVOT VTI: 24.8 CM
ECHO MV A VELOCITY: 0.95 M/S
ECHO MV E DECELERATION TIME (DT): 236 MS
ECHO MV E VELOCITY: 0.91 M/S
ECHO MV E/A RATIO: 0.96
ECHO MV E/E' LATERAL: 12.82
ECHO MV E/E' RATIO (AVERAGED): 13.2
ECHO MV E/E' SEPTAL: 13.58
ECHO MV REGURGITANT PEAK GRADIENT: 71 MMHG
ECHO MV REGURGITANT PEAK VELOCITY: 4.2 M/S
ECHO PULMONARY ARTERY END DIASTOLIC PRESSURE: 4 MMHG
ECHO PV MAX VELOCITY: 0.5 M/S
ECHO PV PEAK GRADIENT: 1 MMHG
ECHO PV REGURGITANT MAX VELOCITY: 1 M/S
ECHO RIGHT VENTRICULAR SYSTOLIC PRESSURE (RVSP): 39 MMHG
ECHO RV INTERNAL DIMENSION: 3.4 CM
ECHO RV TAPSE: 2.2 CM (ref 1.7–?)
ECHO TV E WAVE: 0.8 M/S
ECHO TV REGURGITANT MAX VELOCITY: 2.78 M/S
ECHO TV REGURGITANT PEAK GRADIENT: 31 MMHG
GFR SERPL CREATININE-BSD FRML MDRD: 52 ML/MIN/1.73M2
GLUCOSE SERPL-MCNC: 97 MG/DL (ref 74–109)
MAGNESIUM SERPL-MCNC: 2.1 MG/DL (ref 1.6–2.6)
POTASSIUM SERPL-SCNC: 4.1 MEQ/L (ref 3.5–5.2)
SODIUM SERPL-SCNC: 138 MEQ/L (ref 135–145)

## 2025-03-19 PROCEDURE — 80048 BASIC METABOLIC PNL TOTAL CA: CPT

## 2025-03-19 PROCEDURE — 93306 TTE W/DOPPLER COMPLETE: CPT

## 2025-03-19 PROCEDURE — 94726 PLETHYSMOGRAPHY LUNG VOLUMES: CPT

## 2025-03-19 PROCEDURE — 83735 ASSAY OF MAGNESIUM: CPT

## 2025-03-19 PROCEDURE — 94729 DIFFUSING CAPACITY: CPT

## 2025-03-19 PROCEDURE — 36415 COLL VENOUS BLD VENIPUNCTURE: CPT

## 2025-03-19 PROCEDURE — 76536 US EXAM OF HEAD AND NECK: CPT

## 2025-03-19 PROCEDURE — 94060 EVALUATION OF WHEEZING: CPT

## 2025-03-19 PROCEDURE — 93306 TTE W/DOPPLER COMPLETE: CPT | Performed by: INTERNAL MEDICINE

## 2025-03-31 ENCOUNTER — TRANSCRIBE ORDERS (OUTPATIENT)
Dept: ADMINISTRATIVE | Age: 75
End: 2025-03-31

## 2025-03-31 DIAGNOSIS — R10.11 RUQ ABDOMINAL PAIN: Primary | ICD-10-CM

## 2025-04-04 ENCOUNTER — HOSPITAL ENCOUNTER (OUTPATIENT)
Dept: CT IMAGING | Age: 75
Discharge: HOME | End: 2025-04-04
Payer: MEDICARE

## 2025-04-04 DIAGNOSIS — R10.11 RUQ ABDOMINAL PAIN: ICD-10-CM

## 2025-04-04 PROCEDURE — 6360000004 HC RX CONTRAST MEDICATION

## 2025-04-04 PROCEDURE — 74177 CT ABD & PELVIS W/CONTRAST: CPT

## 2025-04-04 RX ORDER — IOPAMIDOL 755 MG/ML
85 INJECTION, SOLUTION INTRAVASCULAR
Status: COMPLETED | OUTPATIENT
Start: 2025-04-04 | End: 2025-04-04

## 2025-04-04 RX ADMIN — IOPAMIDOL 85 ML: 755 INJECTION, SOLUTION INTRAVENOUS at 10:59

## 2025-04-28 ENCOUNTER — TELEPHONE (OUTPATIENT)
Dept: PULMONOLOGY | Age: 75
End: 2025-04-28

## 2025-05-06 ENCOUNTER — OFFICE VISIT (OUTPATIENT)
Dept: PULMONOLOGY | Age: 75
End: 2025-05-06
Payer: MEDICARE

## 2025-05-06 VITALS
HEART RATE: 69 BPM | DIASTOLIC BLOOD PRESSURE: 80 MMHG | WEIGHT: 209.8 LBS | HEIGHT: 69 IN | OXYGEN SATURATION: 96 % | BODY MASS INDEX: 31.07 KG/M2 | SYSTOLIC BLOOD PRESSURE: 134 MMHG | TEMPERATURE: 98 F

## 2025-05-06 DIAGNOSIS — I27.20 PULMONARY HYPERTENSION (HCC): ICD-10-CM

## 2025-05-06 DIAGNOSIS — R93.89 ABNORMAL CT OF THE CHEST: Primary | ICD-10-CM

## 2025-05-06 DIAGNOSIS — J44.9 CHRONIC OBSTRUCTIVE PULMONARY DISEASE, UNSPECIFIED COPD TYPE (HCC): ICD-10-CM

## 2025-05-06 PROCEDURE — 1123F ACP DISCUSS/DSCN MKR DOCD: CPT

## 2025-05-06 PROCEDURE — 3017F COLORECTAL CA SCREEN DOC REV: CPT

## 2025-05-06 PROCEDURE — 1090F PRES/ABSN URINE INCON ASSESS: CPT

## 2025-05-06 PROCEDURE — 1036F TOBACCO NON-USER: CPT

## 2025-05-06 PROCEDURE — 1160F RVW MEDS BY RX/DR IN RCRD: CPT

## 2025-05-06 PROCEDURE — 1159F MED LIST DOCD IN RCRD: CPT

## 2025-05-06 PROCEDURE — G8427 DOCREV CUR MEDS BY ELIG CLIN: HCPCS

## 2025-05-06 PROCEDURE — G8417 CALC BMI ABV UP PARAM F/U: HCPCS

## 2025-05-06 PROCEDURE — G8399 PT W/DXA RESULTS DOCUMENT: HCPCS

## 2025-05-06 PROCEDURE — 3023F SPIROM DOC REV: CPT

## 2025-05-06 PROCEDURE — 99214 OFFICE O/P EST MOD 30 MIN: CPT

## 2025-05-06 RX ORDER — AZELASTINE HYDROCHLORIDE, FLUTICASONE PROPIONATE 137; 50 UG/1; UG/1
SPRAY, METERED NASAL
COMMUNITY
Start: 2025-04-29

## 2025-05-06 RX ORDER — PREDNISONE 20 MG/1
TABLET ORAL
COMMUNITY
Start: 2025-04-28

## 2025-05-06 NOTE — PROGRESS NOTES
[facility]     Patient: HONG STANFORD, 75 y.o.   : 1950  Date of encounter: 2025   Previously seen by Dr. Rg     Subjective     Patient presents for 3 months SOB   follow up   Hong presents to the pulmonary clinic today with no acute concerns or complaints. States she recently followed with [facility], found to have deviated nasal septum, is being worked up to consider having sinus surgery. Pt reports she is not typically limited by Shortness of breath, denies wheezing, cough, chest tightness. Denies allergy symptoms. Denies CP, shortness of breath, chest tightness at this time.   Per chart review Hong is a 76 y/o female formal smoker who follows with the pulmonary clinic for cough, shortness of breath, and an abnormal CT chest.    Progress History:   - Since last visit any new medical issues? No  - New ER or hospital visits? No  - Any new or changes in medicines? No  - Using inhalers? No  - Are they helpful? No     Immunization History   Administered Date(s) Administered    COVID-19, MODERNA BLUE border, Primary or Immunocompromised, (age 12y+), IM, 100 mcg/0.5mL 2021, 2021, 2021, 2021    Pneumococcal, PCV-13, PREVNAR 13, (age 6w+), IM, 0.5mL 2020    Pneumococcal, PPSV23, PNEUMOVAX 23, (age 2y+), SC/IM, 0.5mL 10/20/2021    Polio Virus Vaccine 2022    Zoster Live (Zostavax) 2013       SOCIAL HISTORY:  Social History     Tobacco Use    Smoking status: Former     Current packs/day: 0.00     Types: Cigarettes     Quit date: 1978     Years since quittin.3    Smokeless tobacco: Never   Vaping Use    Vaping status: Never Used   Substance Use Topics    Alcohol use: Yes     Comment: rarely    Drug use: No       CURRENT MEDICATIONS:  Current Outpatient Medications   Medication Sig Dispense Refill    Azelastine-Fluticasone 137-50 MCG/ACT SUSP INSTILL 1 SPRAY IN EACH NOSTRIL TWICE DAILY      predniSONE (DELTASONE) 20 MG

## 2025-05-07 ASSESSMENT — ENCOUNTER SYMPTOMS
SORE THROAT: 0
COUGH: 0
RHINORRHEA: 0
WHEEZING: 0
SHORTNESS OF BREATH: 0
CHEST TIGHTNESS: 0

## 2025-06-17 ENCOUNTER — TELEPHONE (OUTPATIENT)
Dept: CARDIOLOGY CLINIC | Age: 75
End: 2025-06-17

## 2025-06-17 ENCOUNTER — OFFICE VISIT (OUTPATIENT)
Dept: CARDIOLOGY CLINIC | Age: 75
End: 2025-06-17
Payer: MEDICARE

## 2025-06-17 VITALS
BODY MASS INDEX: 30.07 KG/M2 | HEART RATE: 64 BPM | WEIGHT: 203 LBS | SYSTOLIC BLOOD PRESSURE: 132 MMHG | HEIGHT: 69 IN | DIASTOLIC BLOOD PRESSURE: 60 MMHG

## 2025-06-17 DIAGNOSIS — I71.21 ANEURYSM OF ASCENDING AORTA WITHOUT RUPTURE: ICD-10-CM

## 2025-06-17 DIAGNOSIS — I48.0 PAF (PAROXYSMAL ATRIAL FIBRILLATION) (HCC): Primary | ICD-10-CM

## 2025-06-17 DIAGNOSIS — I95.1 ORTHOSTATIC HYPOTENSION: ICD-10-CM

## 2025-06-17 PROCEDURE — 1036F TOBACCO NON-USER: CPT | Performed by: PHYSICIAN ASSISTANT

## 2025-06-17 PROCEDURE — 3017F COLORECTAL CA SCREEN DOC REV: CPT | Performed by: PHYSICIAN ASSISTANT

## 2025-06-17 PROCEDURE — 99214 OFFICE O/P EST MOD 30 MIN: CPT | Performed by: PHYSICIAN ASSISTANT

## 2025-06-17 PROCEDURE — G8399 PT W/DXA RESULTS DOCUMENT: HCPCS | Performed by: PHYSICIAN ASSISTANT

## 2025-06-17 PROCEDURE — G8417 CALC BMI ABV UP PARAM F/U: HCPCS | Performed by: PHYSICIAN ASSISTANT

## 2025-06-17 PROCEDURE — 1123F ACP DISCUSS/DSCN MKR DOCD: CPT | Performed by: PHYSICIAN ASSISTANT

## 2025-06-17 PROCEDURE — 1090F PRES/ABSN URINE INCON ASSESS: CPT | Performed by: PHYSICIAN ASSISTANT

## 2025-06-17 PROCEDURE — G8427 DOCREV CUR MEDS BY ELIG CLIN: HCPCS | Performed by: PHYSICIAN ASSISTANT

## 2025-06-17 PROCEDURE — 1159F MED LIST DOCD IN RCRD: CPT | Performed by: PHYSICIAN ASSISTANT

## 2025-06-17 NOTE — PROGRESS NOTES
[facility] [place] [facility]  [facility] CARDIOLOGY  [address]  [address]  [place] [address]  Dept: [phone]  Dept Fax: [phone]  Loc: [phone]    Chief Complaint   Patient presents with    Follow-up     4 month   No cardiac complaints      Office visit 3/11/2025  The patient is a 74-year-old female with a history of paroxysmal atrial fibrillation/flutter, ascending aortic and thoracic aortic aneurysm, who presents for follow-up.     She reports no significant shortness of breath but experiences mid-sternal achiness upon rising in the morning, which resolves with rest. This symptom does not occur during daytime activities or exercise. She maintains an active lifestyle, including walking and leg exercises. She has a history of occasional smoking in her youth and was exposed to secondhand smoke from her  until his cessation in 1985. She does not perceive her atrial fibrillation but occasionally experiences palpitations. Her metoprolol dosage was halved due to a heart rate fluctuation from 134 to 40, resulting in a typical heart rate range of 50s to 60s. She takes Lasix as needed for leg swelling, which is managed with compression hose and elevation. She has been informed of potential pulmonary hypertension and a severely dilated IVC. She was referred to a pulmonologist for COPD findings on a CT scan.        She has an upcoming echocardiogram scheduled for next week, as her pulmonologist identified an abnormality on a previous echocardiogram. She is also due for a pulmonary function test.     SOCIAL HISTORY  The patient smoked very occasionally when she was young for a couple of years. She reports no secondhand smoke exposure since her  quit smoking in 1985.     MEDICATIONS  Eliquis 5 mg twice a day, aspirin 81 mg daily, metoprolol (half a tablet twice a day), Lasix (as needed).        Cardiologist:  Dr. Bautista  History of Present Illness  The patient is a

## 2025-07-01 ENCOUNTER — HOSPITAL ENCOUNTER (OUTPATIENT)
Dept: CT IMAGING | Age: 75
Discharge: HOME | End: 2025-07-01
Payer: MEDICARE

## 2025-07-01 DIAGNOSIS — R93.89 ABNORMAL CT OF THE CHEST: ICD-10-CM

## 2025-07-01 LAB
APTT PPP: 35.5 SECONDS (ref 22–38)
CREAT SERPL-MCNC: 1.2 MG/DL (ref 0.5–0.9)
EKG ATRIAL RATE: 52 BPM
EKG P AXIS: 42 DEGREES
EKG P-R INTERVAL: 158 MS
EKG Q-T INTERVAL: 448 MS
EKG QRS DURATION: 76 MS
EKG QTC CALCULATION (BAZETT): 416 MS
EKG R AXIS: 1 DEGREES
EKG T AXIS: 22 DEGREES
EKG VENTRICULAR RATE: 52 BPM
GFR SERPL CREATININE-BSD FRML MDRD: 47 ML/MIN/1.73M2
INR PPP: 1.42 (ref 0.85–1.13)
PROTHROMBIN TIME: 16.3 SECONDS (ref 10–13.5)

## 2025-07-01 PROCEDURE — 82565 ASSAY OF CREATININE: CPT

## 2025-07-01 PROCEDURE — 85730 THROMBOPLASTIN TIME PARTIAL: CPT

## 2025-07-01 PROCEDURE — 71250 CT THORAX DX C-: CPT

## 2025-07-01 PROCEDURE — 93010 ELECTROCARDIOGRAM REPORT: CPT | Performed by: INTERNAL MEDICINE

## 2025-07-01 PROCEDURE — 93005 ELECTROCARDIOGRAM TRACING: CPT | Performed by: PHYSICIAN ASSISTANT

## 2025-07-01 PROCEDURE — 36415 COLL VENOUS BLD VENIPUNCTURE: CPT

## 2025-07-01 PROCEDURE — 85610 PROTHROMBIN TIME: CPT

## 2025-07-11 ENCOUNTER — HOSPITAL ENCOUNTER (OUTPATIENT)
Dept: MAMMOGRAPHY | Age: 75
Discharge: HOME | End: 2025-07-11
Payer: MEDICARE

## 2025-07-11 DIAGNOSIS — Z12.39 BREAST SCREENING: ICD-10-CM

## 2025-07-11 PROCEDURE — 77063 BREAST TOMOSYNTHESIS BI: CPT

## 2025-07-18 ENCOUNTER — OFFICE VISIT (OUTPATIENT)
Dept: PULMONOLOGY | Age: 75
End: 2025-07-18
Payer: MEDICARE

## 2025-07-18 VITALS
BODY MASS INDEX: 30.36 KG/M2 | HEIGHT: 69 IN | DIASTOLIC BLOOD PRESSURE: 80 MMHG | SYSTOLIC BLOOD PRESSURE: 128 MMHG | TEMPERATURE: 97.2 F | OXYGEN SATURATION: 98 % | WEIGHT: 205 LBS | HEART RATE: 52 BPM

## 2025-07-18 DIAGNOSIS — Z91.09 ENVIRONMENTAL ALLERGIES: ICD-10-CM

## 2025-07-18 DIAGNOSIS — R93.89 ABNORMAL CT OF THE CHEST: ICD-10-CM

## 2025-07-18 DIAGNOSIS — J44.9 STAGE 1 MILD COPD BY GOLD CLASSIFICATION (HCC): Primary | ICD-10-CM

## 2025-07-18 DIAGNOSIS — J43.2 CENTRILOBULAR EMPHYSEMA (HCC): ICD-10-CM

## 2025-07-18 DIAGNOSIS — I27.20 PULMONARY HYPERTENSION (HCC): ICD-10-CM

## 2025-07-18 PROCEDURE — 99213 OFFICE O/P EST LOW 20 MIN: CPT

## 2025-07-18 PROCEDURE — 3017F COLORECTAL CA SCREEN DOC REV: CPT

## 2025-07-18 PROCEDURE — G8399 PT W/DXA RESULTS DOCUMENT: HCPCS

## 2025-07-18 PROCEDURE — 1123F ACP DISCUSS/DSCN MKR DOCD: CPT

## 2025-07-18 PROCEDURE — 1036F TOBACCO NON-USER: CPT

## 2025-07-18 PROCEDURE — 1159F MED LIST DOCD IN RCRD: CPT

## 2025-07-18 PROCEDURE — 3023F SPIROM DOC REV: CPT

## 2025-07-18 PROCEDURE — 1160F RVW MEDS BY RX/DR IN RCRD: CPT

## 2025-07-18 PROCEDURE — G8417 CALC BMI ABV UP PARAM F/U: HCPCS

## 2025-07-18 PROCEDURE — G8427 DOCREV CUR MEDS BY ELIG CLIN: HCPCS

## 2025-07-18 PROCEDURE — 1090F PRES/ABSN URINE INCON ASSESS: CPT

## 2025-07-18 ASSESSMENT — ENCOUNTER SYMPTOMS
RHINORRHEA: 0
WHEEZING: 0
SORE THROAT: 0
SHORTNESS OF BREATH: 1
CHEST TIGHTNESS: 0
COUGH: 0

## 2025-07-18 NOTE — PROGRESS NOTES
[facility]     Patient: HONG STANFORD, 75 y.o.   : 1950  Date of encounter: 2025   Previously seen by Dr. Rg, Amie Garces PA-C     Subjective     Patient presents for 2 months chronic cough follow up   Hong presents to the pulmonary clinic today with no acute complaints or concerns. Reports she is planning ENT surgery in [place] August 15 for her deviated septum and turbinate blockage. Pt reports what shortness of breath she does have is mostly related to this. She reports no significant change in her symptoms since her last visit- denies wheezing, cough, chest pain or chest tightness. She states she has a known history of environmental allergies- to grass and dust in the home- but she loves mowing, and does not plan to stop at this time. Reports taking nasal spray by ENT with some mild relief of her chronic nasal congestion otherwise. Is not currently on inhaler therapy.   Per chart review Hong is a 76 y/o female formal social smoker who follows with the pulmonary clinic for cough, shortness of breath, and an abnormal CT chest. Has been tested for A1A in the past with MM genotype.     Progress History:   - Since last visit any new medical issues? Left foot pain- seeing a podiatrist  - New ER or hospital visits? No  - Any new or changes in medicines? No  - Using inhalers? No  - Are they helpful? No     Immunization History   Administered Date(s) Administered    COVID-19, MODERNA BLUE border, Primary or Immunocompromised, (age 12y+), IM, 100 mcg/0.5mL 2021, 2021, 2021, 2021    Pneumococcal, PCV-13, PREVNAR 13, (age 6w+), IM, 0.5mL 2020    Pneumococcal, PPSV23, PNEUMOVAX 23, (age 2y+), SC/IM, 0.5mL 10/20/2021    Polio Virus Vaccine 2022    Zoster Live (Zostavax) 2013       SOCIAL HISTORY:  Social History     Tobacco Use    Smoking status: Former     Current packs/day: 0.00     Types: Cigarettes     Quit date:

## 2025-07-30 ENCOUNTER — TELEPHONE (OUTPATIENT)
Dept: CARDIOLOGY CLINIC | Age: 75
End: 2025-07-30

## 2025-07-30 NOTE — TELEPHONE ENCOUNTER
Pt last seen by Javed 6-17-25.  Pt is needing clearance for Septoplasty with Dr. Duque.  Pt is on ASA and Eliquis.    Can pt be cleared?    Fax [phone]

## (undated) DEVICE — IV START KIT: Brand: MEDLINE INDUSTRIES, INC.

## (undated) DEVICE — HYDROTOME RX44 30MM X 260CM

## (undated) DEVICE — SOLUTION IV 1000ML 0.45% SOD CHL PH 5 INJ USP VIAFLX PLAS

## (undated) DEVICE — TROCAR: Brand: KII® SLEEVE

## (undated) DEVICE — CONTAINER,SPECIMEN,PNEU TUBE,4OZ,OR STRL: Brand: MEDLINE

## (undated) DEVICE — SUREFIT, DUAL DISPERSIVE ELECTRODE, CONTACT QUALITY MONITOR: Brand: SUREFIT

## (undated) DEVICE — TROCAR: Brand: KII SHIELDED BLADED ACCESS SYSTEM

## (undated) DEVICE — Device: Brand: JELCO

## (undated) DEVICE — CORE TRUMPET FOR SINGLE SOLUTION BAG: Brand: CORE DYNAMICS

## (undated) DEVICE — SOLUTION IV 1000ML LAC RINGERS PH 6.5 INJ USP VIAFLX PLAS

## (undated) DEVICE — DEFENDO AIR WATER SUCTION AND BIOPSY VALVE KIT FOR  OLYMPUS: Brand: DEFENDO AIR/WATER/SUCTION AND BIOPSY VALVE

## (undated) DEVICE — CONTRAST IOTHALAMATE MEGLUMINE 60% 50 ML INJ CONRAY 60

## (undated) DEVICE — CONMED SCOPE SAVER BITE BLOCK, 20X27 MM: Brand: SCOPE SAVER

## (undated) DEVICE — COVER ARMBRD W13XL28.5IN IMPERV BLU FOR OP RM

## (undated) DEVICE — MEDI-VAC YANKAUER SUCTION HANDLE W/BULBOUS TIP: Brand: CARDINAL HEALTH

## (undated) DEVICE — SET ADMIN 25ML L117IN PMP MOD CK VLV RLER CLMP 2 SMRTSITE

## (undated) DEVICE — PAD,EYE,1-5/8X2 5/8,STERILE,LF,1/PK: Brand: MEDLINE

## (undated) DEVICE — DEVICE LCK BILI RAP EXCHG W/O BX CAP

## (undated) DEVICE — 4-PORT MANIFOLD: Brand: NEPTUNE 2

## (undated) DEVICE — MEDI-VAC NON-CONDUCTIVE SUCTION TUBING 6MM X 6.1M (20 FT.) L: Brand: CARDINAL HEALTH

## (undated) DEVICE — BLADE LARYNSCP SZ 3 ENH DIR INTUB GLIDESCOPE MCGRATH MAC

## (undated) DEVICE — 3M™ TRANSPORE™ WHITE SURGICAL TAPE 1534-2, 2 INCH X 10 YARD (5CM X 9,1M), 6 ROLLS/CARTON 10 CARTONS/CASE: Brand: 3M™ TRANSPORE™

## (undated) DEVICE — GENERAL LAPAROSCOPY PACK-LF: Brand: MEDLINE INDUSTRIES, INC.

## (undated) DEVICE — RETRIEVAL BALLOON CATHETER: Brand: EXTRACTOR™ PRO RX

## (undated) DEVICE — SOLUTION IV 1000ML 0.9% SOD CHL PH 5 INJ USP VIAFLX PLAS

## (undated) DEVICE — ENDO KIT: Brand: MEDLINE INDUSTRIES, INC.

## (undated) DEVICE — AGENT HEMSTAT W4XL8IN OXIDIZED REGENERATED CELOS ABSRB

## (undated) DEVICE — EXCEL 10FT (3.05 M) INSUFFLATION TUBING SET WITH 0.1 MICRON FILTER: Brand: EXCEL

## (undated) DEVICE — GOWN,SIRUS,NON REINFRCD,LARGE,SET IN SL: Brand: MEDLINE

## (undated) DEVICE — SET ADMIN PRIMING 7ML L30IN 7.35LB 20 GTT 2ND RLER CLMP

## (undated) DEVICE — UNIVERSAL MONOPOLAR LAPAROSCOPIC CABLE 10FT, 4MM PIN CONNECTOR: Brand: CONMED

## (undated) DEVICE — KENDALL 500 SERIES DIAPHORETIC FOAM MONITORING ELECTRODE - TEAR DROP SHAPE ( 5/PK): Brand: KENDALL

## (undated) DEVICE — 2000CC GUARDIAN II: Brand: GUARDIAN

## (undated) DEVICE — SOLUTION IV IRRIG WATER 1000ML POUR BRL 2F7114

## (undated) DEVICE — GLOVE SURG SZ 65 THK91MIL LTX FREE SYN POLYISOPRENE

## (undated) DEVICE — INSUFFLATION NEEDLE TO ESTABLISH PNEUMOPERITONEUM.: Brand: INSUFFLATION NEEDLE

## (undated) DEVICE — FLEXIBLE ADHESIVE BANDAGE: Brand: CURITY

## (undated) DEVICE — APPLIER CLP M L L11.4IN DIA10MM ENDOSCP ROT MULT FOR LIG

## (undated) DEVICE — GLOVE ORANGE PI 7   MSG9070

## (undated) DEVICE — APPLICATOR ENDOSCP L34CM W/ S STL CANN PLAS OBT STYL FOR

## (undated) DEVICE — TUBING IV STOPCOCK 48 CM 3 W

## (undated) DEVICE — SNARE POLYP SM W13MMXL240CM SHTH DIA2.4MM OVL FLX DISP

## (undated) DEVICE — POSITIONER HD W8XH4XL8.5IN RASPBERRY FOAM SLT

## (undated) DEVICE — COAGULATOR ELECSURG BLADE 10 FTX1 IN PTFE STRL ULTRACLEAN